# Patient Record
Sex: FEMALE | Race: WHITE | NOT HISPANIC OR LATINO | Employment: FULL TIME | URBAN - METROPOLITAN AREA
[De-identification: names, ages, dates, MRNs, and addresses within clinical notes are randomized per-mention and may not be internally consistent; named-entity substitution may affect disease eponyms.]

---

## 2017-01-05 ENCOUNTER — ALLSCRIPTS OFFICE VISIT (OUTPATIENT)
Dept: OTHER | Facility: OTHER | Age: 47
End: 2017-01-05

## 2017-01-16 ENCOUNTER — ALLSCRIPTS OFFICE VISIT (OUTPATIENT)
Dept: OTHER | Facility: OTHER | Age: 47
End: 2017-01-16

## 2017-01-23 ENCOUNTER — ALLSCRIPTS OFFICE VISIT (OUTPATIENT)
Dept: OTHER | Facility: OTHER | Age: 47
End: 2017-01-23

## 2017-02-03 ENCOUNTER — ALLSCRIPTS OFFICE VISIT (OUTPATIENT)
Dept: OTHER | Facility: OTHER | Age: 47
End: 2017-02-03

## 2017-02-04 ENCOUNTER — HOSPITAL ENCOUNTER (EMERGENCY)
Facility: HOSPITAL | Age: 47
Discharge: HOME | End: 2017-02-04
Attending: EMERGENCY MEDICINE | Admitting: EMERGENCY MEDICINE
Payer: OTHER MISCELLANEOUS

## 2017-02-04 VITALS
DIASTOLIC BLOOD PRESSURE: 63 MMHG | OXYGEN SATURATION: 99 % | SYSTOLIC BLOOD PRESSURE: 109 MMHG | TEMPERATURE: 97.8 F | HEART RATE: 86 BPM | RESPIRATION RATE: 16 BRPM | WEIGHT: 200 LBS
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
## 2017-02-04 LAB — ALT SERPL W P-5'-P-CCNC: 19 U/L (ref 12–78)

## 2017-02-04 PROCEDURE — 87389 HIV-1 AG W/HIV-1&-2 AB AG IA: CPT | Performed by: EMERGENCY MEDICINE

## 2017-02-04 PROCEDURE — 87340 HEPATITIS B SURFACE AG IA: CPT | Performed by: EMERGENCY MEDICINE

## 2017-02-04 PROCEDURE — 99283 EMERGENCY DEPT VISIT LOW MDM: CPT

## 2017-02-04 PROCEDURE — 86706 HEP B SURFACE ANTIBODY: CPT | Performed by: EMERGENCY MEDICINE

## 2017-02-04 PROCEDURE — 84460 ALANINE AMINO (ALT) (SGPT): CPT | Performed by: EMERGENCY MEDICINE

## 2017-02-04 PROCEDURE — 36415 COLL VENOUS BLD VENIPUNCTURE: CPT | Performed by: EMERGENCY MEDICINE

## 2017-02-04 PROCEDURE — 86803 HEPATITIS C AB TEST: CPT | Performed by: EMERGENCY MEDICINE

## 2017-02-04 RX ORDER — MULTIVITAMIN
1 TABLET ORAL AS NEEDED
COMMUNITY

## 2017-02-04 RX ORDER — LORAZEPAM 0.5 MG/1
TABLET ORAL AS NEEDED
COMMUNITY
Start: 2015-09-10 | End: 2018-08-02

## 2017-02-05 LAB
HBV SURFACE AB SER-ACNC: 78.52 MIU/ML
HBV SURFACE AG SER QL: NORMAL
HCV AB SER QL: NORMAL

## 2017-02-06 LAB — HIV 1+2 AB+HIV1 P24 AG SERPL QL IA: NORMAL

## 2017-02-28 ENCOUNTER — ALLSCRIPTS OFFICE VISIT (OUTPATIENT)
Dept: OTHER | Facility: OTHER | Age: 47
End: 2017-02-28

## 2017-07-19 ENCOUNTER — APPOINTMENT (OUTPATIENT)
Dept: LAB | Facility: HOSPITAL | Age: 47
End: 2017-07-19
Payer: COMMERCIAL

## 2017-07-19 ENCOUNTER — TRANSCRIBE ORDERS (OUTPATIENT)
Dept: ADMINISTRATIVE | Facility: HOSPITAL | Age: 47
End: 2017-07-19

## 2017-07-19 DIAGNOSIS — Z00.8 HEALTH EXAMINATION IN POPULATION SURVEY: Primary | ICD-10-CM

## 2017-07-19 DIAGNOSIS — Z00.8 HEALTH EXAMINATION IN POPULATION SURVEY: ICD-10-CM

## 2017-07-19 LAB
CHOLEST SERPL-MCNC: 133 MG/DL (ref 50–200)
EST. AVERAGE GLUCOSE BLD GHB EST-MCNC: 114 MG/DL
HBA1C MFR BLD: 5.6 % (ref 4.2–6.3)
HDLC SERPL-MCNC: 52 MG/DL (ref 40–60)
LDLC SERPL CALC-MCNC: 63 MG/DL (ref 0–100)
TRIGL SERPL-MCNC: 91 MG/DL

## 2017-07-19 PROCEDURE — 83036 HEMOGLOBIN GLYCOSYLATED A1C: CPT

## 2017-07-19 PROCEDURE — 36415 COLL VENOUS BLD VENIPUNCTURE: CPT

## 2017-07-19 PROCEDURE — 80061 LIPID PANEL: CPT

## 2017-07-20 ENCOUNTER — TRANSCRIBE ORDERS (OUTPATIENT)
Dept: ADMINISTRATIVE | Facility: HOSPITAL | Age: 47
End: 2017-07-20

## 2017-07-20 DIAGNOSIS — Z12.31 ENCOUNTER FOR SCREENING MAMMOGRAM FOR MALIGNANT NEOPLASM OF BREAST: Primary | ICD-10-CM

## 2017-07-21 DIAGNOSIS — E66.8 OTHER OBESITY: ICD-10-CM

## 2017-07-21 DIAGNOSIS — F33.9 RECURRENT MAJOR DEPRESSIVE DISORDER (HCC): ICD-10-CM

## 2017-07-21 DIAGNOSIS — Z12.31 ENCOUNTER FOR SCREENING MAMMOGRAM FOR MALIGNANT NEOPLASM OF BREAST: ICD-10-CM

## 2017-07-21 DIAGNOSIS — I83.90 ASYMPTOMATIC VARICOSE VEINS OF LOWER EXTREMITY: ICD-10-CM

## 2017-07-21 DIAGNOSIS — L30.9 DERMATITIS: ICD-10-CM

## 2017-07-21 DIAGNOSIS — Z00.00 ENCOUNTER FOR GENERAL ADULT MEDICAL EXAMINATION WITHOUT ABNORMAL FINDINGS: ICD-10-CM

## 2017-07-21 DIAGNOSIS — R53.83 OTHER FATIGUE: ICD-10-CM

## 2017-07-26 ENCOUNTER — ALLSCRIPTS OFFICE VISIT (OUTPATIENT)
Dept: OTHER | Facility: OTHER | Age: 47
End: 2017-07-26

## 2017-07-28 ENCOUNTER — ALLSCRIPTS OFFICE VISIT (OUTPATIENT)
Dept: OTHER | Facility: OTHER | Age: 47
End: 2017-07-28

## 2017-07-28 ENCOUNTER — TRANSCRIBE ORDERS (OUTPATIENT)
Dept: LAB | Facility: CLINIC | Age: 47
End: 2017-07-28

## 2017-07-28 ENCOUNTER — APPOINTMENT (OUTPATIENT)
Dept: LAB | Facility: CLINIC | Age: 47
End: 2017-07-28
Payer: COMMERCIAL

## 2017-07-28 DIAGNOSIS — I83.90 ASYMPTOMATIC VARICOSE VEINS OF LOWER EXTREMITY: ICD-10-CM

## 2017-07-28 DIAGNOSIS — E66.8 OTHER OBESITY: ICD-10-CM

## 2017-07-28 DIAGNOSIS — Z00.00 ENCOUNTER FOR GENERAL ADULT MEDICAL EXAMINATION WITHOUT ABNORMAL FINDINGS: ICD-10-CM

## 2017-07-28 DIAGNOSIS — L30.9 DERMATITIS: ICD-10-CM

## 2017-07-28 DIAGNOSIS — R53.83 OTHER FATIGUE: ICD-10-CM

## 2017-07-28 DIAGNOSIS — F33.9 RECURRENT MAJOR DEPRESSIVE DISORDER (HCC): ICD-10-CM

## 2017-07-28 LAB
ALBUMIN SERPL BCP-MCNC: 3.7 G/DL (ref 3.5–5)
ALP SERPL-CCNC: 72 U/L (ref 46–116)
ALT SERPL W P-5'-P-CCNC: 16 U/L (ref 12–78)
ANION GAP SERPL CALCULATED.3IONS-SCNC: 9 MMOL/L (ref 4–13)
AST SERPL W P-5'-P-CCNC: 10 U/L (ref 5–45)
BASOPHILS # BLD AUTO: 0.01 THOUSANDS/ΜL (ref 0–0.1)
BASOPHILS NFR BLD AUTO: 0 % (ref 0–1)
BILIRUB SERPL-MCNC: 0.41 MG/DL (ref 0.2–1)
BUN SERPL-MCNC: 10 MG/DL (ref 5–25)
CALCIUM SERPL-MCNC: 8.9 MG/DL (ref 8.3–10.1)
CHLORIDE SERPL-SCNC: 103 MMOL/L (ref 100–108)
CO2 SERPL-SCNC: 26 MMOL/L (ref 21–32)
CREAT SERPL-MCNC: 0.73 MG/DL (ref 0.6–1.3)
EOSINOPHIL # BLD AUTO: 0.04 THOUSAND/ΜL (ref 0–0.61)
EOSINOPHIL NFR BLD AUTO: 1 % (ref 0–6)
ERYTHROCYTE [DISTWIDTH] IN BLOOD BY AUTOMATED COUNT: 12.9 % (ref 11.6–15.1)
GFR SERPL CREATININE-BSD FRML MDRD: 98 ML/MIN/1.73SQ M
GLUCOSE P FAST SERPL-MCNC: 84 MG/DL (ref 65–99)
HCT VFR BLD AUTO: 38.5 % (ref 34.8–46.1)
HGB BLD-MCNC: 13.3 G/DL (ref 11.5–15.4)
LYMPHOCYTES # BLD AUTO: 1.66 THOUSANDS/ΜL (ref 0.6–4.47)
LYMPHOCYTES NFR BLD AUTO: 28 % (ref 14–44)
MCH RBC QN AUTO: 29.7 PG (ref 26.8–34.3)
MCHC RBC AUTO-ENTMCNC: 34.5 G/DL (ref 31.4–37.4)
MCV RBC AUTO: 86 FL (ref 82–98)
MONOCYTES # BLD AUTO: 0.4 THOUSAND/ΜL (ref 0.17–1.22)
MONOCYTES NFR BLD AUTO: 7 % (ref 4–12)
NEUTROPHILS # BLD AUTO: 3.93 THOUSANDS/ΜL (ref 1.85–7.62)
NEUTS SEG NFR BLD AUTO: 64 % (ref 43–75)
NRBC BLD AUTO-RTO: 0 /100 WBCS
PLATELET # BLD AUTO: 208 THOUSANDS/UL (ref 149–390)
PMV BLD AUTO: 10.5 FL (ref 8.9–12.7)
POTASSIUM SERPL-SCNC: 4.2 MMOL/L (ref 3.5–5.3)
PROT SERPL-MCNC: 7.3 G/DL (ref 6.4–8.2)
RBC # BLD AUTO: 4.48 MILLION/UL (ref 3.81–5.12)
SODIUM SERPL-SCNC: 138 MMOL/L (ref 136–145)
TSH SERPL DL<=0.05 MIU/L-ACNC: 3.28 UIU/ML (ref 0.36–3.74)
WBC # BLD AUTO: 6.04 THOUSAND/UL (ref 4.31–10.16)

## 2017-07-28 PROCEDURE — 85025 COMPLETE CBC W/AUTO DIFF WBC: CPT

## 2017-07-28 PROCEDURE — 80053 COMPREHEN METABOLIC PANEL: CPT

## 2017-07-28 PROCEDURE — 36415 COLL VENOUS BLD VENIPUNCTURE: CPT

## 2017-07-28 PROCEDURE — 86617 LYME DISEASE ANTIBODY: CPT

## 2017-07-28 PROCEDURE — 84443 ASSAY THYROID STIM HORMONE: CPT

## 2017-07-29 LAB

## 2017-07-31 ENCOUNTER — GENERIC CONVERSION - ENCOUNTER (OUTPATIENT)
Dept: OTHER | Facility: OTHER | Age: 47
End: 2017-07-31

## 2017-08-16 ENCOUNTER — ALLSCRIPTS OFFICE VISIT (OUTPATIENT)
Dept: OTHER | Facility: OTHER | Age: 47
End: 2017-08-16

## 2017-08-16 ENCOUNTER — HOSPITAL ENCOUNTER (OUTPATIENT)
Dept: MAMMOGRAPHY | Facility: MEDICAL CENTER | Age: 47
Discharge: HOME | End: 2017-08-16
Payer: COMMERCIAL

## 2017-08-16 DIAGNOSIS — Z12.31 ENCOUNTER FOR SCREENING MAMMOGRAM FOR MALIGNANT NEOPLASM OF BREAST: ICD-10-CM

## 2017-08-16 PROCEDURE — 77063 BREAST TOMOSYNTHESIS BI: CPT

## 2017-08-16 PROCEDURE — G0202 SCR MAMMO BI INCL CAD: HCPCS

## 2017-10-09 ENCOUNTER — ALLSCRIPTS OFFICE VISIT (OUTPATIENT)
Dept: OTHER | Facility: OTHER | Age: 47
End: 2017-10-09

## 2017-11-07 ENCOUNTER — ALLSCRIPTS OFFICE VISIT (OUTPATIENT)
Dept: OTHER | Facility: OTHER | Age: 47
End: 2017-11-07

## 2018-01-10 NOTE — PROGRESS NOTES
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 to the point where she cannot realistically implement a set routine  This struggle was validated  When asked about tracking calories and measuring out her portion sizes she admitted that she does not implement those behaviors  She denies emotional and stress eating at this time  She states if she has cravings it is usually for sweets  She does not have desserts in her house currently and she reports if she does have candy at work she can limit herself to 1 or 2 pieces  She does not feel out of control with this behavior at this time  It appears that her biggest behavioral struggle is time management and planning ahead  Although, she does state that she tries to get 1 protein shake in each day, she appears to be struggling with getting the recommended 2 shakes per day  She reports getting enough water throughout the day and even though her bedtime changes based on her shift she continues to get about 7 hours a night consistently  She feels she is mindful of portion control, but knows she needs to get stricter with this  She states her goals are "baby steps"  She would like to focus on reducing her portion sizes  Counselor encouraged her to start becoming aware of possible emotional triggers causing her to crave sweets  These goals will be addressed during her next appointment  Active Problems    1  Adult BMI > 30 (V85 30) (E66 8)   2  Depression, major, recurrent (296 30) (F33 9)   3  Eczema (692 9) (L30 9)   4  Enlarged uterus (621 2) (N85 2)   5  History of allergy (V15 09) (Z88 9)   6  Irregular menses (626 4) (N92 6)   7  Spider vein, symptomatic (448 1) (I78 1)   8  Vertigo (780 4) (R42)   9   Weight gain (783  1) (R63 5)    Past Medical History    1  History of Acute upper respiratory infection (465 9) (J06 9)   2  History of Blood tests for routine general physical examination (V72 62) (Z00 00)   3  History of Encounter for gynecological examination with Papanicolaou smear of cervix   (V72 31,V76 2) (Z01 419,Z12 4)   4  History of Encounter for screening mammogram for malignant neoplasm of breast   (V76 12) (Z12 31)   5  History of acute sinusitis (V12 69) (Z87 09)   6  History of influenza (V12 09) (Z87 09)    Surgical History    1  History of Oral Surgery    Family History  Mother    1  Family history of Hypertension (V17 49)  Father    2  Family history of Cirrhosis   3  Family history of Leukemia (V16 6)  Maternal Grandmother    4  Family history of Diabetes Mellitus (V18 0)  Paternal Grandmother    11  Family history of Anxiety and depression  Paternal Grandfather    6  Family history of Acute Myocardial Infarction (V17 3)    Social History    · Being A Social Drinker   · College student   · Daily Coffee Consumption (3  Cups/Day)   · Employed   · Household: Mother   ·    · Never A Smoker    Current Meds   1  Belviq XR 20 MG Oral Tablet Extended Release 24 Hour; Take 1 tablet daily; Therapy: 37PUF8853 to (Evaluate:16Apr2017); Last Rx:16Jan2017 Ordered    Allergies    1  No Known Drug Allergies    Vitals  Signs   Recorded: 68HMR1433 01:21PM   Height: 5 ft 2 5 in  Weight: 200 lb 12 8 oz  BMI Calculated: 36 14  BSA Calculated: 1 93    Future Appointments    Date/Time Provider Specialty Site   02/15/2017 01:00 PM Terence Fisher [facility]   04/20/2017 10:00 AM [facility] Joseph   [facility]     Signatures   Electronically signed by : Kvng Kaplan, ; Feb  3 2017  1:49PM EST                       (Author)    Electronically signed by : CLEO Cole ; Feb 6 2017  9:10AM EST                       (Co-author)

## 2018-01-11 NOTE — RESULT NOTES
Verified Results  (1) THIN PREP PAP WITH IMAGING [id number] 12:00AM Estephania Bernal     Test Name Result Flag Reference   LAB AP CASE REPORT (Report)     Gynecologic Cytology Report            Case: [id number]                  Authorizing Provider: Gini Gil MD Collected:      03/14/2016           First Screen:     Claudean Gaunt, CT    Received:      03/16/2016 0944        Specimen:  LIQUID-BASED PAP, SCREENING, Endocervical   HPV HIGH RISK RESULT (Report)     HPV, High Risk: HPV NEG, HPV16 NEG, HPV18 NEG      Other High Risk HPV Negative, HPV 16 Negative, HPV 18 Negative  HPV types: 16,18,31,33,35,39,45,51,52,56,58,59,66 and 68 DNA are undetectable or below the pre-set threshold  [employer]'s FDA approved Tania 4800 is utilized with strict adherence to the 's instruction  manual to test for the presence of High-Risk HPV DNA, as well as HPV 16 and HPV 18  This instrument  has been validated by our laboratory and/or by the   A negative result does not preclude the presence of HPV infection because results depend on adequate  specimen collection, absence of inhibitors and sufficient DNA to be detected  Additionally, HPV negative  results are not intended to prevent women from proceeding to colposcopy if clinically warranted  Positive HPV test results indicate the presence of any one or more of the high risk types, but since patients  are often co-infected with low-risk types it does not rule out the presence of low-risk types in patients  with mixed infections  LAB AP GYN PRIMARY INTERPRETATION      Negative for intraepithelial lesion or malignancy   LAB AP GYN SPECIMEN ADEQUACY      Satisfactory for evaluation  Endocervical/transformation zone component present     LAB AP GYN ADDITIONAL INFORMATION (Report)     [email or web address]'s FDA approved ,  and ThinPrep Imaging System are   utilized with strict adherence to the 's instruction manual to   prepare gynecologic and non-gynecologic cytology specimens for the   production of ThinPrep slides as well as for gynecologic ThinPrep imaging  These processes have been validated by our laboratory and/or by the     The Pap test is not a diagnostic procedure and should not be used as the   sole means to detect cervical cancer  It is only a screening procedure to   aid in the detection of cervical cancer and its precursors  Both   false-negative and false-positive results have been experienced  Your   patient's test result should be interpreted in this context together with   the history and clinical findings

## 2018-01-11 NOTE — PROGRESS NOTES
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  Following meal plan and enjoying the products  Plans to continue with current regimen at this time  Bariatric MNT [facility]:   Weight Assessment: IBW: 110, ABW: 133 7 lbs, Weight Change: 5 1 lbs x 2 wks, Goal Weight: ,   Weight loss attempts: Weight Watchers: 20 lbs lbs  Excess calories come from in between meal snacking, large portions at mealtimes and high calorie high fat food choices  Dietary Recall:   Breakfast: shake  Snack: 150 calorie  Lunch: shake (start of shift)  Snack: midnight: chicken/salad/vinagrette/veggies  Dinner: 150 calories  Snack: 150 calories   Beverages: water  Estimated fluid intake: >64  Exercise Frequency:  She does not exercise  Her obesity/being overweight is related to excess energy intake and as evidenced by BMI=37 5  Nutrition Prescription: Estimated calories for weight loss: eCal BMR 1523, wt loss 828-1328, Estimated daily protein needs: 60-75 gm (1 2-1 5 gm/kg IBW) and Estimated daily fluid needs: 58 0z (35 cc/kg IBW)  Nutrition Intervention: Counseling provided with emphasis on meal planning, portion sizes, healthy snack choices and food journaling  Education materials provided: [employer] manual    Comprehension: good  Expected Compliance: good  Goals: follow meal plan prescribed, reduce portion sizes at mealtimes, plan meals and snacks daily, food journal and do not skip meals or snacks  Monitor/Evaluation: weekly weight, food journal, fluid intake and exercise level  Active Problems    1  Acute sinusitis (461 9) (J01 90)   2  Acute upper respiratory infection (465 9) (J06 9)   3  Adult BMI > 30 (V85 30) (E66 8)   4   Blood tests for routine general physical examination (V72 62) (Z00 00)   5  Depression, major, recurrent (296 30) (F33 9)   6  Eczema (692 9) (L30 9)   7  Encounter for gynecological examination with Papanicolaou smear of cervix   (V72 31,V76 2) (Z01 419,Z12 4)   8  Encounter for screening mammogram for malignant neoplasm of breast (V76 12)   (Z12 31)   9  Enlarged uterus (621 2) (N85 2)   10  History of allergy (V15 09) (Z88 9)   11  Influenza (487 1) (J11 1)   12  Irregular menses (626 4) (N92 6)   13  Spider vein, symptomatic (448 1) (I78 1)   14  Vertigo (780 4) (R42)   15  Weight gain (783 1) (R63 5)    Surgical History    1  History of Oral Surgery    Family History  Mother    1  Family history of Hypertension (V17 49)  Father    2  Family history of Cirrhosis   3  Family history of Leukemia (V16 6)  Maternal Grandmother    4  Family history of Diabetes Mellitus (V18 0)  Paternal Grandmother    11  Family history of Anxiety and depression  Paternal Grandfather    6  Family history of Acute Myocardial Infarction (V17 3)    Social History    · Being A Social Drinker   · College student   · Daily Coffee Consumption (3  Cups/Day)   · Employed   · Household: Mother   ·    · Never A Smoker    Current Meds   1  Multivitamins TABS; Therapy: (Recorded:14Mar2016) to Recorded   2  Tylenol TABS; Therapy: (Recorded:14Mar2016) to Recorded    Allergies    1  No Known Drug Allergies    Vitals  Signs   Recorded: 31Yze9485 04:46PM   Height: 5 ft 2 in  Weight: 204 lb 14 oz  BMI Calculated: 37 47  BSA Calculated: 1 93    Future Appointments    Date/Time Provider Specialty Site   10/10/2016 02:45 PM CLEO Babb  Bariatric Medicine [facility]     Signatures   Electronically signed by :  Ashley Rutledge, ; Aug  1 2016  4:44PM EST                       (Author)    Electronically signed by : CLEO Cruz ; Aug  2 2016  8:21AM EST                       (Co-author)

## 2018-01-12 NOTE — CONSULTS
Chief Complaint  Chief Complaint Free Text Note Form: New Pt, here for MWM consult  Stop Band total 1/8  Pt, stated no complaints  History of Present Illness  Free Text HPI: Obesity-  Severity: Moderate  Onset: Most of her life  Modifiers: Is a weight watchers member for life, tried the whole 30 diet and lost weight  Gained weight through nursing school and following back to old habits  Associated Symptoms: Feels uncomfortable  Past Medical History  Active Problems And Past Medical History Reviewed: The active problems and past medical history were reviewed and updated today  Assessment    1  Weight gain (783 1) (R63 5)   2  Adult BMI > 30 (V85 30) (E66 8)    Discussion/Summary  Discussion Summary:   54 yo female w/ obesity here to pursue medical weight management to improve weight and health  Obesity class 2:  -discussed options of conservative vs MADISYN program +/- meal replacement  -initial focus of 5-10% weight loss over 3-6 mos for improved health  -screening labs-recently completed and within acceptable range    RTC for new start visit and 12 week follow up with me     Review of Systems  Focused-Female:   Constitutional: no fever  ENT: no sore throat  Cardiovascular: no chest pain  Respiratory: no shortness of breath  Gastrointestinal: no abdominal pain  Genitourinary: no dysuria  Musculoskeletal: arthralgias  Integumentary: rash  Neurological: headache  Other Symptoms: Psych: Denies depression/anxiety  Active Problems    1  Acute sinusitis (461 9) (J01 90)   2  Acute upper respiratory infection (465 9) (J06 9)   3  Blood tests for routine general physical examination (V72 62) (Z00 00)   4  Depression, major, recurrent (296 30) (F33 9)   5  Eczema (692 9) (L30 9)   6  Encounter for gynecological examination with Papanicolaou smear of cervix   (V72 31,V76 2) (Z01 419,Z12 4)   7  Encounter for screening mammogram for malignant neoplasm of breast (V76 12)   (Z12 31)   8  Enlarged uterus (621 2) (N85 2)   9  History of allergy (V15 09) (Z88 9)   10  Influenza (487 1) (J11 1)   11  Irregular menses (626 4) (N92 6)   12  Spider vein, symptomatic (448 1) (I78 1)   13  Vertigo (780 4) (R42)    Surgical History    1  History of Oral Surgery  Surgical History Reviewed: The surgical history was reviewed and updated today  Family History  Mother    1  Family history of Hypertension (V17 49)  Father    2  Family history of Cirrhosis   3  Family history of Leukemia (V16 6)  Maternal Grandmother    4  Family history of Diabetes Mellitus (V18 0)  Paternal Grandmother    11  Family history of Anxiety and depression  Paternal Grandfather    6  Family history of Acute Myocardial Infarction (V17 3)  Family History Reviewed: The family history was reviewed and updated today  Social History    · Being A Social Drinker   · College student   · Daily Coffee Consumption (3  Cups/Day)   · Employed   · Household: Mother   ·    · Never A Smoker  Social History Reviewed: The social history was reviewed and updated today  Current Meds   1  Multivitamins TABS; Therapy: (Recorded:14Mar2016) to Recorded   2  Tylenol TABS; Therapy: (Recorded:14Mar2016) to Recorded  Medication List Reviewed: The medication list was reviewed and updated today  Allergies    1  No Known Drug Allergies    Vitals  Vital Signs [Data Includes: Current Encounter]    Recorded: 77LET1061 08:20AM   Temperature 98 4 F    Heart Rate 84    Respiration 16    Systolic 243    Diastolic 74    Height 5 ft 2 in    Weight 205 lb 0 96 oz    BMI Calculated 37 51    BSA Calculated 1 93    O2 Saturation 97    Waist Circumference  45   Neck Circumference  14 5     Physical Exam    Constitutional   General appearance: No acute distress, well appearing and well nourished  well developed and obese  Eyes No conjunctival pallor  Ears, Nose, Mouth, and Throat Moist oral mucosa     Pulmonary   Respiratory effort: No increased work of breathing or signs of respiratory distress  Auscultation of lungs: Clear to auscultation  Cardiovascular   Auscultation of heart: Normal rate and rhythm, normal S1 and S2, without murmurs  Trace edema right greater than left  Abdomen   Abdomen: Non-tender, no masses  The abdomen was obese  The abdomen was soft and nontender     Musculoskeletal   Gait and station: Normal     Psychiatric   Orientation to person, place, and time: Normal     Mood and affect: Normal          Results/Data  Encounter Results   STOP BANG Questionnaire 27MHA8401 08:49AM User, Ahs     Test Name Result Flag Reference   STOP BANG Questionnaire Risk of JIGAR Low Risk     Snoring: No  Tired: Yes  Observed: No  Blood Pressure: No  BMI: No  Age: No  Neck Circumference: No  Gender: No   STOP BANG Questionnaire JIGAR Total Score 1     Snoring: No  Tired: Yes  Observed: No  Blood Pressure: No  BMI: No  Age: No  Neck Circumference: No  Gender: No       Future Appointments    Date/Time Provider Specialty Site   07/18/2016 01:00 PM Jerry Fisher 81     Signatures   Electronically signed by : CLEO Cole ; Jun 29 2016  9:28AM EST                       (Author)    Electronically signed by : CLEO Cole ; Jun 29 2016 12:22PM EST                       (Author)

## 2018-01-12 NOTE — RESULT NOTES
Verified Results  * [facility] [id number] 03:45PM Howard Rodriguez Order Number: [id number]     Test Name Result Flag Reference   US PELVIS COMPLETE (TRANSABDOMINAL AND TRANSVAGINAL) (Report)     PELVIC ULTRASOUND, COMPLETE     INDICATION: Evaluate for enlarged uterus  COMPARISON: None  TECHNIQUE:  Transabdominal pelvic ultrasound was performed in sagittal and transverse planes with a curvilinear transducer  Additional transvaginal imaging was performed to better evaluate the endometrium and ovaries  Imaging included volumetric    sweeps as well as traditional still imaging technique  FINDINGS:     UTERUS:   The uterus is anteverted in position, measuring 10 8 x 4 7 x 6 8 cm  Contour and echotexture appear normal      The cervix shows no suspicious abnormality  ENDOMETRIUM:    Normal caliber of 10 mm  Homogenous and normal in appearance  OVARIES/ADNEXA:   Right ovary: 3 4 x 1 6 x 1 6 cm  No suspicious right ovarian abnormality  Doppler flow within normal limits  Left ovary: 2 2 x 2 8 x 2 5 cm  No suspicious left ovarian abnormality  Doppler flow within normal limits  No suspicious adnexal mass or loculated collections  There is no free fluid  IMPRESSION:      Normal pelvic ultrasound            Workstation performed: [id number]     Signed by:   Shanon Sanabria MD   4/18/16

## 2018-01-13 VITALS
HEIGHT: 62 IN | BODY MASS INDEX: 38.09 KG/M2 | DIASTOLIC BLOOD PRESSURE: 80 MMHG | HEART RATE: 60 BPM | WEIGHT: 207 LBS | RESPIRATION RATE: 16 BRPM | SYSTOLIC BLOOD PRESSURE: 106 MMHG

## 2018-01-13 VITALS
DIASTOLIC BLOOD PRESSURE: 82 MMHG | SYSTOLIC BLOOD PRESSURE: 112 MMHG | HEIGHT: 62 IN | BODY MASS INDEX: 37.73 KG/M2 | WEIGHT: 205 LBS

## 2018-01-13 NOTE — MISCELLANEOUS
Provider Comments  Provider Comments:   Dear Rory Matamoros,     We called you about your scheduled appointment for today but were unable to reach you  It is very important that you follow up with us so that we can assess your physical and nutritional safety  Please call our office at [phone] to reschedule your appointment       Sincerely,     [facility]        Signatures   Electronically signed by : Marianna Espinosa, ; Mar 23 2016  1:54PM EST                       (Author)    Electronically signed by : CLEO Dsyon ; Mar 23 2016  2:04PM EST                       (Co-author)

## 2018-01-13 NOTE — PROGRESS NOTES
Assessment    1  Encounter for preventive health examination (V70 0) (Z00 00)   2  Severe obesity (BMI 35 0-35 9 with comorbidity) (278 01,V85 35) (E66 01,Z68 35)   3  Depression, major, recurrent (296 30) (F33 9)   4  Varicose veins (454 9) (I83 90)   5  Need for tetanus booster (V03 7) (Z23)    Plan  Need for tetanus booster    · Adacel 5-2-15 5 LF-MCG/0 5 Intramuscular Suspension    Discussion/Summary  health maintenance visit Currently, she eats an adequate diet, has an inadequate exercise regimen and encouraged to increase her exercise to 150 minutes a week  cervical cancer screening is current Breast cancer screening: mammogram is current  Colorectal cancer screening: colorectal cancer screening is not indicated  Osteoporosis screening: bone mineral density testing is not indicated  Screening lab work includes labs results reviewed  The immunizations will be given as outlined in the orders  She was advised to be evaluated by will call if decides that she wants to see audiologist  Advice and education were given regarding nutrition, aerobic exercise and weight loss  Patient discussion: discussed with the patient  1  Obesity - the patient will continue on Contrave 1 tablet daily for now  She will be following up with Dr Helena Conde at the end of the month  She was encouraged to continue to follow her 1200 calorie diet and to work on increasing her exercise  2  Depression -the patient is doing well with her current bupropion that she is getting through her Contrave prescription  We will monitor her symptoms for now  She was encouraged to call for follow-up and discussion of medication if her weight loss medication is changed at some point in the future and she feels like she needs something else for depression  3  Varicose vein -the patient still has trace edema noted in her legs  She was encouraged to limit her salt intake and to get her prescription for the compression stocking filled     Possible side effects of new medications were reviewed with the patient/guardian today  The treatment plan was reviewed with the patient/guardian  The patient/guardian understands and agrees with the treatment plan      Chief Complaint  Physical      History of Present Illness  HM, Adult Female: The patient is being seen for a health maintenance evaluation  General Health: The patient's health since the last visit is described as fair   wants to lose weight  She has regular dental visits  She complains of vision problems  Vision care includes wearing glasses, wearing soft contact lenses and an eye examination within the last year  She denies hearing loss  Immunizations status: not up to date The patient needs the following immunization(s): tetanus vaccine  Lifestyle:  She consumes a diverse and healthy diet  She does not exercise regularly  She does not use tobacco  She consumes alcohol  (1 x a month)   She denies drug use  Reproductive health:  she reports abnormal menses  Menstrual history: The cycles are irregular  The duration of her recent periods usually last 5-7 days  Screening: Cervical cancer screening includes a pap smear performed 3/2016  Breast cancer screening includes a mammogram performed 8/2017  Colorectal cancer screening includes no previous colonoscopy  Metabolic screening includes lipid profile performed 7/2017, glucose screening performed 7/2017 and thyroid function test performed 7/2017  Safety elements used: seat belt, sunscreen and smoke detector, but no carbon monoxide detector  Risk findings: no domestic violence     HPI: notes that she is doing better with her mood since on Contrave - was having trouble with clenching teeth a lot all day - better since down to 1 tablet daily (was up to 3-4 tabs) - had mild nausea which comes and goes still - has been sticking to 1200 calories a day - has gone to the gym about     notes that 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 - no compression stockings yet    notes still fatigued at times - just depends on if her menses is coming and then if her sleep is adequate    She notes that her hand dermatitis has resolved since her last visit  She used triamcinolone cream for about a week and has been good since then  Review of Systems    Constitutional: no fever and no chills  Cardiovascular: no chest pain and no palpitations  Respiratory: no shortness of breath, no cough and no wheezing  Gastrointestinal: nausea, but no vomiting and no diarrhea  Genitourinary: no dysuria  The patient presents with complaints of arthralgias (hips and back)  Integumentary: no rashes  Neurological: no headache, no dizziness and no fainting  Psychiatric: depression, but as noted in HPI  Hematologic/Lymphatic: no tendency for easy bleeding and no tendency for easy bruising  Active Problems    1  Adult BMI > 30 (V85 30)   2  Depression, major, recurrent (296 30) (F33 9)   3  Eczema (692 9) (L30 9)   4  Encounter for gynecological examination (V72 31) (Z01 419)   5  Encounter for screening mammogram for malignant neoplasm of breast (V76 12)   (Z12 31)   6  Enlarged uterus (621 2) (N85 2)   7  Fatigue (780 79) (R53 83)   8  Hand dermatitis (692 9) (L30 9)   9  History of allergy (V15 09) (Z88 9)   10  Irregular menses (626 4) (N92 6)   11  Severe obesity (BMI 35 0-35 9 with comorbidity) (278 01,V85 35) (E66 01,Z68 35)   12  Spider vein, symptomatic (448 1) (I78 1)   13  Varicose veins (454 9) (I83 90)   14  Vertigo (780 4) (R42)   15   Weight gain (783 1) (R63 5)    Past Medical History    · History of Acute upper respiratory infection (465 9) (J06 9)   · History of Blood tests for routine general physical examination (V72 62) (Z00 00)   · History of Encounter for gynecological examination with Papanicolaou smear of cervix  (V72 31) (Z01 419)   · History of acute sinusitis (V12 69) (Z87 09)   · History of influenza (V12 09) (Z87 09)   · History of Yeast infection (112 9) (B37 9)    Surgical History    · History of Oral Surgery   · History of Root Canal With Apicoectomy    Family History  Mother    · Family history of hypertension (V17 49) (Z82 49)   · Family history of rheumatoid arthritis (V17 7) (Z82 61)  Father    · Family history of Cirrhosis   · Family history of leukemia (V16 6) (Z80 6)  Maternal Grandmother    · Family history of diabetes mellitus (V18 0) (Z83 3)   · Family history of hypertension (V17 49) (Z82 49)   · Family history of pulmonary hypertension (V17 49) (Z82 49)  Paternal Grandmother    · Family history of Anxiety and depression   · Family history of dementia (V17 2) (Z81 8)   · Family history of glaucoma (V19 11) (Z83 511)   · Family history of hypertension (V17 49) (Z82 49)  Maternal Grandfather    · Family history of malignant neoplasm (V16 9) (Z80 9)  Paternal Grandfather    · Family history of lung cancer (V16 1) (Z80 1)   · Family history of myocardial infarction (V17 3) (Z80 55)    Social History    · Being A Social Drinker   · College student   · Daily Coffee Consumption (2  Cups/Day)   · Employed   · Household: Mother   ·    · Never a smoker    Current Meds   1  Contrave 8-90 MG Oral Tablet Extended Release 12 Hour; TAKE TWO TABLETS TWICE   A DAY, AS DIRECTED; Therapy: 68TCS1731 to (Last Rx:[id number])  Requested for: 66Pcn2495 Ordered   2  Multi-Vitamin TABS; Therapy: (Recorded:95Nqh3740) to Recorded    Allergies    1  No Known Drug Allergies    Vitals   Recorded: 32QIJ1137 08:24AM   Heart Rate 60   Respiration 16   Systolic 105   Diastolic 80   Height 5 ft 2 in   Weight 207 lb    BMI Calculated 37 86   BSA Calculated 1 94     Physical Exam    Constitutional   General appearance: No acute distress, well appearing and well nourished  obese  Head and Face   Head and face: Normal     Eyes   Conjunctiva and lids: No swelling, erythema or discharge  Pupils and irises: Equal, round, reactive to light      Ears, Nose, Mouth, and Throat   External inspection of ears and nose: Normal     Otoscopic examination: Tympanic membranes translucent with normal light reflex  Canals patent without erythema  Hearing: Normal     Nasal mucosa, septum, and turbinates: Normal without edema or erythema  Lips, teeth, and gums: Normal, good dentition  Oropharynx: Normal with no erythema, edema, exudate or lesions  Neck   Neck: Supple, symmetric, trachea midline, no masses  Thyroid: Normal, no thyromegaly  Pulmonary   Respiratory effort: No increased work of breathing or signs of respiratory distress  Auscultation of lungs: Clear to auscultation  Cardiovascular   Auscultation of heart: Normal rate and rhythm, normal S1 and S2, no murmurs  Peripheral vascular exam: Normal   Carotid: no bruit on the right and no bruit on the left  Radial: right 2+ and left 2+  Posterior tibialis: right 2+ and left 2+  Examination of extremities for edema and/or varicosities: Abnormal   bilateral pretibial Trace+ pitting edema  Abdomen   Abdomen: Non-tender, no masses  Liver and spleen: No hepatomegaly or splenomegaly  Lymphatic   Palpation of lymph nodes in neck: No lymphadenopathy  Musculoskeletal   Gait and station: Normal     Muscle strength/tone: Normal   Motor Strength Findings: normal upper extremity strength and normal lower extremity strength  Skin   Skin and subcutaneous tissue: Normal without rashes or lesions  Neurologic   Sensation: No sensory loss  Sensory exam: intact to light touch  Psychiatric   Mood and affect: Normal        Results/Data  PHQ-9 Adult Depression Screening [id number] 09:17AM User, [employer]     Test Name Result Flag Reference   PHQ-9 Adult Depression Score 1     Over the last two weeks, how often have you been bothered by any of the following problems?   Little interest or pleasure in doing things: Not at all - 0  Feeling down, depressed, or hopeless: Not at all - 0  Trouble falling or staying asleep, or sleeping too much: Not at all - 0  Feeling tired or having little energy: Several days - 1  Poor appetite or over eating: Not at all - 0  Feeling bad about yourself - or that you are a failure or have let yourself or your family down: Not at all - 0  Trouble concentrating on things, such as reading the newspaper or watching television: Not at all - 0  Moving or speaking so slowly that other people could have noticed  Or the opposite -  being so fidgety or restless that you have been moving around a lot more than usual: Not at all - 0  Thoughts that you would be better off dead, or of hurting yourself in some way: Not at all - 0   PHQ-9 Adult Depression Screening Negative     PHQ-9 Difficulty Level Not difficult at all     PHQ-9 Severity Minimal Depression         Future Appointments    Date/Time Provider Specialty Site   08/20/2018 04:30 PM CLEO Cummings  Obstetrics/Gynecology [facility]   10/27/2017 02:30 PM CLEO Boone   Bariatric Medicine [facility]     Signatures   Electronically signed by : Nury Frank, [facility]; Oct  9 2017 10:00AM EST                       (Author)    Electronically signed by : CLEO Tena ; Oct  9 2017  8:57PM EST

## 2018-01-13 NOTE — PROGRESS NOTES
Assessment    1  Blood tests for routine general physical examination (V72 62) (Z00 00)   2  Encounter for preventive health examination (V70 0) (Z00 00)    Plan  Blood tests for routine general physical examination, Health Maintenance    · (1) CBC/PLT/DIFF; Status:Hold For - Exact Date; Requested for: In Office Collection;    · (1) COMPREHENSIVE METABOLIC PANEL; Status:Hold For - Exact Date; Requested  for: In Office Collection;    · (1) HEMOGLOBIN A1C; Status:Hold For - Exact Date; Requested for: In Office Collection;    · (1) LIPID PANEL, FASTING; Status:Hold For - Exact Date; Requested for: In Office  Collection;    · (1) TSH WITH FT4 REFLEX; Status:Hold For - Exact Date; Requested for: In Office  Collection;    · (1) URINALYSIS w URINE C/S REFLEX (will reflex a microscopy if leukocytes, occult  blood, or nitrites are not within normal limits); Status:Hold For - Exact Date; Requested  for: In Office Collection;    · (1) VITAMIN D 25-HYDROXY; Status:Hold For - Exact Date; Requested for: In [employer]; Discussion/Summary  health maintenance visit Currently, she eats an adequate diet and has an inadequate exercise regimen  the risks and benefits of cervical cancer screening were discussed cervical cancer screening is current Breast cancer screening: the risks and benefits of breast cancer screening were discussed, monthly self breast exam was advised and mammogram is current  Colorectal cancer screening: the risks and benefits of colorectal cancer screening were discussed and colorectal cancer screening is not indicated  Osteoporosis screening: the risks and benefits of osteoporosis screening were discussed  Screening lab work includes hemoglobin, glucose, lipid profile, thyroid function testing, 25-hydroxyvitamin D and urinalysis  The risks and benefits of immunizations were discussed   Advice and education were given regarding nutrition, aerobic exercise, weight bearing exercise, weight loss, calcium supplements, vitamin D supplements, sunscreen use and self skin examination  Patient to return to the office for fasting labs to include CBC, CMP, lipid profile, hemoglobin A1c, TSH with reflex free T4, vitamin D and UA  Discussed low fat and low carbohydrate diet and regular exercise walking 4-5 times a week for 30 minutes  Weight loss encouraged  Patient to return to the office when necessary  Chief Complaint    1  Cold Symptoms  Yearly Wellness Visit      History of Present Illness  HM, Adult Female: The patient is being seen for a health maintenance evaluation  The last health maintenance visit was 2 year(s) ago  General Health: The patient's health since the last visit is described as good  She has regular dental visits  She denies vision problems  Vision care includes wearing glasses  She denies hearing loss  Lifestyle:  She does not have a healthy diet  She has weight concerns  She does not exercise regularly  She does not use tobacco  She consumes alcohol  She reports occasional alcohol use  She denies drug use  Reproductive health: the patient is perimenopausal   she reports normal menses  Menstrual history: The cycles are irregular  Screening: cancer screening reviewed and current  metabolic screening reviewed and updated  HPI: 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 [email or web address] who is here for yearly wellness exam  Patient is feeling well overall and is currently getting over a sinus infection  No regular exercise program  Patient is up-to-date on GYN exam and mammogram  Patient has a routine appointment scheduled with her dermatologist       Active Problems    1  Acute sinusitis (461 9) (J01 90)   2  Acute upper respiratory infection (465 9) (J06 9)   3  Depression, major, recurrent (296 30) (F33 9)   4  Eczema (692 9) (L30 9)   5  Encounter for gynecological examination with Papanicolaou smear of cervix   (V72 31,V76 2) (Z01 419,Z12 4)   6   Encounter for screening mammogram for malignant neoplasm of breast (V76 12)   (Z12 31)   7  Enlarged uterus (621 2) (N85 2)   8  History of allergy (V15 09) (Z88 9)   9  Influenza (487 1) (J11 1)   10  Irregular menses (626 4) (N92 6)   11  Spider vein, symptomatic (448 1) (I78 1)   12  Vertigo (780 4) (R42)    Surgical History    · History of Oral Surgery    Family History    · Family history of Hypertension (V17 49)    · Family history of Cirrhosis   · Family history of Leukemia (V16 6)    · Family history of Diabetes Mellitus (V18 0)    · Family history of Anxiety and depression    · Family history of Acute Myocardial Infarction (V17 3)    Social History    · Being A Social Drinker   · College student   · Daily Coffee Consumption (3  Cups/Day)   · Employed   · Household: Mother   ·    · Never A Smoker    Current Meds   1  Multivitamins TABS; Therapy: (Recorded:14Mar2016) to Recorded   2  Tylenol TABS; Therapy: (Recorded:14Mar2016) to Recorded    Allergies    1  No Known Drug Allergies    Vitals   Recorded: 48IAV1557 02:28PM Recorded: 32TZR1696 02:09PM   Temperature  98 2 F   Heart Rate 72    Respiration 16    Systolic  620   Diastolic  80   Height  5 ft 2 in   Weight  205 lb    BMI Calculated  37 5   BSA Calculated  1 93     Physical Exam    Constitutional   General appearance: No acute distress, well appearing and well nourished  Eyes   Conjunctiva and lids: No swelling, erythema or discharge  Ears, Nose, Mouth, and Throat   External inspection of ears and nose: Normal     Otoscopic examination: Tympanic membranes translucent with normal light reflex  Canals patent without erythema  Oropharynx: Normal with no erythema, edema, exudate or lesions  Pulmonary   Respiratory effort: No increased work of breathing or signs of respiratory distress  Auscultation of lungs: Clear to auscultation  Cardiovascular   Auscultation of heart: Normal rate and rhythm, normal S1 and S2, without murmurs      Examination of extremities for edema and/or varicosities: Normal     Abdomen   Abdomen: Non-tender, no masses  Lymphatic   Palpation of lymph nodes in neck: No lymphadenopathy  Musculoskeletal   Gait and station: Normal     Inspection/palpation of joints, bones, and muscles: Normal     Skin   Skin and subcutaneous tissue: Normal without rashes or lesions  Psychiatric   Orientation to person, place, and time: Normal     Mood and affect: Normal        Procedure    Procedure: Visual Acuity Test    Results: 20/13 in both eyes with corrective device, 20/13 in the right eye with corrective device, 20/13 in the left eye with corrective device   normal red and normal green        Signatures   Electronically signed by : Lianne Kaplan DO; Mar 30 2016  2:39PM EST                       (Author)

## 2018-01-14 VITALS
SYSTOLIC BLOOD PRESSURE: 110 MMHG | BODY MASS INDEX: 39.24 KG/M2 | WEIGHT: 213.25 LBS | DIASTOLIC BLOOD PRESSURE: 82 MMHG | TEMPERATURE: 99.1 F | HEART RATE: 70 BPM | HEIGHT: 62 IN

## 2018-01-14 VITALS — BODY MASS INDEX: 35.58 KG/M2 | HEIGHT: 63 IN | WEIGHT: 200.8 LBS

## 2018-01-14 NOTE — PROGRESS NOTES
History of Present Illness  HPI: Isabel Lopez presented for her 6 month follow-up visit  She has lost 1# in the past week and has had an overall weight loss of 6 4# (3 % TBW) in the past 6 months  Changing over from night to day shift  She stated there is often long periods between meals related to being busy  Consuming 1 shake daily  Not food journaling or exercising  Taking Belviq every morning and not having cravings during the day  We discussed using another meal replacement during the day to help control lunch calories and setting a timer for meals  She is averaging 10,000 steps on work days  Bariatric MNT [facility]:   Weight Assessment: IBW: 1101#, Goal Weight: 180#  Excess calories come from in between meal snacking, large portions at mealtimes and high calorie high fat food choices  Dietary Recall:   Breakfast: Shake  Snack: banana  Lunch: cafe -  salad with chicken  chicken stir zarate  Snack: skip  Dinner: eggs  Snack: skip   Beverages: water  Estimated fluid intake: <64oz  She dines out seldom  Exercise Frequency:  She does not exercise  Her obesity/being overweight is related to excessive energy intake and as evidenced by BMI: 36 6  Nutrition Prescription: Estimated calories for weight loss: Ecal BMR: 1525 weight loss 830-1097 calories, Estimated daily protein needs: 60-75gm ( 1 2-1 5 gm/kg IBW) and Estimated daily fluid needs: 58oz ( 35ml/kg IBW)  Nutrition Intervention: Counseling provided with emphasis on meal planning, portion sizes, healthy snack choices, hydration and fiber intake  Education materials provided: [employer] manual    Comprehension: good  Expected Compliance: good     Goals: follow meal plan prescribed, reduce portion sizes at mealtimes, plan meals and snacks daily, Choose lower-calorie, lower-fat meal options at home and when dining out, food journal, do not skip meals or snacks, increase fluid intake 64oz  and 1200 calories using 1-2 meal replacements daily   Monitor/Evaluation: weekly weight, food journal, fluid intake and exercise level  Active Problems    1  Adult BMI > 30 (V85 30) (E66 8)   2  Depression, major, recurrent (296 30) (F33 9)   3  Eczema (692 9) (L30 9)   4  Enlarged uterus (621 2) (N85 2)   5  History of allergy (V15 09) (Z88 9)   6  Irregular menses (626 4) (N92 6)   7  Spider vein, symptomatic (448 1) (I78 1)   8  Vertigo (780 4) (R42)   9  Weight gain (783 1) (R63 5)    Past Medical History    1  History of Acute upper respiratory infection (465 9) (J06 9)   2  History of Blood tests for routine general physical examination (V72 62) (Z00 00)   3  History of Encounter for gynecological examination with Papanicolaou smear of cervix   (V72 31,V76 2) (Z01 419,Z12 4)   4  History of Encounter for screening mammogram for malignant neoplasm of breast   (V76 12) (Z12 31)   5  History of acute sinusitis (V12 69) (Z87 09)   6  History of influenza (V12 09) (Z87 09)    Surgical History    1  History of Oral Surgery    Family History  Mother    1  Family history of Hypertension (V17 49)  Father    2  Family history of Cirrhosis   3  Family history of Leukemia (V16 6)  Maternal Grandmother    4  Family history of Diabetes Mellitus (V18 0)  Paternal Grandmother    11  Family history of Anxiety and depression  Paternal Grandfather    6  Family history of Acute Myocardial Infarction (V17 3)    Social History    · Being A Social Drinker   · College student   · Daily Coffee Consumption (3  Cups/Day)   · Employed   · Household: Mother   ·    · Never A Smoker    Current Meds   1  Belviq XR 20 MG Oral Tablet Extended Release 24 Hour; Take 1 tablet daily; Therapy: 34ROZ6880 to (Evaluate:16Apr2017); Last Rx:16Jan2017 Ordered    Allergies    1   No Known Drug Allergies    Vitals  Signs   Recorded: 64MHQ0136 01:09PM   Height: 5 ft 2 5 in  Weight: 203 lb 9 6 oz  BMI Calculated: 36 65  BSA Calculated: 1 94    Future Appointments    Date/Time Provider Specialty Site   02/16/2017 10:00 AM Terence [address] [facility]   02/02/2017 10:00 AM Coni Spivey [facility]   04/20/2017 10:00 AM CLEO Acosta   Bariatric Medicine [facility]     Signatures   Electronically signed by : Margareth Pinon, ; Jan 23 2017  3:06PM EST                       (Author)    Electronically signed by : CLEO Mays ; Jan 23 2017  3:17PM EST                       (Co-author)

## 2018-01-15 NOTE — PROGRESS NOTES
History of Present Illness  HPI: Griselda Leon presented for her 6 month follow-up visit  She has lost 2 2# in the past 2 weeks and has had an overall weight loss of 4 6# ( 2 1 % TBW) in the past6 months  She stated she filled Belviq prescription  She is nottaking it on a routine basis  Emphasized the importance of taking the medication consistently  She is now working day shift  She still struggles with skipping breakfast  She is not food journaling but plans to start  We reviewed her meal plan and calorie level  Bariatric MNT MWM [facility]:   Weight Assessment: IBW: 110#, Goal Weight: ST#  Excess calories come from in between meal snacking, large portions at mealtimes and high calorie high fat food choices  Dietary Recall:   Beverages: vitamin water/ water/ cranjuice   She dines out 2-3 times per week  Exercise Frequency:  She does not exercise  Her obesity/being overweight is related to excessive energy intake and as evidenced by BMI 36 9  Nutrition Prescription: Estimated calories for weight loss: Ecal BMR:         Breakfast: Shake  Snack: 200  Lunch: 300  Snack: 200  Dinner: 300 / [employer]  Nutrition Intervention: Counseling provided with emphasis on meal planning, portion sizes, healthy snack choices, hydration, fiber intake and protein intake  Education materials provided: New Direction manual, calorie controlled menus, low carbohydrate meal planning and low carb, high protein snack choices  Comprehension: good  Expected Compliance: good  Goals: follow meal plan prescribed, reduce portion sizes at mealtimes, plan meals and snacks daily, Choose lower-calorie, lower-fat meal options at home and when dining out, food journal, do not skip meals or snacks and 1200 calories using 1-2 meal replacements and 1 bar  Monitor/Evaluation: weekly weight, food journal, fluid intake and exercise level  Active Problems    1  Adult BMI > 30 (V85 30) (E66 8)   2   Depression, major, recurrent (296 30) (F33 9)   3  Eczema (692 9) (L30 9)   4  Enlarged uterus (621 2) (N85 2)   5  History of allergy (V15 09) (Z88 9)   6  Irregular menses (626 4) (N92 6)   7  Spider vein, symptomatic (448 1) (I78 1)   8  Vertigo (780 4) (R42)   9  Weight gain (783 1) (R63 5)    Past Medical History    1  History of Acute upper respiratory infection (465 9) (J06 9)   2  History of Blood tests for routine general physical examination (V72 62) (Z00 00)   3  History of Encounter for gynecological examination with Papanicolaou smear of cervix   (V72 31,V76 2) (Z01 419,Z12 4)   4  History of Encounter for screening mammogram for malignant neoplasm of breast   (V76 12) (Z12 31)   5  History of acute sinusitis (V12 69) (Z87 09)   6  History of influenza (V12 09) (Z87 09)    Surgical History    1  History of Oral Surgery    Family History  Mother    1  Family history of Hypertension (V17 49)  Father    2  Family history of Cirrhosis   3  Family history of Leukemia (V16 6)  Maternal Grandmother    4  Family history of Diabetes Mellitus (V18 0)  Paternal Grandmother    11  Family history of Anxiety and depression  Paternal Grandfather    6  Family history of Acute Myocardial Infarction (V17 3)    Social History    · Being A Social Drinker   · College student   · Daily Coffee Consumption (3  Cups/Day)   · Employed   · Household: Mother   ·    · Never A Smoker    Current Meds   1  Belviq XR 20 MG Oral Tablet Extended Release 24 Hour; Take 1 tablet daily; Therapy: [id number] to (Evaluate:27Jan2017); Last Rx:28Nov2016 Ordered    Allergies    1   No Known Drug Allergies    Vitals  Signs   Recorded: 58JAO1913 01:36PM   Height: 5 ft 2 5 in  Weight: 205 lb 6 4 oz  BMI Calculated: 36 97  BSA Calculated: 1 94    Future Appointments    Date/Time Provider Specialty Site   01/18/2017 11:00 AM Romaine Garcia  [facility]   02/16/2017 10:00 AM Terence Fisher [facility]   02/02/2017 10:00 AM Jayro Castro  [facility]   01/16/2017 09:15 AM CLEO Henning   Bariatric Medicine [facility]     Signatures   Electronically signed by : Kwabena Gallegos, ; Jan 6 2017 12:30PM EST                       (Author)    Electronically signed by : CLEO John ; Jan 6 2017 12:59PM EST                       (Co-author)

## 2018-01-15 NOTE — PROGRESS NOTES
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  Night shift get off work at 7:00am, eats before leaving, sleeps at least 8 hours and then snacks upon rising  Picks up dinner at work but does not eat it until midnight  In between this she is snacking on whatever is available  She does pack her own snacks but picks what is at work over what she has brought often  Willing to incorporate shakes into meal plan to avoid eating so many snacks  Bariatric MNT M[facility]:   Weight Assessment: IBW: 110, ABW: 135 lbs, Goal Weight: # #  Weight loss attempts: Weight Watchers: "whole life" 20# lbs  Excess calories come from in between meal snacking, large portions at mealtimes and high calorie high fat food choices  Dietary Recall:   Breakfast: off work  cafe  coffee - creamer   oatmeal or eggs/ blevins/ potato   juice  Snack: 8:00-home- sleep 7-8hrs     Lunch: sleep  Snack: 4:30pm - raisin toast/ yogurt  smoothie  Dinner:   Work - 7:00pm  Snack:yogurt   sweets at work in breakroom   12:00pm  cafe- salad bar   with grilled chicken   raspberry vingagrette - more  larger protein/ carb portion at home  or bring leftovers  Snacking: sweets to stay awake - vending machine  Beverages: coffee with creamer - free pour -water  Estimated fluid intake: >64oz  Alcohol consumption: occasionnal    Food allergies/intolerances: none  Cooking:   Shopping:    Exercise Frequency:  She does not exercise  Her obesity/being overweight is related to excess energy intake and as evidenced by BMI=38 4  Nutrition Prescription: Estimated calories for weight loss: Ecal BMR: , Estimated daily protein needs: 60-75 gm (1 2-1 5 gm/kg IBW) and Estimated daily fluid needs: 58 oz (35 cc/kg IBW)  Breakfast: Shake  Snack: 150  Lunch: Shake  Snack: 300  Dinner: 150  Snack: 150   Nutrition Intervention: Counseling provided with emphasis on meal planning, portion sizes, healthy snack choices, hydration, fiber intake, protein intake, exercise and food journaling  Education materials provided: [employer] Direction manual, calorie controlled menus, low carb, high protein snack choices and food journal tips  Comprehension: good  Expected Compliance: good  Goals: follow meal plan prescribed, plan meals and snacks daily, food journal and do not skip meals or snacks  Monitor/Evaluation: weekly weight, food journal, fluid intake and exercise level  Active Problems    1  Acute sinusitis (461 9) (J01 90)   2  Acute upper respiratory infection (465 9) (J06 9)   3  Adult BMI > 30 (V85 30) (E66 8)   4  Blood tests for routine general physical examination (V72 62) (Z00 00)   5  Depression, major, recurrent (296 30) (F33 9)   6  Eczema (692 9) (L30 9)   7  Encounter for gynecological examination with Papanicolaou smear of cervix   (V72 31,V76 2) (Z01 419,Z12 4)   8  Encounter for screening mammogram for malignant neoplasm of breast (V76 12)   (Z12 31)   9  Enlarged uterus (621 2) (N85 2)   10  History of allergy (V15 09) (Z88 9)   11  Influenza (487 1) (J11 1)   12  Irregular menses (626 4) (N92 6)   13  Spider vein, symptomatic (448 1) (I78 1)   14  Vertigo (780 4) (R42)   15  Weight gain (783 1) (R63 5)    Surgical History    1  History of Oral Surgery    Family History  Mother    1  Family history of Hypertension (V17 49)  Father    2  Family history of Cirrhosis   3  Family history of Leukemia (V16 6)  Maternal Grandmother    4  Family history of Diabetes Mellitus (V18 0)  Paternal Grandmother    11  Family history of Anxiety and depression  Paternal Grandfather    6  Family history of Acute Myocardial Infarction (V17 3)    Social History    · Being A Social Drinker   · College student   · Daily Coffee Consumption (3  Cups/Day)   · Employed   · Household:  Mother   ·    · Never A Smoker    Current Meds   1  Multivitamins TABS; Therapy: (Recorded:14Mar2016) to Recorded   2  Tylenol TABS; Therapy: (Recorded:14Mar2016) to Recorded    Allergies    1  No Known Drug Allergies    Results/Data  Tanita Flowsheet 45ZFF1257 03:44PM Shai Gosling     Test Name Result Flag Reference   Height 62     Weight 210     Body Fat % 43 6     Fat Mass 91 6     FFM ( Fat Free Mass) 118 4     Muscle Mass 112 4     TBW ( Total Body Water) 84 2     TBW % 40 1     Bone Mass 6 0     BMR ( Basal Metabolic Rate) 6463     Metabolic Age 64     Visceral Fat Rating 12     BMI 38 4         Future Appointments    Date/Time Provider Specialty Site   10/10/2016 02:45 PM CLEO Cadena   Bariatric Medicine [facility]     Signatures   Electronically signed by : Loulou Beyer, ; Jul 18 2016  4:47PM EST                       (Author)    Electronically signed by : CLEO Laguna ; Jul 19 2016  7:45AM EST                       (Co-author)

## 2018-01-15 NOTE — PROGRESS NOTES
Chief Complaint  Chief Complaint Free Text Note Form: Patient is here today for 6 week MWM Follow-up  History of Present Illness  HPI: Recently switched today shift from nights and is struggling with adjustment  Skips breakfast from time to time and not eating regularly  Had not been consistently taking belviq until 1 week ago      Past Medical History    1  History of Acute upper respiratory infection (465 9) (J06 9)   2  History of Blood tests for routine general physical examination (V72 62) (Z00 00)   3  History of Encounter for gynecological examination with Papanicolaou smear of cervix   (V72 31,V76 2) (Z01 419,Z12 4)   4  History of Encounter for screening mammogram for malignant neoplasm of breast   (V76 12) (Z12 31)   5  History of acute sinusitis (V12 69) (Z87 09)   6  History of influenza (V12 09) (Z87 09)    Assessment    1  Weight gain (783 1) (R63 5)   2  Adult BMI > 30 (V85 30) (E66 8)    Plan  Adult BMI > 30, Weight gain    1  Belviq XR 20 MG Oral Tablet Extended Release 24 Hour; Take 1 tablet daily    Discussion/Summary  Discussion Summary:   56 yo female w/ obesity here to pursue medical weight management to improve weight and health  Obesity class 2:  -Had enrolled in MADISYN program but with inconsistent follow-up, now enrolled in our MADISYN program alternate tract  -initial focus of 5-10% weight loss over 3-6 mos for improved health  -screening labs-recently completed and within acceptable range  -On Belviq XR and tolerating ok    Return to clinic in 3 months to see me    Goals:  1  No skipping breakfast-eat before leaving the house for work  2  At least 64 ounces of water daily  3  Create a daily checklist for weight loss goals as recommended by our team  Goals and Barriers: The patient has the current Goals:   Goals:  1  No skipping breakfast-eat before leaving the house for work  2  At least 64 ounces of water daily  3   Create a daily checklist for weight loss goals as recommended by our teamPatient's Capacity to Self-Care: Patient is able to Self-Care  Understands and agrees with treatment plan: The treatment plan was reviewed with the patient/guardian  The patient/guardian understands and agrees with the treatment plan      Active Problems    1  Adult BMI > 30 (V85 30) (E66 8)   2  Depression, major, recurrent (296 30) (F33 9)   3  Eczema (692 9) (L30 9)   4  Enlarged uterus (621 2) (N85 2)   5  History of allergy (V15 09) (Z88 9)   6  Irregular menses (626 4) (N92 6)   7  Spider vein, symptomatic (448 1) (I78 1)   8  Vertigo (780 4) (R42)   9  Weight gain (783 1) (R63 5)    Surgical History    1  History of Oral Surgery    Family History  Mother    1  Family history of Hypertension (V17 49)  Father    2  Family history of Cirrhosis   3  Family history of Leukemia (V16 6)  Maternal Grandmother    4  Family history of Diabetes Mellitus (V18 0)  Paternal Grandmother    11  Family history of Anxiety and depression  Paternal Grandfather    6  Family history of Acute Myocardial Infarction (V17 3)    Social History    · Being A Social Drinker   · College student   · Daily Coffee Consumption (3  Cups/Day)   · Employed   · Household: Mother   ·    · Never A Smoker    Current Meds   1  Belviq XR 20 MG Oral Tablet Extended Release 24 Hour; Take 1 tablet daily; Therapy: 04IQZ1201 to (Evaluate:27Jan2017); Last Rx:28Nov2016 Ordered    Allergies    1  No Known Drug Allergies    Vitals  Vital Signs    Recorded: 27EBZ2036 09:29AM   Temperature 98 4 F, Tympanic   Heart Rate 84   Systolic 668   Diastolic 64   Height 5 ft 2 5 in   Weight 204 lb 9 6 oz   BMI Calculated 36 83   BSA Calculated 1 94     Physical Exam    Constitutional   General appearance: Abnormal   well developed and obese  Eyes No conjunctival pallor  Ears, Nose, Mouth, and Throat Moist oral mucosa  Pulmonary   Respiratory effort: No increased work of breathing or signs of respiratory distress      Auscultation of lungs: Clear to auscultation  Cardiovascular   Auscultation of heart: Normal rate and rhythm, normal S1 and S2, without murmurs  Abdomen   Abdomen: Abnormal   The abdomen was obese  The abdomen was soft and nontender     Musculoskeletal   Gait and station: Normal     Psychiatric   Orientation to person, place, and time: Normal     Mood and affect: Normal          Future Appointments    Date/Time Provider Specialty Site   01/18/2017 11:00 AM Terence Fisher [facility]   02/16/2017 10:00 AM Terence Fisher [facility]   02/02/2017 10:00 AM Kina Spivey  [place] [address] [facility]EMENT CENTER     Signatures   Electronically signed by : CLEO Laguna ; Jan 16 2017  9:48AM EST                       (Author)

## 2018-01-15 NOTE — PROGRESS NOTES
Chief Complaint  Chief Complaint Free Text Note Form: Patient is here today for a 3 month MWM follow-up  History of Present Illness  HPI: Teeth clenching with Contrave-stopped  More consistent with lifestyle changes and 9 lb weight loss since last visit      Past Medical History    1  History of Acute upper respiratory infection (465 9) (J06 9)   2  History of Blood tests for routine general physical examination (V72 62) (Z00 00)   3  History of Encounter for gynecological examination with Papanicolaou smear of cervix   (V72 31) (Z01 419)   4  History of acute sinusitis (V12 69) (Z87 09)   5  History of influenza (V12 09) (Z87 09)   6  History of Yeast infection (112 9) (B37 9)    Assessment    1  Severe obesity (BMI 35 0-35 9 with comorbidity) (278 01,V85 35) (E66 01,Z68 35)   2  Depression, major, recurrent (296 30) (F33 9)    Plan  Depression, major, recurrent, Severe obesity (BMI 35 0-35 9 with comorbidity), Weight  gain    1  Contrave 8-90 MG Oral Tablet Extended Release 12 Hour  Severe obesity (BMI 35 0-35 9 with comorbidity)    2  ECG 12-LEAD; Status:Hold For - Scheduling; Requested ZXX:09HMT1230;     Discussion/Summary  Discussion Summary:   53 yo female w/ obesity here to pursue medical weight management to improve weight and health  Initial: 205  Current: 203  Change: -2lbs    Obesity class 2:  -completed 12 week MADISYN program-inconsistent w/ lifestyle changes  has been in our program for 1 year  -initial focus of 5-10% weight loss over 3-6 mos for improved health(not met)  -Did not tolerate Belviq XR  STOPPED  -Did not tolerate contrave-STOPPED  -check EKG if within acceptable limits-will trial phentermine    Depression:  -had improved while on Contrave  -consider another AD that is weight neutral in the future if this continues to be an issue    Return to clinic in 2 months to see me  Patient's Capacity to Self-Care: Patient is able to Self-Care     Medication SE Review and Pt Understands Tx: Possible side effects of new medications were reviewed with the patient/guardian today  The treatment plan was reviewed with the patient/guardian  The patient/guardian understands and agrees with the treatment plan   Bariatric Medicine Diagnostic Constellation:   Patient Discussion: 40 minute visit, greater than half of the time was spent on counseling  Counseling spent on dietary behavior and exercise modification for weight loss  Counseled patient on other weight loss medications and side effect profiles as well as mechanisms of action  Patient had multiple questions with repeat questions and ultimately they were answered to her satisfaction  Understands and agrees with treatment plan: The treatment plan was reviewed with the patient/guardian  The patient/guardian understands and agrees with the treatment plan      Active Problems    1  Adult BMI > 30 (V85 30)   2  Depression, major, recurrent (296 30) (F33 9)   3  Eczema (692 9) (L30 9)   4  Encounter for gynecological examination (V72 31) (Z01 419)   5  Encounter for screening mammogram for malignant neoplasm of breast (V76 12)   (Z12 31)   6  Enlarged uterus (621 2) (N85 2)   7  Fatigue (780 79) (R53 83)   8  Hand dermatitis (692 9) (L30 9)   9  History of allergy (V15 09) (Z88 9)   10  Irregular menses (626 4) (N92 6)   11  Need for tetanus booster (V03 7) (Z23)   12  Severe obesity (BMI 35 0-35 9 with comorbidity) (278 01,V85 35) (E66 01,Z68 35)   13  Spider vein, symptomatic (448 1) (I78 1)   14  Varicose veins (454 9) (I83 90)   15  Vertigo (780 4) (R42)   16  Weight gain (783 1) (R63 5)    Surgical History    1  History of Oral Surgery   2  History of Root Canal With Apicoectomy    Family History  Mother    1  Family history of hypertension (V17 49) (Z82 49)   2  Family history of rheumatoid arthritis (V17 7) (Z82 61)  Father    3  Family history of Cirrhosis   4  Family history of leukemia (V16 6) (Z80 6)  Maternal Grandmother    5   Family history of diabetes mellitus (V18 0) (Z83 3)   6  Family history of hypertension (V17 49) (Z82 49)   7  Family history of pulmonary hypertension (V17 49) (Z82 49)  Paternal Grandmother    6  Family history of Anxiety and depression   9  Family history of dementia (V17 2) (Z81 8)   10  Family history of glaucoma (V19 11) (Z83 511)   11  Family history of hypertension (V17 49) (Z82 49)  Maternal Grandfather    12  Family history of malignant neoplasm (V16 9) (Z80 9)  Paternal Grandfather    15  Family history of lung cancer (V16 1) (Z80 1)   14  Family history of myocardial infarction (V17 3) (Z80 55)    Social History    · Being A Social Drinker   · College student   · Daily Coffee Consumption (2  Cups/Day)   · Employed   · Household: Mother   ·    · Never a smoker    Current Meds   1  Contrave 8-90 MG Oral Tablet Extended Release 12 Hour; TAKE TWO TABLETS TWICE A   DAY, AS DIRECTED; Therapy: 69UVF1751 to (Last Rx:30Fow1214)  Requested for: 38Gda7969 Ordered   2  Multi-Vitamin TABS; Therapy: (Recorded:44Psr0941) to Recorded    Allergies    1  No Known Drug Allergies    Vitals  Vital Signs    Recorded: 31TDO6201 03:58PM   Temperature 98 8 F   Heart Rate 78   Systolic 207, LUE, Sitting   Diastolic 80, LUE, Sitting   Height 5 ft 2 in   Weight 203 lb 4 8 oz   BMI Calculated 37 18   BSA Calculated 1 92     Future Appointments    Date/Time Provider Specialty Site   08/20/2018 04:30 PM CLEO Hall   Obstetrics/Gynecology [facility] [address]     Signatures   Electronically signed by : CLEO Martinez ; Nov 7 2017  4:43PM EST                       (Author)

## 2018-01-15 NOTE — PROGRESS NOTES
Chief Complaint  Chief Complaint Free Text Note Form: Patient here for [facility] follow up; reports she is coming from having oral surgery  History of Present Illness  HPI: Patient not seen in office since 2/2017  did not tolerate Belviq-"foggy" feeling  Not consistent with lifestyle changes-is not making this a priority      Past Medical History    1  History of Acute upper respiratory infection (465 9) (J06 9)   2  History of Blood tests for routine general physical examination (V72 62) (Z00 00)   3  History of Encounter for gynecological examination with Papanicolaou smear of cervix   (V72 31) (Z01 419)   4  History of acute sinusitis (V12 69) (Z87 09)   5  History of influenza (V12 09) (Z87 09)    Assessment    1  Severe obesity (BMI 35 0-35 9 with comorbidity) (278 01,V85 35) (E66 01,Z68 35)   2  Weight gain (783 1) (R63 5)   3  Depression, major, recurrent (296 30) (F33 9)    Plan  Depression, major, recurrent, Severe obesity (BMI 35 0-35 9 with comorbidity), Weight  gain    1  Contrave 8-90 MG Oral Tablet Extended Release 12 Hour; TAKE TWO TABLETS   TWICE A DAY, AS DIRECTED    Discussion/Summary  Discussion Summary:   51 yo female w/ obesity here to pursue medical weight management to improve weight and health  Initial: 205  Current: 214  Change: +9lbs    Obesity class 2:  -completed 12 week MADISYN program-inconsistent w/ lifestyle changes  has been in our program for 1 year  -initial focus of 5-10% weight loss over 3-6 mos for improved health(not met)  -Did not tolerate Belviq XR  STOPPED  -trial of contrave-given component of depression and c/o cravings  side effect profile reviewed    Return to clinic in 3 months to see me    Goals:  1  No skipping breakfast-eat before leaving the house for work  2  At least 64 ounces of water daily  3  Create a daily checklist for weight loss goals as recommended by our team  Goals and Barriers:  The patient has the current Goals: Start contrave  food log  4166-2920 calories  Patient's Capacity to Self-Care: Patient is able to Self-Care  Medication SE Review and Pt Understands Tx: Possible side effects of new medications were reviewed with the patient/guardian today  The treatment plan was reviewed with the patient/guardian  The patient/guardian understands and agrees with the treatment plan   Bariatric Medicine Diagnostic Constellation:   Patient Discussion: 25 minute visit, greater than half of the time was spent on counseling  Counseled patient on the role of weight loss medications, its mechanisms of action and side effect profile  Counts patient on dietary behavioral and excised modification  Understands and agrees with treatment plan: The treatment plan was reviewed with the patient/guardian  The patient/guardian understands and agrees with the treatment plan      Active Problems    1  Adult BMI > 30 (V85 30) (E66 8)   2  Depression, major, recurrent (296 30) (F33 9)   3  Eczema (692 9) (L30 9)   4  Encounter for screening mammogram for malignant neoplasm of breast (V76 12)   (Z12 31)   5  Enlarged uterus (621 2) (N85 2)   6  History of allergy (V15 09) (Z88 9)   7  Irregular menses (626 4) (N92 6)   8  Spider vein, symptomatic (448 1) (I78 1)   9  Vertigo (780 4) (R42)   10  Weight gain (783 1) (R63 5)    Surgical History    1  History of Oral Surgery    Family History  Mother    1  Family history of hypertension (V17 49) (Z82 49)  Father    2  Family history of Cirrhosis   3  Family history of leukemia (V16 6) (Z80 6)  Maternal Grandmother    4  Family history of diabetes mellitus (V18 0) (Z83 3)  Paternal Grandmother    11  Family history of Anxiety and depression  Paternal Grandfather    6  Family history of myocardial infarction (V17 3) (Z80 55)    Social History    · Being A Social Drinker   · College student   · Daily Coffee Consumption (3  Cups/Day)   · Employed   · Household: Mother   ·    · Never A Smoker    Current Meds   1   Multi-Vitamin TABS; Therapy: (Recorded:34Fbq5269) to Recorded    Allergies    1  No Known Drug Allergies    Vitals  Vital Signs    Recorded: 69EHJ6009 10:58AM   Temperature 98 2 F   Heart Rate 84   Respiration 16   Systolic 403   Diastolic 80   Height 5 ft 2 5 in   Weight 214 lb    BMI Calculated 38 52   BSA Calculated 1 98     Future Appointments    Date/Time Provider Specialty Site   07/28/2017 11:20 AM Bao Yu, [facility] [address] [facility]   08/16/2017 06:15 PM CLEO Munson  [facility]   10/27/2017 02:30 PM CLEO Rea   Bariatric Medicine [facility]     Signatures   Electronically signed by : CLEO Ng ; Jul 26 2017 11:52AM EST                       (Author)

## 2018-01-15 NOTE — RESULT NOTES
Verified Results  (1) LYME ANTIBODY, WESTERN BLOT [id number] 12:44PM Mari Esparza Order Number: [id number]     Test Name Result Flag Reference   LYME 18 KD IGG Absent     LYME 23 KD IGG Absent     LYME 28 KD IGG Absent     LYME 30 KD IGG Absent     LYME 39 KD IGG Present A    LYME 41 KD IGG Present A    LYME 45 KD IGG Absent     LYME 58 KD IGG Absent     LYME 66 KD IGG Absent     LYME 93 KD IGG Absent     LYME 23 KD IGM Absent     LYME 39 KD IGM Absent     LYME 41 KD IGM Absent     LYME IGG WB INTERP  Negative     Positive: 5 of the following                                 Borrelia-specific bands:                                 18,23,28,30,39,41,45,58,                                 66, and 93  Negative: No bands or banding                                 patterns which do not                                 meet positive criteria  LYME IGM WB INTERP  Negative     Note: An equivocal or positive EIA result followed by a negative  Western Blot result is considered NEGATIVE  An equivocal or positive  EIA result followed by a positive Western Blot is considered POSITIVE  by the CDC  Positive: 2 of the following bands: 23,39 or 41  Negative: No bands or banding patterns which do not meet positive  criteria  Criteria for positivity are those recommended by CDC/ASTPHLD   p23=Osp C, h20=dfthbeitq  Note:  Sera from individuals with the following may cross react in the  Lyme Western Blot assays: other spirochetal diseases (periodontal  disease, leptospirosis, relapsing fever, yaws, and pinta);  connective autoimmune (Rheumatoid Arthritis and Systemic Lupus  Erythematosus and also individuals with Antinuclear Antibody);  other infections COFFEE [facility] Spotted Fever; Fausto-Barr Virus,  and Cytomegalovirus)      Performed at:  [address] [place] [address]  [id number]  : Serenity Chiu MD, Phone:  [phone]

## 2018-01-16 NOTE — PROGRESS NOTES
Chief Complaint  Chief Complaint Free Text Note Form: Patient in office today for overdue MWM follow up  History of Present Illness  HPI: -Enrolled in intensive lifestyle intervention program but with inconsistent follow-up after first 2 months  Patient was last seen in our office 9/2016-due to working night shift and picking up extra hours did not have time   -Did not feel like the classes were helpful because she already knew the information  -Regained all of her lost weight  -Interested in weight loss medications      Past Medical History    1  History of Acute upper respiratory infection (465 9) (J06 9)   2  History of Blood tests for routine general physical examination (V72 62) (Z00 00)   3  History of Encounter for gynecological examination with Papanicolaou smear of cervix   (V72 31,V76 2) (Z01 419,Z12 4)   4  History of Encounter for screening mammogram for malignant neoplasm of breast   (V76 12) (Z12 31)   5  History of acute sinusitis (V12 69) (Z87 09)   6  History of influenza (V12 09) (Z87 09)    Assessment    1  Weight gain (783 1) (R63 5)   2  Adult BMI > 30 (V85 30) (E66 8)    Plan  Adult BMI > 30, Weight gain    1  Belviq XR 20 MG Oral Tablet Extended Release 24 Hour; Take 1 tablet daily  Unlinked    2  Multivitamins TABS   3  Tylenol TABS    Discussion/Summary  Discussion Summary:   54 yo female w/ obesity here to pursue medical weight management to improve weight and health  Obesity class 2:  -Had enrolled in [facility] program but with inconsistent follow-up  -initial focus of 5-10% weight loss over 3-6 mos for improved health  -screening labs-recently completed and within acceptable range  -Start belviq XR  side effect profile reviewed w/ patient    Patient wishes to reenroll in our intensive lifestyle intervention program however will do the alternate track to finish up third month(individual schedule visits every 2 weeks as opposed to weekly classes)   I will see her again in 6-8 weeks  Bariatric Medicine [facility]:   Patient Discussion: 40 minute visit, greater than half of the time was spent on counseling  Counseled patient on the importance of consistency in lifestyle changes including dietary, behavioral and excess modification  Counseled patient on weight loss medications including side effect profiles and the mechanisms of action      Active Problems    1  Adult BMI > 30 (V85 30) (E66 8)   2  Depression, major, recurrent (296 30) (F33 9)   3  Eczema (692 9) (L30 9)   4  Enlarged uterus (621 2) (N85 2)   5  History of allergy (V15 09) (Z88 9)   6  Irregular menses (626 4) (N92 6)   7  Spider vein, symptomatic (448 1) (I78 1)   8  Vertigo (780 4) (R42)   9  Weight gain (783 1) (R63 5)    Surgical History    1  History of Oral Surgery    Family History  Mother    1  Family history of Hypertension (V17 49)  Father    2  Family history of Cirrhosis   3  Family history of Leukemia (V16 6)  Maternal Grandmother    4  Family history of Diabetes Mellitus (V18 0)  Paternal Grandmother    11  Family history of Anxiety and depression  Paternal Grandfather    6  Family history of Acute Myocardial Infarction (V17 3)    Social History    · Being A Social Drinker   · College student   · Daily Coffee Consumption (3  Cups/Day)   · Employed   · Household: Mother   ·    · Never A Smoker    Current Meds   1  Multivitamins TABS; Therapy: (Recorded:14Mar2016) to Recorded   2  Tylenol TABS; Therapy: (Recorded:14Mar2016) to Recorded    Allergies    1   No Known Drug Allergies    Vitals  Vital Signs    Recorded: [id number] 08:27AM   Temperature 96 4 F   Heart Rate 72   Respiration 18   Systolic 879   Diastolic 70   Height 5 ft 2 5 in   Weight 205 lb 8 0 oz   BMI Calculated 36 99   BSA Calculated 1 95     Signatures   Electronically signed by : CLEO Martinez ; Nov 28 2016  9:43AM EST                       (Author)

## 2018-01-16 NOTE — PROGRESS NOTES
History of Present Illness  HPI: Tapan Jacobs presented for her 7 month follow-up visit and REE test with the [employer]  She has ha a 1 6# weight gain in the past 2 weeks and has had an overall weight loss of 7 6# ( 3 6% TBW) in the past 7 months  Her body composition fluid shift was greater than 5# and thus not a fair comparison- will retake on 4/20 at Dr Michael Beyer visit  She is taking Belviq - helps with quantity at meals  She stated she still struggles with snacking when she gets home from work and late at night  Bariatric MNT M[facility]:   Weight Assessment: IBW: 110#, Goal Weight: #  Excess calories come from in between meal snacking, large portions at mealtimes and high calorie high fat food choices  Dietary Recall:   Breakfast: Shake  cereal when home  Snack: skip  Lunch: Later lunch  Snack: skip  snacking when home  Dinner: lean protein   veggies  Snack: snacking when home   Beverages:   Estimated fluid intake: 64oz  She dines out occasionally  Exercise Frequency:  She does not exercise  Her obesity/being overweight is related to excessive energy intake and as evidenced by BMI: 36 1  Nutrition Prescription: Estimated calories for weight loss: Reevue REE: 1642 Weight loss without excess: 1143 calories , Estimated daily protein needs: 60-75gm ( 1 2-1 5gm/kg IBW)  and Estimated daily fluid needs: 58oz ( 35ml/kg IBW)   Nutrition Intervention: Counseling provided with emphasis on meal planning, portion sizes, healthy snack choices, hydration, fiber intake, protein intake, exercise and food journaling  Education materials provided: [employer]irection manual    Comprehension: good  Expected Compliance: good  Goals: follow meal plan prescribed, reduce portion sizes at mealtimes, plan meals and snacks daily, Choose lower-calorie, lower-fat meal options at home and when dining out, food journal, do not skip meals or snacks and 1200 calories      Monitor/Evaluation: weekly weight, food journal, fluid intake and exercise level  Active Problems    1  Adult BMI > 30 (V85 30) (E66 8)   2  Depression, major, recurrent (296 30) (F33 9)   3  Eczema (692 9) (L30 9)   4  Enlarged uterus (621 2) (N85 2)   5  History of allergy (V15 09) (Z88 9)   6  Irregular menses (626 4) (N92 6)   7  Spider vein, symptomatic (448 1) (I78 1)   8  Vertigo (780 4) (R42)   9  Weight gain (783 1) (R63 5)    Past Medical History    1  History of Acute upper respiratory infection (465 9) (J06 9)   2  History of Blood tests for routine general physical examination (V72 62) (Z00 00)   3  History of Encounter for gynecological examination with Papanicolaou smear of cervix   (V72 31,V76 2) (Z01 419,Z12 4)   4  History of Encounter for screening mammogram for malignant neoplasm of breast   (V76 12) (Z12 31)   5  History of acute sinusitis (V12 69) (Z87 09)   6  History of influenza (V12 09) (Z87 09)    Surgical History    1  History of Oral Surgery    Family History  Mother    1  Family history of Hypertension (V17 49)  Father    2  Family history of Cirrhosis   3  Family history of Leukemia (V16 6)  Maternal Grandmother    4  Family history of Diabetes Mellitus (V18 0)  Paternal Grandmother    11  Family history of Anxiety and depression  Paternal Grandfather    6  Family history of Acute Myocardial Infarction (V17 3)    Social History    · Being A Social Drinker   · College student   · Daily Coffee Consumption (3  Cups/Day)   · Employed   · Household: Mother   ·    · Never A Smoker    Current Meds   1  Belviq XR 20 MG Oral Tablet Extended Release 24 Hour; Take 1 tablet daily; Therapy: [id number] to (Evaluate:16Apr2017); Last Rx:16Jan2017 Ordered    Allergies    1   No Known Drug Allergies    Results/Data  [employer] Flowsheet [id number] 12:13PM Jessica Dominique     Test Name Result Flag Reference   BMI 37     Visceral Fat Rating 12     Metabolic Age 64     BMR ( Basal Metabolic Rate) 4073     Bone Mass 5 6 TBW % 37 6     TBW ( Total Body Water) 76 2     Muscle Mass 103 4     FFM ( Fat Free Mass) 109     Fat Mass 93 4     Body Fat % 46 1     Weight 202 4     Height 62       Metabolic Analyzer - POC [id number] 12:08PM Romaine Garcia     Test Name Result Flag Reference   Metabolic Analyzer 1639         Future Appointments    Date/Time Provider Specialty Site   04/20/2017 09:45 AM Terence [facility]   04/20/2017 10:00 AM CLEO Boone   Bariatric Medicine [facility]     Signatures   Electronically signed by : Hair Mccarty, ; Mar  2 2017 12:32PM EST                       (Author)    Electronically signed by : CLEO Browning ; Mar  2 2017  5:21PM EST                       (Co-author)

## 2018-01-16 NOTE — MISCELLANEOUS
Provider Comments  Provider Comments:   Dear Yan Chi     We called you about your scheduled appointment for 10/10/2016 today but were unable to reach you  It is very important that you follow up with us so that we can assess your physical and nutritional safety  Please call our office at [phone] to reschedule your appointment       Sincerely,     [facility]            Signatures   Electronically signed by : Justino Gomes, ; Oct 10 2016  3:02PM EST                       (Author)

## 2018-01-17 NOTE — PROGRESS NOTES
History of Present Illness  HPI: Katlin Moreno presented for her 2 month follow-up visit  She has gained 0 7# in the past 2 weeks and has had an overall weight loss of 7 6# ( 3 6 % TBW) in the past 2 months  Went on vacation in August - attempted to stay on track - had some weight gain gain  She stated work is challenging with food related celebrations  She has been consuming more regular high calric food than product  not food journaling  Trying to go to gym 1 x week  Bariatric MNT M[facility]:   Weight Assessment: IBW: 110#, Goal Weight: # LTG: 160#  Excess calories come from in between meal snacking, large portions at mealtimes and high calorie high fat food choices  Dietary Recall:   Breakfast: Shake  Banana PB  Snack: fruit / cheese  Lunch: Shake  Snack: meal - salad - more parties at work -trying to stay small  Dinner: yogurt   PB apple  bar  Beverages: water  Estimated fluid intake: >64oz  She dines out 1-2 times per week  Exercise Frequency:  She exercises Met with Bev at gym and plans   Her obesity/being overweight is related to excessive energy intake and as evidenced by BMI: 35 8  Nutrition Prescription: Estimated calories for weight loss: Tanita 1656 x 1 3-1000 = 1153 Ecal BMR: 1527 weight loss range: 832-1100 calories, Estimated daily protein needs: 60-75gm ( 1 2-1 5 gm/kg IBW) and Estimated daily fluid needs: 58oz ( 35ml/kg IBW)  Nutrition Intervention: Counseling provided with emphasis on meal planning, portion sizes, healthy snack choices, hydration, fiber intake, protein intake, exercise and food journaling  Education materials provided: [employer] manual, calorie controlled menus, low carbohydrate meal planning, low carb, high protein snack choices, high fiber, label reading tips, lean protein food choices and food journal tips  Comprehension: good  Expected Compliance: good     Goals: follow meal plan prescribed, reduce portion sizes at mealtimes, plan meals and snacks daily, Choose lower-calorie, lower-fat meal options at home and when dining out, food journal, do not skip meals or snacks, Limit carbohydrate intake  and 1200 calories using 2 meal replacments and 1 bar daily  Monitor/Evaluation: weekly weight, food journal, fluid intake and exercise level  Active Problems    1  Acute sinusitis (461 9) (J01 90)   2  Acute upper respiratory infection (465 9) (J06 9)   3  Adult BMI > 30 (V85 30) (E66 8)   4  Blood tests for routine general physical examination (V72 62) (Z00 00)   5  Depression, major, recurrent (296 30) (F33 9)   6  Eczema (692 9) (L30 9)   7  Encounter for gynecological examination with Papanicolaou smear of cervix   (V72 31,V76 2) (Z01 419,Z12 4)   8  Encounter for screening mammogram for malignant neoplasm of breast (V76 12)   (Z12 31)   9  Enlarged uterus (621 2) (N85 2)   10  History of allergy (V15 09) (Z88 9)   11  Influenza (487 1) (J11 1)   12  Irregular menses (626 4) (N92 6)   13  Spider vein, symptomatic (448 1) (I78 1)   14  Vertigo (780 4) (R42)   15  Weight gain (783 1) (R63 5)    Surgical History    1  History of Oral Surgery    Family History  Mother    1  Family history of Hypertension (V17 49)  Father    2  Family history of Cirrhosis   3  Family history of Leukemia (V16 6)  Maternal Grandmother    4  Family history of Diabetes Mellitus (V18 0)  Paternal Grandmother    11  Family history of Anxiety and depression  Paternal Grandfather    6  Family history of Acute Myocardial Infarction (V17 3)    Social History    · Being A Social Drinker   · College student   · Daily Coffee Consumption (3  Cups/Day)   · Employed   · Household: Mother   ·    · Never A Smoker    Current Meds   1  Multivitamins TABS; Therapy: (Recorded:14Mar2016) to Recorded   2  Tylenol TABS; Therapy: (Recorded:14Mar2016) to Recorded    Allergies    1   No Known Drug Allergies    Vitals  Signs   Recorded: 08Sep2016 11:19AM   Height: 5 ft 3 in  Weight: 202 lb 6 4 oz  BMI Calculated: 35 85  BSA Calculated: 1 94    Future Appointments    Date/Time Provider Specialty Site   10/10/2016 02:45 PM CLEO Koch   Bariatric Medicine [facility]     Signatures   Electronically signed by : Rianna Obrien, ; Sep  8 2016 11:29AM EST                       (Author)    Electronically signed by : CLEO Marie ; Sep  8 2016 12:08PM EST                       (Co-author)

## 2018-01-18 NOTE — PROGRESS NOTES
History of Present Illness  HPI: [facility] presented for her 5 month follow-up visit  She has lost gained 2 1# in the past 2 weeks and has had an overall weight loss of 2 4# ( 1 1 % TBW) in the past 5 months  She stated she has been struggling with stress eating the last 2 weeks  When home- portions are larger for meals and snacks  Lots of holiday parties and dining out  Did not fill the Belviq prescription  She has not been food journaling or exercising  Tried to identify a few small changes she could make the next 2-3 weeks  She stated she could try to manually food journal her food  She plans to fill her prescription for Belviq  Discussed using the shakes and bars as alternate meals when she becomes really busy with work     Bariatric MNT [address]:   Weight Assessment: IBW: 110#, Goal Weight: # LTG: 160#  Excess calories come from in between meal snacking, large portions at mealtimes and high calorie high fat food choices  Dietary Recall:   Beverages: water  Estimated fluid intake: <64oz  She dines out 2-3 times per week  Exercise Frequency:  She does not exercise  Her obesity/being overweight is related to excessive energy intake and as evidenced by BMI: 37 3  Nutrition Prescription: Estimated calories for weight loss: Ecal BMR: 1543 weight loss: 852-1122 calories, Estimated daily protein needs: 60-75gm ( 1 2-1 5 gm/kg IBW) and Estimated daily fluid needs: 58oz (35ml/kg IBW)  Nutrition Intervention: Counseling provided with emphasis on meal planning, portion sizes, healthy snack choices, hydration, fiber intake, protein intake, exercise and food journaling  Education materials provided: [employer] manual    Comprehension: good  Expected Compliance: good     Goals: follow meal plan prescribed, reduce portion sizes at mealtimes, plan meals and snacks daily, Choose lower-calorie, lower-fat meal options at home and when dining out, food journal, do not skip meals or snacks and 1200 calorie using 2 shakes and 1 bar daily   Monitor/Evaluation: weekly weight, food journal, fluid intake and exercise level  Active Problems    1  Adult BMI > 30 (V85 30) (E66 8)   2  Depression, major, recurrent (296 30) (F33 9)   3  Eczema (692 9) (L30 9)   4  Enlarged uterus (621 2) (N85 2)   5  History of allergy (V15 09) (Z88 9)   6  Irregular menses (626 4) (N92 6)   7  Spider vein, symptomatic (448 1) (I78 1)   8  Vertigo (780 4) (R42)   9  Weight gain (783 1) (R63 5)    Past Medical History    1  History of Acute upper respiratory infection (465 9) (J06 9)   2  History of Blood tests for routine general physical examination (V72 62) (Z00 00)   3  History of Encounter for gynecological examination with Papanicolaou smear of cervix   (V72 31,V76 2) (Z01 419,Z12 4)   4  History of Encounter for screening mammogram for malignant neoplasm of breast   (V76 12) (Z12 31)   5  History of acute sinusitis (V12 69) (Z87 09)   6  History of influenza (V12 09) (Z87 09)    Surgical History    1  History of Oral Surgery    Family History  Mother    1  Family history of Hypertension (V17 49)  Father    2  Family history of Cirrhosis   3  Family history of Leukemia (V16 6)  Maternal Grandmother    4  Family history of Diabetes Mellitus (V18 0)  Paternal Grandmother    11  Family history of Anxiety and depression  Paternal Grandfather    6  Family history of Acute Myocardial Infarction (V17 3)    Social History    · Being A Social Drinker   · College student   · Daily Coffee Consumption (3  Cups/Day)   · Employed   · Household: Mother   ·    · Never A Smoker    Current Meds   1  Belviq XR 20 MG Oral Tablet Extended Release 24 Hour; Take 1 tablet daily; Therapy: [id number] to (Evaluate:27Jan2017); Last Rx:28Nov2016 Ordered    Allergies    1   No Known Drug Allergies    Vitals  Signs   Recorded: 12Dec2016 03:18PM   Height: 5 ft 2 5 in  Weight: 207 lb 9 6 oz  BMI Calculated: 37 37  BSA Calculated: 1 95    Future Appointments    Date/Time Provider Specialty Site   01/05/2017 10:30 AM [facility]   01/19/2017 11:30 AM CLEO Ochoa   Bariatric Medicine [facility]     Signatures   Electronically signed by : Marcia Salcedo, ; Dec 12 2016  4:38PM EST                       (Author)    Electronically signed by : CLEO Medina ; Dec 14 2016  2:11PM EST                       (Co-author)

## 2018-01-18 NOTE — RESULT NOTES
Verified Results  (1) CBC/PLT/DIFF [id number] 07:51PM Ramona Giselle     Test Name Result Flag Reference   WBC COUNT 6 74 Thousand/uL  4 31-10 16   RBC COUNT 4 36 Million/uL  3 81-5 12   HEMOGLOBIN 13 3 g/dL  11 5-15 4   HEMATOCRIT 39 1 %  34 8-46  1   MCV 90 fL  82-98   MCH 30 5 pg  26 8-34 3   MCHC 34 0 g/dL  31 4-37 4   RDW 12 6 %  11 6-15 1   MPV 11 1 fL  8 9-12 7   PLATELET COUNT 004 Thousands/uL  149-390   nRBC AUTOMATED 0 /100 WBCs     NEUTROPHILS RELATIVE PERCENT 58 %  43-75   LYMPHOCYTES RELATIVE PERCENT 35 %  14-44   MONOCYTES RELATIVE PERCENT 6 %  4-12   EOSINOPHILS RELATIVE PERCENT 1 %  0-6   BASOPHILS RELATIVE PERCENT 0 %  0-1   NEUTROPHILS ABSOLUTE COUNT 3 89 Thousands/?L  1 85-7 62   LYMPHOCYTES ABSOLUTE COUNT 2 36 Thousands/?L  0 60-4 47   MONOCYTES ABSOLUTE COUNT 0 40 Thousand/?L  0 17-1 22   EOSINOPHILS ABSOLUTE COUNT 0 06 Thousand/?L  0 00-0 61   BASOPHILS ABSOLUTE COUNT 0 02 Thousands/?L  0 00-0 10     (1) VITAMIN D 25-HYDROXY [id number] 07:51PM Ramona Giselle     Test Name Result Flag Reference   VIT D 25-HYDROX 13 3 ng/mL L 30 0-100 0     (1) URINALYSIS w URINE C/S REFLEX (will reflex a microscopy if leukocytes, occult blood, or nitrites are not within normal limits) [id number] 07:51PM Ramona Giselle     Test Name Result Flag Reference   COLOR Yellow     CLARITY Turbid     PH UA 5 0  4 5-8 0   LEUKOCYTE ESTERASE UA Moderate A Negative   NITRITE UA Negative  Negative   PROTEIN UA Negative mg/dl  Negative   GLUCOSE UA Negative mg/dl  Negative   KETONES UA Negative mg/dl  Negative   UROBILINOGEN UA 0 2 E U /dl  0 2, 1 0 E U /dl   BILIRUBIN UA Negative  Negative   BLOOD UA Small A Negative   SPECIFIC GRAVITY UA 1 027  1 003-1 030     (1) URINALYSIS w URINE C/S REFLEX (will reflex a microscopy if leukocytes, occult blood, or nitrites are not within normal limits) [id number] 07:51PM Ramona Giselle     Test Name Result Flag Reference   BACTERIA Moderate /hpf A None Seen, Occasional   CALCIUM OXALATE CRYSTALS /HPF IN URINE SEDIMENT BY MICROSCOPY Occasional /hpf A None Seen   EPITHELIAL CELLS Moderate /hpf A None Seen, Occasional   RBC UA 2-4 /hpf A None Seen   WBC UA 20-30 /hpf A None Seen     (1) HEMOGLOBIN A1C [id number] 07:51PM [facility]     Test Name Result Flag Reference   HEMOGLOBIN A1C 5 1 %  4 0-5 6   EST  AVG  GLUCOSE 100 mg/dl     5 7-6 4% impaired fasting glucose  >=6 5% diagnosis of diabetes    Falsely low levels are seen in conditions linked to short RBC life span-  hemolytic anemia, and splenomegaly  Falsely elevated levels are seen in situations where there is an increased production of RBC- receipt of erythropoietin or blood transfusions  Adopted from ADA-Clinical Practice Recommendations     (1) URINALYSIS w URINE C/S REFLEX (will reflex a microscopy if leukocytes, occult blood, or nitrites are not within normal limits) [id number] 07:51PM [facility]     Test Name Result Flag Reference   CLINICAL REPORT (Report)     Test:        Urine culture  Specimen Type:   Urine  Specimen Date:   5/5/2016 7:51 PM  Result Date:    5/6/2016 5:13 PM  Result Status:   Final result  Resulting Lab:   [address]            Tel: [phone]      CULTURE                                       ------------------                                   [id number] cfu/ml Mixed Contaminants X2     (1) COMPREHENSIVE METABOLIC PANEL [id number] 40:68SS [facility]     Test Name Result Flag Reference   GLUCOSE,RANDM 102 mg/dL     If the patient is fasting, the ADA then defines impaired fasting glucose as > 100 mg/dL and diabetes as > or equal to 123 mg/dL     SODIUM 141 mmol/L  136-145   POTASSIUM 4 3 mmol/L  3 5-5 3   CHLORIDE 107 mmol/L  100-108   CARBON DIOXIDE 26 mmol/L  21-32   ANION GAP (CALC) 8 mmol/L  4-13   BLOOD UREA NITROGEN 12 mg/dL  5-25   CREATININE 0 78 mg/dL  0 60-1 30   Standardized to IDMS reference method   CALCIUM 8 3 mg/dL  8 3-10 1   BILI, TOTAL 0 35 mg/dL  0 20-1 00   ALK PHOSPHATAS 77 U/L     ALT (SGPT) 24 U/L  12-78   AST(SGOT) 10 U/L  5-45   ALBUMIN 3 7 g/dL  3 5-5 0   TOTAL PROTEIN 6 9 g/dL  6 4-8 2   eGFR Non-African American      >60 0 ml/min/1 73sq [facility] Disease Education Program recommendations are as follows:  GFR calculation is accurate only with a steady state creatinine  Chronic Kidney disease less than 60 ml/min/1 73 sq  meters  Kidney failure less than 15 ml/min/1 73 sq  meters  (1) LIPID PANEL, FASTING [id number] 07:50PM Tobie Peabody     Test Name Result Flag Reference   CHOLESTEROL 115 mg/dL     HDL,DIRECT 42 mg/dL  40-60   Specimen collection should occur prior to Metamizole administration due to the potential for falsely depressed results  LDL CHOLESTEROL CALCULATED 60 mg/dL  0-100   Triglyceride:         Normal              <150 mg/dl       Borderline High    150-199 mg/dl       High               200-499 mg/dl       Very High          >499 mg/dl  Cholesterol:         Desirable        <200 mg/dl      Borderline High  200-239 mg/dl      High             >239 mg/dl  HDL Cholesterol:        High    >59 mg/dL      Low     <41 mg/dL  LDL CALCULATED:    This screening LDL is a calculated result  It does not have the accuracy of the Direct Measured LDL in the monitoring of patients with hyperlipidemia and/or statin therapy  Direct Measure LDL ([id number]) must be ordered separately in these patients  TRIGLYCERIDES 63 mg/dL  <=150   Specimen collection should occur prior to N-Acetylcysteine or Metamizole administration due to the potential for falsely depressed results  (1) TSH WITH FT4 REFLEX [id number] 07:50PM Tobie Peabody     Test Name Result Flag Reference   TSH 3 250 uIU/mL  0 358-3 740   Patients undergoing fluorescein dye angiography may retain small amounts of fluorescein in the body for 48-72 hours post procedure   Samples containing fluorescein can produce falsely depressed TSH values  If the patient had this procedure,a specimen should be resubmitted post fluorescein clearance  The recommended reference ranges for TSH during pregnancy are as follows:  First trimester 0 1 to 2 5 uIU/mL  Second trimester  0 2 to 3 0 uIU/mL  Third trimester 0 3 to 3 0 uIU/m       Discussion/Summary   Labs ok, except Vit D decreased  Add Vit D 2000 IU daily

## 2018-01-22 VITALS — WEIGHT: 205.4 LBS | BODY MASS INDEX: 36.39 KG/M2 | HEIGHT: 63 IN

## 2018-01-22 VITALS
WEIGHT: 204.6 LBS | HEIGHT: 63 IN | BODY MASS INDEX: 36.25 KG/M2 | DIASTOLIC BLOOD PRESSURE: 64 MMHG | HEART RATE: 84 BPM | TEMPERATURE: 98.4 F | SYSTOLIC BLOOD PRESSURE: 104 MMHG

## 2018-01-22 VITALS
TEMPERATURE: 98.2 F | DIASTOLIC BLOOD PRESSURE: 80 MMHG | SYSTOLIC BLOOD PRESSURE: 118 MMHG | WEIGHT: 214 LBS | BODY MASS INDEX: 37.92 KG/M2 | RESPIRATION RATE: 16 BRPM | HEART RATE: 84 BPM | HEIGHT: 63 IN

## 2018-01-22 VITALS — BODY MASS INDEX: 36.07 KG/M2 | HEIGHT: 63 IN | WEIGHT: 203.6 LBS

## 2018-01-22 VITALS
HEART RATE: 78 BPM | SYSTOLIC BLOOD PRESSURE: 112 MMHG | HEIGHT: 62 IN | DIASTOLIC BLOOD PRESSURE: 80 MMHG | BODY MASS INDEX: 37.41 KG/M2 | WEIGHT: 203.3 LBS | TEMPERATURE: 98.8 F

## 2018-07-28 PROBLEM — L30.9 HAND DERMATITIS: Status: ACTIVE | Noted: 2017-07-28

## 2018-07-28 PROBLEM — R53.83 FATIGUE: Status: ACTIVE | Noted: 2017-07-28

## 2018-07-28 PROBLEM — E66.01 SEVERE OBESITY (BMI 35.0-35.9 WITH COMORBIDITY) (HCC): Status: ACTIVE | Noted: 2017-07-26

## 2018-07-28 PROBLEM — I83.90 VARICOSE VEINS: Status: ACTIVE | Noted: 2017-07-28

## 2018-07-28 NOTE — PROGRESS NOTES
[facility]  FAMILY PRACTICE OFFICE VISIT       NAME: Roseanne Mendosa  AGE: 50 y o  SEX: female       : 1970        MRN: [id number]    DATE: 2018  TIME: 8:31 PM    Assessment and Plan     Problem List Items Addressed This Visit     Fatigue      Check labs as ordered today but also recommended sleep study to assess for sleep apnea  Relevant Orders    Vitamin D 25 hydroxy    Home Study    Severe obesity (BMI 35 0-35 9 with comorbidity) ([facility] [place] [address] ) - Primary       Just joined weight [employer] yesterday  Encouraged to work on this for the next few months until returns for her physical   We will reassess at her next visit  Relevant Orders    CBC and differential    Comprehensive metabolic panel    TSH, 3rd generation with Free T4 reflex    Lipid Panel with Direct LDL reflex    Hemoglobin A1C    Weight gain       Patient just joined weight watchers yesterday  She will work on getting back on track with her diet and exercise  Arthralgia       Check labs including repeat Lyme test for causes of arthralgias  Should try to work on weight as this could be contributing to her extremity joint pains  We will reassess next visit in about 2 months when she returns for a physical           Relevant Orders    HITESH Screen w/ Reflex to Titer/Pattern    C-reactive protein    RF Screen w/ Reflex to Titer    Sedimentation rate, automated    Lyme disease, western blot      Other Visit Diagnoses     Diabetes mellitus screening        Relevant Orders    Comprehensive metabolic panel    Hemoglobin A1C    Lipid screening        Relevant Orders    Lipid Panel with Direct LDL reflex          Arthralgia    Check labs including repeat Lyme test for causes of arthralgias  Should try to work on weight as this could be contributing to her extremity joint pains    We will reassess next visit in about 2 months when she returns for a physical      Fatigue   Check labs as ordered today but also recommended sleep study to assess for sleep apnea  Severe obesity (BMI 35 0-35 9 with comorbidity) ([facility] [address] )    Just joined weight [email or web address] yesterday  Encouraged to work on this for the next few months until returns for her physical   We will reassess at her next visit  Weight gain    Patient just joined weight watchers yesterday  She will work on getting back on track with her diet and exercise  Chief Complaint     Chief Complaint   Patient presents with    Joint Pain    Weight Gain    Fatigue       History of Present Illness   Karmen Contreras is a 50y o -year-old female who presents for completion of employee wellness program requirements (not due for physical until after 10/09/2018)       Notes that she has a hip, shoulders, fingers, hips, and back pains over the last few months - notes that she has the pain worse at night - feels okay over the day - has trouble with waking from the pain = notes that she wakes up stiff - notes that she is very tired - wants to do more but cannot - struggles to get up in the morning for an hour - notes that she has times where big activities take her days to recuperate from - thinks from being "older" - notes that she finds it makes her drink more coffee and thus has more creamer - notes that she had been working on diet and exercising regularly and got down to 185 pounds - notes that she just bought weight watchers yesterday - notes that at home she was 185 in April and then in July about 2 weeks ago was 205 - notes that she does not have red, hot, swollen joints, but mom has RA - no other autoimmune - averages about 8-9 hours of sleep a night on worknight and 11-12 on the weekends - never feels rested - feels that she wakes from pain or dog stealing bed - notes that her dog was positive for Lyme and she was concerned about it for herself (last year negative)          Review of Systems   Review of Systems   Constitutional: Positive for fatigue and unexpected weight change  Musculoskeletal: Positive for arthralgias  Negative for joint swelling  Skin: Negative for color change  Psychiatric/Behavioral: Positive for sleep disturbance (attributes to pain level)         Active Problem List     Patient Active Problem List   Diagnosis    Depression, major, recurrent ([facility] )    Eczema    Enlarged uterus    Fatigue    Hand dermatitis    Irregular menses    Severe obesity (BMI 35 0-35 9 with comorbidity) ([facility] )    Spider vein, symptomatic    Varicose veins    Vertigo    Weight gain    Arthralgia         Past Medical History:  Past Medical History:   Diagnosis Date    No known health problems        Past Surgical History:  Past Surgical History:   Procedure Laterality Date    MOUTH SURGERY      ROOT CANAL      Apicoectomy; resolved; 07/26/17    WISDOM TOOTH EXTRACTION      1980's       Family History:  Family History   Problem Relation Age of Onset    Hypertension Mother     Rheum arthritis Mother     Cirrhosis Father     Leukemia Father         ALL    Diabetes Maternal Grandmother         mellitus    Hypertension Maternal Grandmother         pulmonary    Cancer Maternal Grandfather         face/throat    Anxiety disorder Paternal Grandmother     Depression Paternal Grandmother     Dementia Paternal Grandmother     Glaucoma Paternal Grandmother     Hypertension Paternal Grandmother     Lung cancer Paternal Grandfather     Heart attack Paternal Grandfather        Social History:  Social History     Social History    Marital status: /Civil Union     Spouse name: N/A    Number of children: N/A    Years of education: college student     Occupational History    employed      Social History Main Topics    Smoking status: Never Smoker    Smokeless tobacco: Never Used    Alcohol use Yes      Comment: occassional    Drug use: No    Sexual activity: Not on file     Other Topics Concern    Not on file     Social History Narrative    Daily coffee consumption 2 cups a day    Household: Mother           Objective     Vitals:    08/02/18 1424   BP: 98/72   Pulse: 78   Temp: 99 1 °F (37 3 °C)   SpO2: 98%     Wt Readings from Last 3 Encounters:   08/02/18 94 5 kg (208 lb 4 oz)   11/07/17 92 2 kg (203 lb 4 8 oz)   10/09/17 93 9 kg (207 lb)       Physical Exam   Constitutional: She appears well-developed and well-nourished  No distress  Obese   Neck: Neck supple  No thyromegaly present  Cardiovascular: Normal rate, regular rhythm, normal heart sounds and intact distal pulses  No murmur heard  Pulmonary/Chest: Effort normal and breath sounds normal  She has no wheezes  She has no rales  Musculoskeletal: She exhibits tenderness (over the lumbar spine)  She exhibits no edema  Lymphadenopathy:     She has no cervical adenopathy  Neurological: She has normal strength  No sensory deficit  Skin: Skin is warm and dry  Psychiatric: She has a normal mood and affect  Her behavior is normal    Vitals reviewed        Pertinent Laboratory/Diagnostic Studies:  Lab Results   Component Value Date    GLUCOSE 102 05/05/2016    BUN 10 07/28/2017    CREATININE 0 73 07/28/2017    CALCIUM 8 9 07/28/2017     07/28/2017    K 4 2 07/28/2017    CO2 26 07/28/2017     07/28/2017     Lab Results   Component Value Date    ALT 16 07/28/2017    AST 10 07/28/2017    ALKPHOS 72 07/28/2017    BILITOT 0 41 07/28/2017       Lab Results   Component Value Date    WBC 6 04 07/28/2017    HGB 13 3 07/28/2017    HCT 38 5 07/28/2017    MCV 86 07/28/2017     07/28/2017     Lab Results   Component Value Date    CHOL 133 07/19/2017     Lab Results   Component Value Date    TRIG 91 07/19/2017     Lab Results   Component Value Date    HDL 52 07/19/2017     Lab Results   Component Value Date    LDLCALC 63 07/19/2017     Lab Results   Component Value Date    HGBA1C 5 6 07/19/2017       Results for orders placed or performed in visit on 07/28/17   CBC and differential   Result Value Ref Range    WBC 6 04 4 31 - 10 16 Thousand/uL    RBC 4 48 3 81 - 5 12 Million/uL    Hemoglobin 13 3 11 5 - 15 4 g/dL    Hematocrit 38 5 34 8 - 46 1 %    MCV 86 82 - 98 fL    MCH 29 7 26 8 - 34 3 pg    MCHC 34 5 31 4 - 37 4 g/dL    RDW 12 9 11 6 - 15 1 %    MPV 10 5 8 9 - 12 7 fL    Platelets 012 876 - 532 Thousands/uL    nRBC 0 /100 WBCs    Neutrophils Relative 64 43 - 75 %    Lymphocytes Relative 28 14 - 44 %    Monocytes Relative 7 4 - 12 %    Eosinophils Relative 1 0 - 6 %    Basophils Relative 0 0 - 1 %    Neutrophils Absolute 3 93 1 85 - 7 62 Thousands/µL    Lymphocytes Absolute 1 66 0 60 - 4 47 Thousands/µL    Monocytes Absolute 0 40 0 17 - 1 22 Thousand/µL    Eosinophils Absolute 0 04 0 00 - 0 61 Thousand/µL    Basophils Absolute 0 01 0 00 - 0 10 Thousands/µL   Comprehensive metabolic panel   Result Value Ref Range    Sodium 138 136 - 145 mmol/L    Potassium 4 2 3 5 - 5 3 mmol/L    Chloride 103 100 - 108 mmol/L    CO2 26 21 - 32 mmol/L    Anion Gap 9 4 - 13 mmol/L    BUN 10 5 - 25 mg/dL    Creatinine 0 73 0 60 - 1 30 mg/dL    Glucose, Fasting 84 65 - 99 mg/dL    Calcium 8 9 8 3 - 10 1 mg/dL    AST 10 5 - 45 U/L    ALT 16 12 - 78 U/L    Alkaline Phosphatase 72 46 - 116 U/L    Total Protein 7 3 6 4 - 8 2 g/dL    Albumin 3 7 3 5 - 5 0 g/dL    Total Bilirubin 0 41 0 20 - 1 00 mg/dL    eGFR 98 ml/min/1 73sq m   TSH, 3rd generation with T4 reflex   Result Value Ref Range    TSH 3RD GENERATON 3 280 0 358 - 3 740 uIU/mL   Lyme disease, western blot   Result Value Ref Range    Lyme 18 kD IgG Absent     Lyme 23 kD IgG Absent     Lyme 28 kD IgG Absent     Lyme 30 kD IgG Absent     Lyme 39 kD IgG Present (A)     Lyme 41 kD IgG Present (A)     Lyme 45 kD IgG Absent     Lyme 58 kD IgG Absent     Lyme 66 kD IgG Absent     Lyme 93 kD IgG Absent     Lyme 23 kD IgM Absent     Lyme 39 kD IgM Absent     Lyme 41 kD IgM Absent     Lyme IgG WB Interp  Negative     Lyme IgM WB Interp   Negative        Orders Placed This Encounter   Procedures    HITESH Screen w/ Reflex to Titer/Pattern    C-reactive protein    RF Screen w/ Reflex to Titer    Sedimentation rate, automated    CBC and differential    Comprehensive metabolic panel    TSH, 3rd generation with Free T4 reflex    Lipid Panel with Direct LDL reflex    Hemoglobin A1C    Vitamin D 25 hydroxy    Lyme disease, western blot    Home Study       ALLERGIES:  No Known Allergies    Current Medications     Current Outpatient Prescriptions   Medication Sig Dispense Refill    Multiple Vitamin (MULTIVITAMIN) tablet Take 1 tablet by mouth daily      Nutritional Supplements (VITAMIN D MAINTENANCE PO) Take by mouth daily       No current facility-administered medications for this visit            Health Maintenance     Health Maintenance   Topic Date Due    MAMMOGRAM  08/16/2018    INFLUENZA VACCINE  09/01/2018    HIV SCREENING  02/04/2020    DTaP,Tdap,and Td Vaccines (3 - Td) 10/09/2027     Immunization History   Administered Date(s) Administered    Hep B, adult 07/24/2013, 08/26/2013, 03/12/2014    Influenza 01/01/2007    Influenza Quadrivalent Preservative Free 3 years and older IM 10/03/2017    Influenza TIV (IM) 10/24/2009, 11/01/2015    Meningococcal, Unknown Serogroups 03/19/2014    Tdap 09/26/2007, 10/09/2017    Tuberculin Skin Test-PPD Intradermal 07/29/2013, 08/12/2013, 03/21/2014       Ashli Medina PA-C  8/2/2018 8:31 PM  [facility]

## 2018-08-02 ENCOUNTER — OFFICE VISIT (OUTPATIENT)
Dept: FAMILY MEDICINE CLINIC | Facility: CLINIC | Age: 48
End: 2018-08-02
Payer: COMMERCIAL

## 2018-08-02 VITALS
OXYGEN SATURATION: 98 % | HEIGHT: 62 IN | SYSTOLIC BLOOD PRESSURE: 98 MMHG | TEMPERATURE: 99.1 F | DIASTOLIC BLOOD PRESSURE: 72 MMHG | WEIGHT: 208.25 LBS | HEART RATE: 78 BPM | BODY MASS INDEX: 38.32 KG/M2

## 2018-08-02 DIAGNOSIS — Z13.220 LIPID SCREENING: ICD-10-CM

## 2018-08-02 DIAGNOSIS — R53.83 FATIGUE, UNSPECIFIED TYPE: ICD-10-CM

## 2018-08-02 DIAGNOSIS — M25.50 ARTHRALGIA, UNSPECIFIED JOINT: ICD-10-CM

## 2018-08-02 DIAGNOSIS — Z13.1 DIABETES MELLITUS SCREENING: ICD-10-CM

## 2018-08-02 DIAGNOSIS — R63.5 WEIGHT GAIN: ICD-10-CM

## 2018-08-02 DIAGNOSIS — E66.01 SEVERE OBESITY (BMI 35.0-35.9 WITH COMORBIDITY) (HCC): Primary | ICD-10-CM

## 2018-08-02 PROCEDURE — 3008F BODY MASS INDEX DOCD: CPT | Performed by: PHYSICIAN ASSISTANT

## 2018-08-02 PROCEDURE — 99213 OFFICE O/P EST LOW 20 MIN: CPT | Performed by: PHYSICIAN ASSISTANT

## 2018-08-03 ENCOUNTER — TRANSCRIBE ORDERS (OUTPATIENT)
Dept: ADMINISTRATIVE | Facility: HOSPITAL | Age: 48
End: 2018-08-03

## 2018-08-03 ENCOUNTER — APPOINTMENT (OUTPATIENT)
Dept: LAB | Facility: MEDICAL CENTER | Age: 48
End: 2018-08-03
Payer: COMMERCIAL

## 2018-08-03 DIAGNOSIS — R53.83 FATIGUE DUE TO DEPRESSION: ICD-10-CM

## 2018-08-03 DIAGNOSIS — F32.A FATIGUE DUE TO DEPRESSION: ICD-10-CM

## 2018-08-03 DIAGNOSIS — R53.83 FATIGUE, UNSPECIFIED TYPE: ICD-10-CM

## 2018-08-03 DIAGNOSIS — Z12.39 SCREENING FOR MALIGNANT NEOPLASM OF BREAST: Primary | ICD-10-CM

## 2018-08-03 DIAGNOSIS — M25.50 ARTHRALGIA, UNSPECIFIED JOINT: ICD-10-CM

## 2018-08-03 DIAGNOSIS — Z00.8 HEALTH EXAMINATION IN POPULATION SURVEY: Primary | ICD-10-CM

## 2018-08-03 DIAGNOSIS — Z00.8 HEALTH EXAMINATION IN POPULATION SURVEY: ICD-10-CM

## 2018-08-03 LAB
25(OH)D3 SERPL-MCNC: 19.2 NG/ML (ref 30–100)
ALBUMIN SERPL BCP-MCNC: 3.6 G/DL (ref 3.5–5)
ALP SERPL-CCNC: 68 U/L (ref 46–116)
ALT SERPL W P-5'-P-CCNC: 17 U/L (ref 12–78)
ANION GAP SERPL CALCULATED.3IONS-SCNC: 6 MMOL/L (ref 4–13)
AST SERPL W P-5'-P-CCNC: 10 U/L (ref 5–45)
BASOPHILS # BLD AUTO: 0.02 THOUSANDS/ΜL (ref 0–0.1)
BASOPHILS NFR BLD AUTO: 0 % (ref 0–1)
BILIRUB SERPL-MCNC: 0.35 MG/DL (ref 0.2–1)
BUN SERPL-MCNC: 13 MG/DL (ref 5–25)
CALCIUM SERPL-MCNC: 8.7 MG/DL (ref 8.3–10.1)
CHLORIDE SERPL-SCNC: 103 MMOL/L (ref 100–108)
CHOLEST SERPL-MCNC: 124 MG/DL (ref 50–200)
CO2 SERPL-SCNC: 28 MMOL/L (ref 21–32)
CREAT SERPL-MCNC: 0.76 MG/DL (ref 0.6–1.3)
CRP SERPL QL: <3 MG/L
EOSINOPHIL # BLD AUTO: 0.05 THOUSAND/ΜL (ref 0–0.61)
EOSINOPHIL NFR BLD AUTO: 1 % (ref 0–6)
ERYTHROCYTE [DISTWIDTH] IN BLOOD BY AUTOMATED COUNT: 12.9 % (ref 11.6–15.1)
ERYTHROCYTE [SEDIMENTATION RATE] IN BLOOD: 15 MM/HOUR (ref 0–20)
EST. AVERAGE GLUCOSE BLD GHB EST-MCNC: 103 MG/DL
GFR SERPL CREATININE-BSD FRML MDRD: 93 ML/MIN/1.73SQ M
GLUCOSE SERPL-MCNC: 89 MG/DL (ref 65–140)
HBA1C MFR BLD: 5.2 % (ref 4.2–6.3)
HCT VFR BLD AUTO: 39.6 % (ref 34.8–46.1)
HDLC SERPL-MCNC: 47 MG/DL (ref 40–60)
HGB BLD-MCNC: 13 G/DL (ref 11.5–15.4)
IMM GRANULOCYTES # BLD AUTO: 0.01 THOUSAND/UL (ref 0–0.2)
IMM GRANULOCYTES NFR BLD AUTO: 0 % (ref 0–2)
LDLC SERPL CALC-MCNC: 58 MG/DL (ref 0–100)
LYMPHOCYTES # BLD AUTO: 1.49 THOUSANDS/ΜL (ref 0.6–4.47)
LYMPHOCYTES NFR BLD AUTO: 22 % (ref 14–44)
MCH RBC QN AUTO: 29.5 PG (ref 26.8–34.3)
MCHC RBC AUTO-ENTMCNC: 32.8 G/DL (ref 31.4–37.4)
MCV RBC AUTO: 90 FL (ref 82–98)
MONOCYTES # BLD AUTO: 0.45 THOUSAND/ΜL (ref 0.17–1.22)
MONOCYTES NFR BLD AUTO: 7 % (ref 4–12)
NEUTROPHILS # BLD AUTO: 4.67 THOUSANDS/ΜL (ref 1.85–7.62)
NEUTS SEG NFR BLD AUTO: 70 % (ref 43–75)
NONHDLC SERPL-MCNC: 77 MG/DL
NRBC BLD AUTO-RTO: 0 /100 WBCS
PLATELET # BLD AUTO: 205 THOUSANDS/UL (ref 149–390)
PMV BLD AUTO: 10.5 FL (ref 8.9–12.7)
POTASSIUM SERPL-SCNC: 4.1 MMOL/L (ref 3.5–5.3)
PROT SERPL-MCNC: 7.1 G/DL (ref 6.4–8.2)
RBC # BLD AUTO: 4.4 MILLION/UL (ref 3.81–5.12)
SODIUM SERPL-SCNC: 137 MMOL/L (ref 136–145)
T3FREE SERPL-MCNC: 2.27 PG/ML (ref 2.3–4.2)
T4 FREE SERPL-MCNC: 0.9 NG/DL (ref 0.76–1.46)
TRIGL SERPL-MCNC: 97 MG/DL
TSH SERPL DL<=0.05 MIU/L-ACNC: 2.85 UIU/ML (ref 0.36–3.74)
WBC # BLD AUTO: 6.69 THOUSAND/UL (ref 4.31–10.16)

## 2018-08-03 PROCEDURE — 36415 COLL VENOUS BLD VENIPUNCTURE: CPT | Performed by: PHYSICIAN ASSISTANT

## 2018-08-03 PROCEDURE — 85652 RBC SED RATE AUTOMATED: CPT

## 2018-08-03 PROCEDURE — 84443 ASSAY THYROID STIM HORMONE: CPT | Performed by: PHYSICIAN ASSISTANT

## 2018-08-03 PROCEDURE — 80061 LIPID PANEL: CPT

## 2018-08-03 PROCEDURE — 84481 FREE ASSAY (FT-3): CPT

## 2018-08-03 PROCEDURE — 86430 RHEUMATOID FACTOR TEST QUAL: CPT

## 2018-08-03 PROCEDURE — 85025 COMPLETE CBC W/AUTO DIFF WBC: CPT | Performed by: PHYSICIAN ASSISTANT

## 2018-08-03 PROCEDURE — 83036 HEMOGLOBIN GLYCOSYLATED A1C: CPT | Performed by: PHYSICIAN ASSISTANT

## 2018-08-03 PROCEDURE — 86140 C-REACTIVE PROTEIN: CPT

## 2018-08-03 PROCEDURE — 86617 LYME DISEASE ANTIBODY: CPT

## 2018-08-03 PROCEDURE — 86038 ANTINUCLEAR ANTIBODIES: CPT

## 2018-08-03 PROCEDURE — 84439 ASSAY OF FREE THYROXINE: CPT

## 2018-08-03 PROCEDURE — 84482 T3 REVERSE: CPT

## 2018-08-03 PROCEDURE — 82306 VITAMIN D 25 HYDROXY: CPT

## 2018-08-03 PROCEDURE — 80053 COMPREHEN METABOLIC PANEL: CPT | Performed by: PHYSICIAN ASSISTANT

## 2018-08-03 NOTE — ASSESSMENT & PLAN NOTE
Check labs including repeat Lyme test for causes of arthralgias  Should try to work on weight as this could be contributing to her extremity joint pains    We will reassess next visit in about 2 months when she returns for a physical

## 2018-08-03 NOTE — ASSESSMENT & PLAN NOTE
Patient just joined weight watchers yesterday  She will work on getting back on track with her diet and exercise

## 2018-08-03 NOTE — PATIENT INSTRUCTIONS
Arthralgia    Check labs including repeat Lyme test for causes of arthralgias  Should try to work on weight as this could be contributing to her extremity joint pains  We will reassess next visit in about 2 months when she returns for a physical      Fatigue   Check labs as ordered today but also recommended sleep study to assess for sleep apnea  Severe obesity (BMI 35 0-35 9 with comorbidity) ([facility] [address] )    Just joined weight [employer] yesterday  Encouraged to work on this for the next few months until returns for her physical   We will reassess at her next visit  Weight gain    Patient just joined weight [employer] yesterday  She will work on getting back on track with her diet and exercise

## 2018-08-03 NOTE — ASSESSMENT & PLAN NOTE
Just joined weight watchers yesterday  Encouraged to work on this for the next few months until returns for her physical   We will reassess at her next visit

## 2018-08-04 LAB
B BURGDOR IGG PATRN SER IB-IMP: NEGATIVE
B BURGDOR IGM PATRN SER IB-IMP: NEGATIVE
B BURGDOR18KD IGG SER QL IB: NORMAL
B BURGDOR23KD IGG SER QL IB: NORMAL
B BURGDOR23KD IGM SER QL IB: NORMAL
B BURGDOR28KD IGG SER QL IB: NORMAL
B BURGDOR30KD IGG SER QL IB: NORMAL
B BURGDOR39KD IGG SER QL IB: NORMAL
B BURGDOR39KD IGM SER QL IB: NORMAL
B BURGDOR41KD IGG SER QL IB: NORMAL
B BURGDOR41KD IGM SER QL IB: NORMAL
B BURGDOR45KD IGG SER QL IB: NORMAL
B BURGDOR58KD IGG SER QL IB: NORMAL
B BURGDOR66KD IGG SER QL IB: NORMAL
B BURGDOR93KD IGG SER QL IB: NORMAL

## 2018-08-06 DIAGNOSIS — R79.89 LOW VITAMIN D LEVEL: Primary | ICD-10-CM

## 2018-08-06 LAB
RHEUMATOID FACT SER QL LA: NEGATIVE
RYE IGE QN: NEGATIVE

## 2018-08-06 RX ORDER — ERGOCALCIFEROL (VITAMIN D2) 1250 MCG
50000 CAPSULE ORAL WEEKLY
Qty: 12 CAPSULE | Refills: 0 | Status: SHIPPED | OUTPATIENT
Start: 2018-08-06 | End: 2020-07-29 | Stop reason: ALTCHOICE

## 2018-08-06 NOTE — TELEPHONE ENCOUNTER
----- Message from Shasha Molina PA-C sent at 8/6/2018 11:32 AM EDT -----  Manuel Becerril  Please have her take ergocalciferol 53769 units once weekly times 12 weeks and then transition to vitamin-D 2000 units daily over-the-counter

## 2018-08-08 LAB — T3REVERSE SERPL-MCNC: 20.3 NG/DL (ref 9.2–24.1)

## 2018-08-09 ENCOUNTER — TELEPHONE (OUTPATIENT)
Dept: FAMILY MEDICINE CLINIC | Facility: CLINIC | Age: 48
End: 2018-08-09

## 2018-08-09 NOTE — TELEPHONE ENCOUNTER
Please let the patient know this is not something that we standardly recommend  From what I can find on information from the [employer] website, there is not a current recommended dose for this supplement; however, it appears that there are no adverse effects associated with its consumption and there is a low potential for toxicity

## 2018-08-09 NOTE — TELEPHONE ENCOUNTER
Pt picked up prescription, she would like to know if she can take Vitamin K2, she has a friend that told her that this is good vitamin for pt with low Vitamin D since she always has this problem  Also she want to let you know that her T3 testing was low (odered by another physician) so she will get a supplement, she will call us with an update on the name of this supplement

## 2018-09-04 ENCOUNTER — ANNUAL EXAM (OUTPATIENT)
Dept: OBGYN CLINIC | Facility: MEDICAL CENTER | Age: 48
End: 2018-09-04
Payer: COMMERCIAL

## 2018-09-04 VITALS — SYSTOLIC BLOOD PRESSURE: 110 MMHG | BODY MASS INDEX: 38.67 KG/M2 | WEIGHT: 211.4 LBS | DIASTOLIC BLOOD PRESSURE: 82 MMHG

## 2018-09-04 DIAGNOSIS — N85.2 BULKY OR ENLARGED UTERUS: ICD-10-CM

## 2018-09-04 DIAGNOSIS — Z12.31 ENCOUNTER FOR SCREENING MAMMOGRAM FOR MALIGNANT NEOPLASM OF BREAST: ICD-10-CM

## 2018-09-04 DIAGNOSIS — N92.6 IRREGULAR MENSES: ICD-10-CM

## 2018-09-04 DIAGNOSIS — Z01.419 ENCOUNTER FOR GYNECOLOGICAL EXAMINATION: Primary | ICD-10-CM

## 2018-09-04 PROCEDURE — 99396 PREV VISIT EST AGE 40-64: CPT | Performed by: OBSTETRICS & GYNECOLOGY

## 2018-09-04 NOTE — PROGRESS NOTES
ASSESSMENT & PLAN: Roseanne Mendosa is a 50 y o  [id number] with normal gynecologic exam     1   Routine well woman exam done today  2  Pap and HPV:  The patient's last pap and hpv was normal      Pap and cotesting was not done today  Current ASCCP Guidelines reviewed  3   Mammogram ordered  4  The following were reviewed in today's visit: breast self exam, mammography screening ordered, menopause, exercise and healthy diet  5  Enlarged uterus on exam / irregular menses: US ordered     CC:  Annual Gynecologic Examination    HPI: Roseanne Mendosa is a 50 y o  [id number] who presents for annual gynecologic examination  She has the following concerns:  Having cycles every 6 weeks that have become heavier / also notices that 2 weeks after her cycle she has more bleeding     Health Maintenance:    She wears her seatbelt routinely  She does perform regular monthly self breast exams  She feels safe at home       Patient Active Problem List   Diagnosis    Depression, major, recurrent ([address] )    Eczema    Enlarged uterus    Fatigue    Hand dermatitis    Irregular menses    Severe obesity (BMI 35 0-35 9 with comorbidity) [facility] [address] [facility]    Spider vein, symptomatic    Varicose veins    Vertigo    Weight gain    Arthralgia       Past Medical History:   Diagnosis Date    No known health problems        Past Surgical History:   Procedure Laterality Date    MOUTH SURGERY      ROOT CANAL      Apicoectomy; resolved; 17    WISDOM TOOTH EXTRACTION      18's       Past OB/Gyn History:  OB History      Para Term  AB Living    3 3 3     3    SAB TAB Ectopic Multiple Live Births            3           Family History   Problem Relation Age of Onset    Hypertension Mother    Mollie Sizer Rheum arthritis Mother     Cirrhosis Father     Leukemia Father         ALL    Diabetes Maternal Grandmother         mellitus    Hypertension Maternal Grandmother         pulmonary    Cancer Maternal Grandfather         face/throat    Anxiety disorder Paternal Grandmother     Depression Paternal Grandmother     Dementia Paternal Grandmother     Glaucoma Paternal Grandmother     Hypertension Paternal Grandmother     Lung cancer Paternal Grandfather     Heart attack Paternal Grandfather        Social History:  Social History     Social History    Marital status: /Civil Union     Spouse name: N/A    Number of children: N/A    Years of education: college student     Occupational History    employed      Social History Main Topics    Smoking status: Never Smoker    Smokeless tobacco: Never Used    Alcohol use Yes      Comment: occassional    Drug use: No    Sexual activity: Yes     Partners: Male     Birth control/ protection: Male Sterilization     Other Topics Concern    Not on file     Social History Narrative    Daily coffee consumption 2 cups a day    Household: Mother         No Known Allergies    Current Outpatient Prescriptions:     ergocalciferol (ERGOCALCIFEROL) 67974 units capsule, Take 1 capsule (50,000 Units total) by mouth once a week, Disp: 12 capsule, Rfl: 0    Multiple Vitamin (MULTIVITAMIN) tablet, Take 1 tablet by mouth daily, Disp: , Rfl:     Nutritional Supplements (VITAMIN D MAINTENANCE PO), Take by mouth daily, Disp: , Rfl:     Review of Systems:    Review of Systems  Constitutional :no fever, feels well, no tiredness, no recent weight gain or loss  ENT: no ear ache, no loss of hearing, no nosebleeds or nasal discharge, no sore throat or hoarseness  Cardiovascular: no complaints of slow or fast heart beat, no chest pain, no palpitations, no leg claudication or lower extremity edema  Respiratory: no complaints of shortness of shortness of breath, no BERNAL  Breasts:no complaints of breast pain, breast lump, or nipple discharge  Gastrointestinal: no complaints of abdominal pain, constipation, nausea, vomiting, or diarrhea or bloody stools  Genitourinary :as noted in HPI    Musculoskeletal: no complaints of arthralgia, no myalgia, no joint swelling or stiffness, no limb pain or swelling  Integumentary: no complaints of skin rash or lesion, itching or dry skin  Neurological: no complaints of headache, no confusion, no numbness or tingling, no dizziness or fainting    Objective      /82   Wt 95 9 kg (211 lb 6 4 oz)   LMP 08/16/2018 (Exact Date)   Breastfeeding? No   BMI 38 67 kg/m²     General:   appears stated age, cooperative, alert normal mood and affect   Heart: regular rate and rhythm, S1, S2 normal, no murmur, click, rub or gallop   Lungs: clear to auscultation bilaterally   Breasts: normal appearance, no masses or tenderness, Normal to palpation without dominant masses   Abdomen: soft, non-tender, without masses or organomegaly   Vulva: normal   Vagina: normal vagina, no discharge, exudate, lesion, or erythema   Urethra: normal   Cervix: Normal, no discharge  Nontender  Uterus: enlarged, 13 weeks size, anteverted, mobile and well supported   Adnexa: no mass, fullness, tenderness   Skin normal skin turgor and no rashes     Psychiatric orientation to person, place, and time: normal  mood and affect: normal

## 2018-09-28 ENCOUNTER — HOSPITAL ENCOUNTER (OUTPATIENT)
Dept: ULTRASOUND IMAGING | Facility: MEDICAL CENTER | Age: 48
Discharge: HOME | End: 2018-09-28
Payer: COMMERCIAL

## 2018-09-28 ENCOUNTER — HOSPITAL ENCOUNTER (OUTPATIENT)
Dept: MAMMOGRAPHY | Facility: MEDICAL CENTER | Age: 48
Discharge: HOME | End: 2018-09-28
Payer: COMMERCIAL

## 2018-09-28 DIAGNOSIS — Z12.31 ENCOUNTER FOR SCREENING MAMMOGRAM FOR MALIGNANT NEOPLASM OF BREAST: ICD-10-CM

## 2018-09-28 DIAGNOSIS — N92.6 IRREGULAR MENSES: ICD-10-CM

## 2018-09-28 PROCEDURE — 77063 BREAST TOMOSYNTHESIS BI: CPT

## 2018-09-28 PROCEDURE — 76830 TRANSVAGINAL US NON-OB: CPT

## 2018-09-28 PROCEDURE — 77067 SCR MAMMO BI INCL CAD: CPT

## 2018-09-28 PROCEDURE — 76856 US EXAM PELVIC COMPLETE: CPT

## 2018-10-01 ENCOUNTER — HOSPITAL ENCOUNTER (OUTPATIENT)
Dept: SLEEP CENTER | Facility: CLINIC | Age: 48
Discharge: HOME | End: 2018-10-01
Payer: COMMERCIAL

## 2018-10-01 DIAGNOSIS — R53.83 FATIGUE, UNSPECIFIED TYPE: ICD-10-CM

## 2018-10-01 PROCEDURE — G0399 HOME SLEEP TEST/TYPE 3 PORTA: HCPCS | Performed by: PSYCHIATRY & NEUROLOGY

## 2018-10-01 PROCEDURE — G0399 HOME SLEEP TEST/TYPE 3 PORTA: HCPCS

## 2018-10-01 NOTE — PROGRESS NOTES
Please inform patient US reviewed , normal ovaries , unremarkable lining of uterus ,small fibroid that does not need treatment

## 2018-10-05 DIAGNOSIS — G47.33 OSA (OBSTRUCTIVE SLEEP APNEA): Primary | ICD-10-CM

## 2018-10-08 ENCOUNTER — TELEPHONE (OUTPATIENT)
Dept: SLEEP CENTER | Facility: CLINIC | Age: 48
End: 2018-10-08

## 2018-10-08 NOTE — TELEPHONE ENCOUNTER
Left message for patient to return call    She will need to schedule a CPAP study and Consult with Dr Madhu Gardner

## 2018-10-20 NOTE — PROGRESS NOTES
[facility]  FAMILY PRACTICE OFFICE VISIT       NAME: Robyne Sicard  AGE: 50 y o  SEX: female       : 1970        MRN: [id number]    DATE: 10/22/2018  TIME: 10:23 AM    Assessment and Plan     Problem List Items Addressed This Visit     Fatigue     Persistent and likely related to recently diagnosed sleep apnea  She will follow-up on this with testing and consults as already scheduled  She will continue with her ergocalciferol and transition to 2000 units OTC daily after completion of 12 weeks of ergocalciferol  Severe obesity (BMI 35 0-35 9 with comorbidity) ([address] ) - Primary     Weight has increased by 7 pounds since last visit  Patient will start phentermine 15 mg daily and follow-up in 1 month  She was also encouraged to transition to black coffee which she states that she is able to do  She was encouraged to restart logging her food in My [employer]  She should be trying to get regular exercise - at least 150 minutes/week  Relevant Medications    phentermine 15 MG capsule    Arthralgia     Improved except for shoulders so far  She will continue with stretches and tennis ball treatment for sciatic type pain  She will also continue with chiropractor  Obstructive sleep apnea     Will be going for CPAP titration study in about 2 weeks and has sleep medicine follow-up a few weeks after that as well  Vitamin D deficiency       She will complete her 12 week course of ergocalciferol and then transition to 2000 units daily over-the-counter  Obstructive sleep apnea  Will be going for CPAP titration study in about 2 weeks and has sleep medicine follow-up a few weeks after that as well  Arthralgia  Improved except for shoulders so far  She will continue with stretches and tennis ball treatment for sciatic type pain  She will also continue with chiropractor  Fatigue  Persistent and likely related to recently diagnosed sleep apnea   She will follow-up on this with testing and consults as already scheduled  She will continue with her ergocalciferol and transition to 2000 units OTC daily after completion of 12 weeks of ergocalciferol  Severe obesity (BMI 35 0-35 9 with comorbidity) ([address] )  Weight has increased by 7 pounds since last visit  Patient will start phentermine 15 mg daily and follow-up in 1 month  She was also encouraged to transition to black coffee which she states that she is able to do  She was encouraged to restart logging her food in [employer]  She should be trying to get regular exercise - at least 150 minutes/week  Vitamin D deficiency    She will complete her 12 week course of ergocalciferol and then transition to 2000 units daily over-the-counter  Chief Complaint     Chief Complaint   Patient presents with    Weight Check    Follow-up     fatigue and joint pain       History of Present Illness   Gely Vasquez is a 50y o -year-old female who presents for 2 month follow-up on weight and arthralgias  The patient presents today for a follow-up on her weight in joint pain as well as fatigue  Last visit about 2 months ago, she noted that she had just joined weight watchers the day before and was going to work on getting back on track with diet and exercise  Since the patent's last visit, weight has increased 7 pounds  Diet is reported to be well balanced but then has emotional eating and a sweet tooth  Exercise occurs sporadically - notes that she has trouble getting herself to exercise due to poor exercise  She wants something to try to help curb her appetite  She had fogginess with Belviq and clenching with Contrave  She was noting difficulty with fatigue and continues to have trouble with this - feels that she can fall asleep as soon as she sits down in the evening - no energy to exercise  Labs ordered to follow up on this and was normal besides a low vit D level of 19 2   She has started the ergocalciferol as directed  A sleep study was also recommended to assess for sleep apnea  This was done, and was found to be positive for mild to moderate sleep apnea  She does have her CPAP titration sleep study scheduled in about 2 weeks  The patient noted joint pain as well during her last visit  She was sent for blood work to assess for autoimmune causes  This came back negative including a negative RF, negative inflammatory markers, negative HITESH, and also a negative Lyme titer  She was encouraged to work on weight loss as this would likely help with her joint pains  She feels that since her last visit, her joint pains have persisted  She notes that she is taking a thyroid supplement since labs in August showed a minimally low T3 free - notes that she is taking it only once daily rather than twice daily - notes that she has gotten this through the [facility] in [place]           Review of Systems   Review of Systems   Constitutional: Positive for fatigue and unexpected weight change  Respiratory: Negative for shortness of breath  Cardiovascular: Negative for chest pain, palpitations and leg swelling  Musculoskeletal: Positive for arthralgias (improved)  Neurological: Negative for dizziness and headaches         Active Problem List     Patient Active Problem List   Diagnosis    Depression, major, recurrent ([address] )    Eczema    Enlarged uterus    Fatigue    Hand dermatitis    Irregular menses    Severe obesity (BMI 35 0-35 9 with comorbidity) ([address] )    Spider vein, symptomatic    Varicose veins    Vertigo    Weight gain    Arthralgia    Obstructive sleep apnea    Vitamin D deficiency         Past Medical History:  Past Medical History:   Diagnosis Date    No known health problems        Past Surgical History:  Past Surgical History:   Procedure Laterality Date    MOUTH SURGERY      ROOT CANAL      Apicoectomy; resolved; 07/26/17    WISDOM TOOTH EXTRACTION      1980's       Family History:  Family History   Problem Relation Age of Onset    Hypertension Mother     Rheum arthritis Mother     Cirrhosis Father     Leukemia Father         ALL    Diabetes Maternal Grandmother         mellitus    Hypertension Maternal Grandmother         pulmonary    Cancer Maternal Grandfather         face/throat    Anxiety disorder Paternal Grandmother     Depression Paternal Grandmother     Dementia Paternal Grandmother     Glaucoma Paternal Grandmother     Hypertension Paternal Grandmother     Lung cancer Paternal Grandfather     Heart attack Paternal Grandfather        Social History:  Social History     Social History    Marital status: /Civil Union     Spouse name: N/A    Number of children: N/A    Years of education: college student     Occupational History    employed      Social History Main Topics    Smoking status: Never Smoker    Smokeless tobacco: Never Used    Alcohol use Yes      Comment: occassional    Drug use: No    Sexual activity: Yes     Partners: Male     Birth control/ protection: Male Sterilization     Other Topics Concern    Not on file     Social History Narrative    Daily coffee consumption 2 cups a day    Household: Mother           Objective     Vitals:    10/22/18 0824   BP: 108/88   BP Location: Right arm   Patient Position: Sitting   Cuff Size: Standard   Pulse: 82   Temp: 98 2 °F (36 8 °C)   SpO2: 97%   Weight: 97 6 kg (215 lb 4 oz)   Height: 5' 2" (1 575 m)     Wt Readings from Last 3 Encounters:   10/22/18 97 6 kg (215 lb 4 oz)   09/04/18 95 9 kg (211 lb 6 4 oz)   08/02/18 94 5 kg (208 lb 4 oz)       Physical Exam   Constitutional: She appears well-developed and well-nourished  No distress  Obese   Neck: Normal range of motion  Neck supple  No thyromegaly present  Cardiovascular: Normal rate, regular rhythm, normal heart sounds and intact distal pulses  No murmur heard    Pulmonary/Chest: Effort normal and breath sounds normal  She has no wheezes  She has no rales  Musculoskeletal: She exhibits no edema  Lymphadenopathy:     She has no cervical adenopathy  Skin: Skin is warm and dry  Psychiatric: She has a normal mood and affect  Her behavior is normal    Vitals reviewed  Pertinent Laboratory/Diagnostic Studies:  Lab Results   Component Value Date    BUN 13 08/03/2018    CREATININE 0 76 08/03/2018    CALCIUM 8 7 08/03/2018     08/03/2018    K 4 1 08/03/2018    CO2 28 08/03/2018     08/03/2018     Lab Results   Component Value Date    ALT 17 08/03/2018    AST 10 08/03/2018    ALKPHOS 68 08/03/2018       Lab Results   Component Value Date    WBC 6 69 08/03/2018    HGB 13 0 08/03/2018    HCT 39 6 08/03/2018    MCV 90 08/03/2018     08/03/2018     Lab Results   Component Value Date    TRIG 97 08/03/2018     Lab Results   Component Value Date    HDL 47 08/03/2018     Lab Results   Component Value Date    LDLCALC 58 08/03/2018     Lab Results   Component Value Date    HGBA1C 5 2 08/03/2018       Results for orders placed or performed in visit on 08/03/18   HITESH Screen w/ Reflex to Titer/Pattern   Result Value Ref Range    HITESH Negative Negative   C-reactive protein   Result Value Ref Range    CRP <3 0 <3 0 mg/L   RF Screen w/ Reflex to Titer   Result Value Ref Range    Rheumatoid Factor Negative Negative   Sedimentation rate, automated   Result Value Ref Range    Sed Rate 15 0 - 20 mm/hour   Vitamin D 25 hydroxy   Result Value Ref Range    Vit D, 25-Hydroxy 19 2 (L) 30 0 - 100 0 ng/mL   Lyme disease, western blot   Result Value Ref Range    Lyme 18 kD IgG Absent     Lyme 23 kD IgG Absent     Lyme 28 kD IgG Absent     Lyme 30 kD IgG Absent     Lyme 39 kD IgG Absent     Lyme 41 kD IgG Absent     Lyme 45 kD IgG Absent     Lyme 58 kD IgG Absent     Lyme 66 kD IgG Absent     Lyme 93 kD IgG Absent     Lyme 23 kD IgM Absent     Lyme 39 kD IgM Absent     Lyme 41 kD IgM Absent     Lyme IgG WB Interp  Negative     Lyme IgM WB Interp  Negative    T3, free   Result Value Ref Range    T3, Free 2 27 (L) 2 30 - 4 20 pg/mL   T3, reverse   Result Value Ref Range    T3, Reverse 20 3 9 2 - 24 1 ng/dL   T4, free   Result Value Ref Range    Free T4 0 90 0 76 - 1 46 ng/dL           ALLERGIES:  No Known Allergies    Current Medications     Current Outpatient Prescriptions   Medication Sig Dispense Refill    ergocalciferol (ERGOCALCIFEROL) 56015 units capsule Take 1 capsule (50,000 Units total) by mouth once a week 12 capsule 0    Multiple Vitamin (MULTIVITAMIN) tablet Take 1 tablet by mouth daily      phentermine 15 MG capsule Take 1 capsule (15 mg total) by mouth every morning 30 capsule 0     No current facility-administered medications for this visit            Health Maintenance     Health Maintenance   Topic Date Due    PAP SMEAR  03/14/2019    MAMMOGRAM  09/28/2019    DTaP,Tdap,and Td Vaccines (3 - Td) 10/09/2027    INFLUENZA VACCINE  Completed     Immunization History   Administered Date(s) Administered    Hep B, adult 07/24/2013, 08/26/2013, 03/12/2014    Influenza 01/01/2007    Influenza Quadrivalent 3 years and older 10/05/2018    Influenza Quadrivalent Preservative Free 3 years and older IM 10/03/2017    Influenza TIV (IM) 10/24/2009, 11/01/2015    Meningococcal, Unknown Serogroups 03/19/2014    Tdap 09/26/2007, 10/09/2017    Tuberculin Skin Test-PPD Intradermal 07/29/2013, 08/12/2013, 03/21/2014       Hilda Valenzuela PA-C  10/22/2018 10:23 AM  [facility]

## 2018-10-22 ENCOUNTER — OFFICE VISIT (OUTPATIENT)
Dept: FAMILY MEDICINE CLINIC | Facility: CLINIC | Age: 48
End: 2018-10-22
Payer: COMMERCIAL

## 2018-10-22 VITALS
HEART RATE: 82 BPM | BODY MASS INDEX: 39.61 KG/M2 | OXYGEN SATURATION: 97 % | SYSTOLIC BLOOD PRESSURE: 108 MMHG | DIASTOLIC BLOOD PRESSURE: 88 MMHG | TEMPERATURE: 98.2 F | WEIGHT: 215.25 LBS | HEIGHT: 62 IN

## 2018-10-22 DIAGNOSIS — R53.83 FATIGUE, UNSPECIFIED TYPE: ICD-10-CM

## 2018-10-22 DIAGNOSIS — G47.33 OBSTRUCTIVE SLEEP APNEA: ICD-10-CM

## 2018-10-22 DIAGNOSIS — M25.50 ARTHRALGIA, UNSPECIFIED JOINT: ICD-10-CM

## 2018-10-22 DIAGNOSIS — E55.9 VITAMIN D DEFICIENCY: ICD-10-CM

## 2018-10-22 DIAGNOSIS — E66.01 SEVERE OBESITY (BMI 35.0-35.9 WITH COMORBIDITY) (HCC): Primary | ICD-10-CM

## 2018-10-22 PROCEDURE — 3008F BODY MASS INDEX DOCD: CPT | Performed by: PHYSICIAN ASSISTANT

## 2018-10-22 PROCEDURE — 99214 OFFICE O/P EST MOD 30 MIN: CPT | Performed by: PHYSICIAN ASSISTANT

## 2018-10-22 RX ORDER — PHENTERMINE HYDROCHLORIDE 15 MG/1
15 CAPSULE ORAL EVERY MORNING
Qty: 30 CAPSULE | Refills: 0 | Status: SHIPPED | OUTPATIENT
Start: 2018-10-22 | End: 2018-12-07 | Stop reason: SDUPTHER

## 2018-10-22 NOTE — ASSESSMENT & PLAN NOTE
Weight has increased by 7 pounds since last visit  Patient will start phentermine 15 mg daily and follow-up in 1 month  She was also encouraged to transition to black coffee which she states that she is able to do  She was encouraged to restart logging her food in My [id number]  She should be trying to get regular exercise - at least 150 minutes/week  Exercise will hopefully feel easier to accomplish once her sleep apnea is addressed

## 2018-10-22 NOTE — ASSESSMENT & PLAN NOTE
Persistent and likely related to recently diagnosed sleep apnea  She will follow-up on this with testing and consults as already scheduled  She will continue with her ergocalciferol and transition to 2000 units OTC daily after completion of 12 weeks of ergocalciferol

## 2018-10-22 NOTE — ASSESSMENT & PLAN NOTE
Improved except for shoulders so far  She will continue with stretches and tennis ball treatment for sciatic type pain  She will also continue with chiropractor

## 2018-10-22 NOTE — ASSESSMENT & PLAN NOTE
She will complete her 12 week course of ergocalciferol and then transition to 2000 units daily over-the-counter

## 2018-10-22 NOTE — ASSESSMENT & PLAN NOTE
Will be going for CPAP titration study in about 2 weeks and has sleep medicine follow-up a few weeks after that as well

## 2018-11-08 ENCOUNTER — HOSPITAL ENCOUNTER (OUTPATIENT)
Dept: SLEEP CENTER | Facility: CLINIC | Age: 48
Discharge: HOME | End: 2018-11-08
Payer: COMMERCIAL

## 2018-11-08 DIAGNOSIS — G47.33 OSA (OBSTRUCTIVE SLEEP APNEA): ICD-10-CM

## 2018-11-08 PROCEDURE — 95811 POLYSOM 6/>YRS CPAP 4/> PARM: CPT

## 2018-11-09 NOTE — PROGRESS NOTES
Sleep Study Documentation    Pre-Sleep Study       Sleep testing procedure explained to patient:YES    Patient napped prior to study:NO    Caffeine:Dayshift worker after 12PM   Caffeine use:NO    Alcohol:Dayshift workers after 5PM: Alcohol use:NO    Typical day for patient:YES       Study Documentation  Treatment   Optimal PAP pressure: 6 cm H2O  Leak:Small  Snore:Eliminated  REM Obtained:yes  Supplemental O2: no    Minimum SaO2 92 %  Baseline SaO2 96 7 %  PAP mask tried (list all) Matias Garcia Eson 2 Medium nasal mask and heated humidity  PAP mask choice (final) same  PAP mask type:nasal  PAP pressure at which snoring was eliminated  6 cm H2O  Minimum SaO2 at final PAP pressure  92 %  Mode of Therapy:CPAP  ETCO2:No  CPAP changed to BiPAP:No    Mode of Therapy:CPAP    EKG abnormalities: no     EEG abnormalities: no    Study Terminated:no    Patient classification: employed       Post-Sleep Study    Medication used at bedtime or during sleep study:NO    Patient reports time it took to fall asleep:less than 20 minutes    Patient reports waking up during study:3 or more times  Patient reports returning to sleep without difficulty  Patient reports sleeping 4 to 6 hours without dreaming  Patient reports sleep during study:typical    Patient rated sleepiness: Somewhat sleepy or tired    PAP treatment:yes: Post PAP treatment patient reports feeling unchanged and would wear PAP mask at home

## 2018-11-12 ENCOUNTER — TELEPHONE (OUTPATIENT)
Dept: SLEEP CENTER | Facility: CLINIC | Age: 48
End: 2018-11-12

## 2018-11-12 NOTE — TELEPHONE ENCOUNTER
Spoke with patient  Sleep study resulted  Confirmed her appt with Dr Soo Whittington and DME provider on 11/30/2018  Will send  DME paperwork when RX obtained  Requested order for Cpap be place in [facility]

## 2018-11-14 DIAGNOSIS — G47.33 OBSTRUCTIVE SLEEP APNEA: Primary | ICD-10-CM

## 2018-12-07 DIAGNOSIS — E66.01 SEVERE OBESITY (BMI 35.0-35.9 WITH COMORBIDITY) (HCC): ICD-10-CM

## 2018-12-07 RX ORDER — PHENTERMINE HYDROCHLORIDE 15 MG/1
15 CAPSULE ORAL EVERY MORNING
Qty: 30 CAPSULE | Refills: 0 | Status: SHIPPED | OUTPATIENT
Start: 2018-12-07 | End: 2020-07-29 | Stop reason: ALTCHOICE

## 2019-01-15 ENCOUNTER — APPOINTMENT (OUTPATIENT)
Dept: RADIOLOGY | Age: 49
End: 2019-01-15
Payer: OTHER MISCELLANEOUS

## 2019-01-15 ENCOUNTER — APPOINTMENT (OUTPATIENT)
Dept: URGENT CARE | Age: 49
End: 2019-01-15
Payer: OTHER MISCELLANEOUS

## 2019-01-15 ENCOUNTER — TRANSCRIBE ORDERS (OUTPATIENT)
Dept: ADMINISTRATIVE | Age: 49
End: 2019-01-15

## 2019-01-15 DIAGNOSIS — T14.90XA INJURY: Primary | ICD-10-CM

## 2019-01-15 DIAGNOSIS — T14.90XA INJURY: ICD-10-CM

## 2019-01-15 PROCEDURE — 99283 EMERGENCY DEPT VISIT LOW MDM: CPT | Performed by: PREVENTIVE MEDICINE

## 2019-01-15 PROCEDURE — 72040 X-RAY EXAM NECK SPINE 2-3 VW: CPT

## 2019-01-15 PROCEDURE — G0382 LEV 3 HOSP TYPE B ED VISIT: HCPCS | Performed by: PREVENTIVE MEDICINE

## 2019-01-19 ENCOUNTER — APPOINTMENT (OUTPATIENT)
Dept: URGENT CARE | Age: 49
End: 2019-01-19
Payer: OTHER MISCELLANEOUS

## 2019-01-19 PROCEDURE — 99213 OFFICE O/P EST LOW 20 MIN: CPT | Performed by: PREVENTIVE MEDICINE

## 2019-01-23 ENCOUNTER — OFFICE VISIT (OUTPATIENT)
Dept: SLEEP CENTER | Facility: CLINIC | Age: 49
End: 2019-01-23
Payer: COMMERCIAL

## 2019-01-23 VITALS
BODY MASS INDEX: 39.64 KG/M2 | OXYGEN SATURATION: 96 % | HEIGHT: 62 IN | SYSTOLIC BLOOD PRESSURE: 114 MMHG | WEIGHT: 215.4 LBS | DIASTOLIC BLOOD PRESSURE: 80 MMHG | HEART RATE: 85 BPM

## 2019-01-23 DIAGNOSIS — G47.33 OBSTRUCTIVE SLEEP APNEA TREATED WITH CONTINUOUS POSITIVE AIRWAY PRESSURE (CPAP): Primary | ICD-10-CM

## 2019-01-23 DIAGNOSIS — E66.9 OBESITY (BMI 30-39.9): ICD-10-CM

## 2019-01-23 DIAGNOSIS — Z99.89 OBSTRUCTIVE SLEEP APNEA TREATED WITH CONTINUOUS POSITIVE AIRWAY PRESSURE (CPAP): Primary | ICD-10-CM

## 2019-01-23 PROCEDURE — 99244 OFF/OP CNSLTJ NEW/EST MOD 40: CPT | Performed by: NURSE PRACTITIONER

## 2019-01-23 NOTE — PROGRESS NOTES
Consultation - [facility], 1970, MRN: [id number]    1/23/2019        Reason for Consult / Principal Problem:    Obstructive Sleep Apnea  Obesity     Subjective:     HPI: Srinivasa Brooks is a 50y o  year old female  She presents for a new patient evaluation of obstructive sleep apnea  She complains of loud snoring with gasping, choking and coughing during sleep  She typically sleeps on multiple pillows to elevate her head while sleeping, which had helped in the past   She had intentionally lost approximately 30lbs one year ago, but unfortunately over the past 9 months, she has regained all of the weight  Her symptoms have worsened since weight has been regained  Her PCP ordered a home sleep study, which showed mild sleep apnea with AHI of 13  A CPAP titration study was subsequently completed  She is here today to review the results of her studies and to discuss treatment options  Review of Systems      Genitourinary none   Cardiology none   Gastrointestinal none   Neurology none   Constitutional fatigue and weight change   Integumentary none   Psychiatry none   Musculoskeletal back pain and sciatica   Pulmonary snoring   ENT throat clearing   Endocrine none   Hematological none       Employment:  She currently works full-time for [employer], Monday through Friday between the hours of 8-430    Sleep Schedule:       Bedtime:  Between 11:00 p m  And 11:30 p m  Latency:  Immediately      Wakeup time:  Between 7:30 a m  And 8:00 a m , she does not feel well rested upon awakening    Awakenings:       Frequency:  Between 3 and 4 times per night      Causes:   To use the bathroom, her dogs barking, pain in her left hip which causes her to reposition herself and sometimes due to snoring with choking and coughing      Duration:  Typically returns to sleep relatively easily in a few minutes    Daytime Sleepiness / Inappropriate Sleep:       Most severe:  Late afternoon       Naps :  She intentionally takes a nap on weekends, occasionally when returning home from work      Time:  In the afternoon      Duration:  90 minutes - 2 hours if she does not set an alarm      Inappropriate drowsiness / sleep:  She unintentionally falls asleep easily while watching TV in the evening    Snoring:  She snores loudly, gasps and chokes    Apnea: No witnessed apnea    Change in Weight:  She intentionally lost approximately 30 lb 1 year ago and then regained weight over the past 6-9 months    Restless Leg Syndrome:  She experiences clinical symptoms consistent with this diagnosis in the evening while watching TV and when she lays down to go to sleep  Other Complaints:  Her  has reported that she mumbles in her sleep  She denies any sleep walking  She denies any sleep paralysis or hallucinations surrounding sleep  She reports bruxism and she has used a mouth guard in the past      Social History:      Caffeine:  1-2 cups of coffee in the morning, occasionally a cup of coffee in the afternoon       Tobacco:   reports that she has never smoked  She has never used smokeless tobacco        Alcohol:   reports that she drinks alcohol  Social alcohol use     Drugs:   reports that she does not use drugs  The review of systems and following portions of the patient's history were reviewed and updated as appropriate: allergies, current medications, past family history, past medical history, past social history, past surgical history and problem list         Objective:       Vitals:    01/23/19 0800   BP: 114/80   Pulse: 85   SpO2: 96%   Weight: 97 7 kg (215 lb 6 4 oz)   Height: 5' 2" (1 575 m)     Body mass index is 39 4 kg/m²  Neck Circumference: 32 (cm)  [place] Sleepiness Scale:  Total score: 14      Current Outpatient Prescriptions:     ergocalciferol (ERGOCALCIFEROL) 13554 units capsule, Take 1 capsule (50,000 Units total) by mouth once a week, Disp: 12 capsule, Rfl: 0   Ibuprofen (ADVIL PO), Take by mouth as needed, Disp: , Rfl:     Multiple Vitamin (MULTIVITAMIN) tablet, Take 1 tablet by mouth daily, Disp: , Rfl:     phentermine 15 MG capsule, Take 1 capsule (15 mg total) by mouth every morning, Disp: 30 capsule, Rfl: 0    Physical Exam  General Appearance:   Alert, cooperative, no distress, appears stated age,      Head:   Normocephalic, without obvious abnormality, atraumatic     Eyes:   PERRL, conjunctiva/corneas clear, EOM's intact          Nose:  Nares normal, septum midline, mucosa normal, no drainage or sinus tenderness           Throat:  Lips, teeth and gums normal; tongue normal size and  shape and midline in position; mucosa moist and moderately redundant bilaterally, uvula thick at the base, tonsils not visualized, Mallampati class 3-4       Neck:  Supple, symmetrical, trachea midline, no adenopathy; Thyroid: No enlargement, tenderness or nodules; no carotid bruit or JVD     Lungs:      Clear to auscultation bilaterally, respirations unlabored     Heart:   Regular rate and rhythm, S1 and S2 normal, no murmur, rub or gallop       Extremities:  Extremities normal, atraumatic, no cyanosis or edema     Pulses:  2+ and symmetric all extremities     Skin:  Skin color, texture, turgor normal, no rashes or lesions       Neurologic:  No focal deficits noted  Normal strength, sensation throughout     Sleep Study Results:    Results of home sleep study completed on 10/4/18 are as follows: This study demonstrates a total of 25 apneas and 66 hypopneas  AHI is 13 2 for the entire study  In the supine position this  number increases to 27 3  Lowest measured oxygen saturation is 86%  No other abnormalities are seen  These findings are  consistent with a diagnosis of mild to moderate obstructive sleep apnea  A repeat tracing for titration of nasal CPAP is  Recommended  Results of CPAP titration study completed on 11//9//18 are as follows:   This study demonstrates a total of 25 apneas and 66 hypopneas  AHI is 13 2 for the entire study  In the supine position this  number increases to 27 3  Lowest measured oxygen saturation is 86%  No other abnormalities are seen  These findings are  consistent with a diagnosis of mild to moderate obstructive sleep apnea  A repeat tracing for titration of nasal CPAP is  recommended  ASSESSMENT / PLAN     1  Obstructive sleep apnea treated with continuous positive airway pressure (CPAP)     2  Obesity (BMI 30-39  9)       Plan:  Begin CPAP use every night  Continue to work on efforts at weight loss  Schedule follow up visit in 2-3 months  Counseling / Coordination of Care  Total clinic time spent today 60 minutes  Greater than 50% of total time was spent with the patient and / or family counseling and / or coordination of care  A description of the counseling / coordination of care:     diagnostic results, instructions for management, risk factor reductions, prognosis, patient and family education, impressions, risks and benefits of treatment options and importance of compliance with treatment    I reviewed the recent test results with the patient  We talked about the abnormal findings, including those consistent with Obstructive Sleep Apnea  We then discussed how the these are categorized as mild, moderate or severe  We also covered the medical comorbidities associated with untreated Obstructive Sleep Apnea including, hypertension, cardiac arrythmia, myocardial infarction and stroke  I explained how the risk of these conditions increases with the severity of the test results  I also spoke in some detail about the most common treatment options including weight loss, nasal PAP, mandibular advancement devices and uvulopalatopharyngoplasty (UPPP)  I answered all questions posed to me and gave my opinion regarding the best options for treatment based on the patient and their level of disease    In this case she chose to begin treatment using CPAP, which is set at 6cm of water pressure  The patient will return in 8 to 12 weeks for a review of compliance data collected since beginning treatment  We will discuss this data and talk about any problems that may prevent successful treatment of Obstructive Sleep Apnea  Changes will be made in treatment parameters or interface devices in order to improve her ability to continue using PAP to maintain upper airway patency during sleep  She was encouraged to continue to work on efforts at weight loss with diet and exercise  The following instructions have been given to the patient today:    Patient Instructions   1   your CPAP equipment today  2  Begin using your CPAP equipment every night  3  Begin cleaning your CPAP with mild soap and water daily after use and once weekly with 1 part white vinegar and 10 parts water  4  Contact your medical equipment distributor regarding any questions concerning your CPAP equipment  5  Contact the Sleep [address] [place] [address] with any other questions, prior to next visit, if needed  6    Schedule compliance follow-up visit in 2-3 months          [facility], [address] [facility]

## 2019-01-23 NOTE — PATIENT INSTRUCTIONS
1    your CPAP equipment today  2  Begin using your CPAP equipment every night  3  Begin cleaning your CPAP with mild soap and water daily after use and once weekly with 1 part white vinegar and 10 parts water  4  Contact your medical equipment distributor regarding any questions concerning your CPAP equipment  5  Contact the Sleep [address] [place] [address] with any other questions, prior to next visit, if needed  6    Schedule compliance follow-up visit in 2-3 months

## 2019-02-22 ENCOUNTER — APPOINTMENT (OUTPATIENT)
Dept: URGENT CARE | Age: 49
End: 2019-02-22
Payer: OTHER MISCELLANEOUS

## 2019-02-22 PROCEDURE — 99213 OFFICE O/P EST LOW 20 MIN: CPT | Performed by: PREVENTIVE MEDICINE

## 2019-03-11 ENCOUNTER — EVALUATION (OUTPATIENT)
Dept: PHYSICAL THERAPY | Facility: CLINIC | Age: 49
End: 2019-03-11
Payer: OTHER MISCELLANEOUS

## 2019-03-11 DIAGNOSIS — M54.5 LOW BACK PAIN, UNSPECIFIED BACK PAIN LATERALITY, UNSPECIFIED CHRONICITY, WITH SCIATICA PRESENCE UNSPECIFIED: ICD-10-CM

## 2019-03-11 DIAGNOSIS — M54.2 CERVICALGIA: Primary | ICD-10-CM
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
## 2019-03-11 DIAGNOSIS — M54.6 PAIN IN THORACIC SPINE: ICD-10-CM

## 2019-03-11 PROCEDURE — 97162 PT EVAL MOD COMPLEX 30 MIN: CPT | Performed by: PHYSICAL THERAPIST
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
## 2019-03-15 ENCOUNTER — OFFICE VISIT (OUTPATIENT)
Dept: PHYSICAL THERAPY | Facility: CLINIC | Age: 49
End: 2019-03-15
Payer: OTHER MISCELLANEOUS
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
## 2019-03-15 DIAGNOSIS — M54.2 CERVICALGIA: Primary | ICD-10-CM

## 2019-03-15 DIAGNOSIS — M54.5 LOW BACK PAIN, UNSPECIFIED BACK PAIN LATERALITY, UNSPECIFIED CHRONICITY, WITH SCIATICA PRESENCE UNSPECIFIED: ICD-10-CM

## 2019-03-15 DIAGNOSIS — M54.6 PAIN IN THORACIC SPINE: ICD-10-CM

## 2019-03-15 PROCEDURE — 97110 THERAPEUTIC EXERCISES: CPT

## 2019-03-15 PROCEDURE — 97140 MANUAL THERAPY 1/> REGIONS: CPT

## 2019-03-15 PROCEDURE — 97112 NEUROMUSCULAR REEDUCATION: CPT
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
## 2019-03-18 ENCOUNTER — OFFICE VISIT (OUTPATIENT)
Dept: PHYSICAL THERAPY | Facility: CLINIC | Age: 49
End: 2019-03-18
Payer: OTHER MISCELLANEOUS

## 2019-03-18 DIAGNOSIS — M54.2 CERVICALGIA: Primary | ICD-10-CM
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
## 2019-03-18 DIAGNOSIS — M54.6 PAIN IN THORACIC SPINE: ICD-10-CM

## 2019-03-18 DIAGNOSIS — M54.5 LOW BACK PAIN, UNSPECIFIED BACK PAIN LATERALITY, UNSPECIFIED CHRONICITY, WITH SCIATICA PRESENCE UNSPECIFIED: ICD-10-CM

## 2019-03-18 PROCEDURE — 97112 NEUROMUSCULAR REEDUCATION: CPT

## 2019-03-18 PROCEDURE — 97140 MANUAL THERAPY 1/> REGIONS: CPT

## 2019-03-18 PROCEDURE — 97110 THERAPEUTIC EXERCISES: CPT
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
## 2019-03-21 ENCOUNTER — OFFICE VISIT (OUTPATIENT)
Dept: PHYSICAL THERAPY | Facility: CLINIC | Age: 49
End: 2019-03-21
Payer: OTHER MISCELLANEOUS
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
## 2019-03-21 DIAGNOSIS — M54.6 PAIN IN THORACIC SPINE: ICD-10-CM

## 2019-03-21 DIAGNOSIS — M54.2 CERVICALGIA: Primary | ICD-10-CM

## 2019-03-21 DIAGNOSIS — M54.5 LOW BACK PAIN, UNSPECIFIED BACK PAIN LATERALITY, UNSPECIFIED CHRONICITY, WITH SCIATICA PRESENCE UNSPECIFIED: ICD-10-CM

## 2019-03-21 PROCEDURE — 97112 NEUROMUSCULAR REEDUCATION: CPT

## 2019-03-21 PROCEDURE — 97010 HOT OR COLD PACKS THERAPY: CPT

## 2019-03-21 PROCEDURE — 97110 THERAPEUTIC EXERCISES: CPT

## 2019-03-21 PROCEDURE — 97140 MANUAL THERAPY 1/> REGIONS: CPT
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
## 2019-03-25 ENCOUNTER — OFFICE VISIT (OUTPATIENT)
Dept: PHYSICAL THERAPY | Facility: CLINIC | Age: 49
End: 2019-03-25
Payer: OTHER MISCELLANEOUS

## 2019-03-25 DIAGNOSIS — M54.2 CERVICALGIA: Primary | ICD-10-CM
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
## 2019-03-25 DIAGNOSIS — M54.5 LOW BACK PAIN, UNSPECIFIED BACK PAIN LATERALITY, UNSPECIFIED CHRONICITY, WITH SCIATICA PRESENCE UNSPECIFIED: ICD-10-CM

## 2019-03-25 DIAGNOSIS — M54.6 PAIN IN THORACIC SPINE: ICD-10-CM

## 2019-03-25 PROCEDURE — 97010 HOT OR COLD PACKS THERAPY: CPT | Performed by: PHYSICAL THERAPIST

## 2019-03-25 PROCEDURE — 97140 MANUAL THERAPY 1/> REGIONS: CPT | Performed by: PHYSICAL THERAPIST

## 2019-03-25 PROCEDURE — 97110 THERAPEUTIC EXERCISES: CPT | Performed by: PHYSICAL THERAPIST

## 2019-03-25 PROCEDURE — 97112 NEUROMUSCULAR REEDUCATION: CPT | Performed by: PHYSICAL THERAPIST
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
## 2019-03-26 ENCOUNTER — OFFICE VISIT (OUTPATIENT)
Dept: SLEEP CENTER | Facility: CLINIC | Age: 49
End: 2019-03-26
Payer: COMMERCIAL

## 2019-03-26 VITALS
WEIGHT: 217 LBS | DIASTOLIC BLOOD PRESSURE: 72 MMHG | BODY MASS INDEX: 39.93 KG/M2 | HEART RATE: 84 BPM | SYSTOLIC BLOOD PRESSURE: 110 MMHG | HEIGHT: 62 IN

## 2019-03-26 DIAGNOSIS — G47.33 OBSTRUCTIVE SLEEP APNEA TREATED WITH CONTINUOUS POSITIVE AIRWAY PRESSURE (CPAP): Primary | ICD-10-CM

## 2019-03-26 DIAGNOSIS — Z99.89 OBSTRUCTIVE SLEEP APNEA TREATED WITH CONTINUOUS POSITIVE AIRWAY PRESSURE (CPAP): Primary | ICD-10-CM

## 2019-03-26 PROCEDURE — 99213 OFFICE O/P EST LOW 20 MIN: CPT | Performed by: NURSE PRACTITIONER

## 2019-03-26 NOTE — PROGRESS NOTES
Progress Note - [address] 50 y o  female   :1970, MRN: [id number]  3/26/2019          Follow Up Evaluation / Problem:     Obstructive Sleep Apnea  Obesity    HPI: Kulwant Gu is a 50y o  year old female  She presents for follow up of obstructive sleep apnea  She complained of loud snoring, gasping, choking and coughing during sleep  She completed a home sleep study and was found to have mild sleep apnea with an AHI of 13  Her comorbid conditions include obesity, fatigue and depression  She completed a CPAP titration study and began the use of CPAP at 6cm of water pressure  She is here to review compliance and effectiveness of treatment  Review of Systems      Genitourinary hot flashes at night   Cardiology ankle/leg swelling   Gastrointestinal abdominal pain or cramping that disturb sleep    Neurology muscle weakness and numbness/tingling of an extremity   Constitutional fatigue   Integumentary none   Psychiatry none   Musculoskeletal joint pain, back pain and sciatica   Pulmonary snoring   ENT none   Endocrine none   Hematological none       Current Outpatient Medications:     ergocalciferol (ERGOCALCIFEROL) 61272 units capsule, Take 1 capsule (50,000 Units total) by mouth once a week, Disp: 12 capsule, Rfl: 0    Ibuprofen (ADVIL PO), Take by mouth as needed, Disp: , Rfl:     Multiple Vitamin (MULTIVITAMIN) tablet, Take 1 tablet by mouth daily, Disp: , Rfl:     phentermine 15 MG capsule, Take 1 capsule (15 mg total) by mouth every morning (Patient not taking: Reported on 3/11/2019), Disp: 30 capsule, Rfl: 0    [place] Sleepiness Scale  Sitting and reading: High chance of dozing  Watching TV: Moderate chance of dozing  Sitting, inactive in a public place (e g  a theatre or a meeting):  Moderate chance of dozing  As a passenger in a car for an hour without a break: High chance of dozing  Lying down to rest in the afternoon when circumstances permit: High chance of dozing  Sitting and talking to someone: Slight chance of dozing  Sitting quietly after a lunch without alcohol: Slight chance of dozing  In a car, while stopped for a few minutes in traffic: Slight chance of dozing  Total score: 16              Vitals:    03/26/19 1500   BP: 110/72   Pulse: 84   Weight: 98 4 kg (217 lb)   Height: 5' 2" (1 575 m)       Body mass index is 39 69 kg/m²  Neck Circumference: 32       EPWORTH SLEEPINESS SCORE  Total score: 16      Past History Since Last Sleep Center Visit:   She denies any changes to her health since her last visit  She has been using CPAP treatment on most nights  She has not been consistent with keeping the mask on all night  She finds that both the nasal and full face masks have been leaking, which wakes her up  She feels she is in a light sleep at that time and she removes the mask while she is still sleeping  She has not noticed any significant change in her daytime sleepiness since starting CPAP treatment  She goes to bed at midnight or later and awakens between 6-9:00am   She reports that she has been cleaning her equipment appropriately and has changed the filter  The review of systems and following portions of the patient's history were reviewed and updated as appropriate: allergies, current medications, past family history, past medical history, past social history, past surgical history, and problem list         OBJECTIVE    PAP Pressure: Nasal CPAP set to deliver 6 cm of water pressure  DME Provider:  [facility]    Physical Exam:     General Appearance:   Alert, cooperative, no distress, appears stated age, obese     Head:   Normocephalic, without obvious abnormality, atraumatic     Eyes:   PERRL, conjunctiva/corneas clear, EOM's intact          Nose:  Nares normal, septum midline, no drainage or sinus tenderness           Throat:  Lips, teeth and gums normal; tongue normal size and  shape and midline mucosa moist and redundant bilaterally, uvula thick at the base and only partially visible, tonsils not visualized, Mallampati class 3-4       Neck:  Supple, symmetrical, trachea midline, no adenopathy; Thyroid: No enlargement, tenderness or nodules; no carotid bruit or JVD     Lungs:      Clear to auscultation bilaterally, respirations unlabored     Heart:   Regular rate and rhythm, S1 and S2 normal, no murmur, rub or gallop       Extremities:  Extremities normal, atraumatic, no cyanosis or edema       Skin:  Skin color, texture, turgor normal, no rashes or lesions       Neurologic:  CNII-XII intact  Normal strength, sensation throughout       ASSESSMENT / PLAN    1  Obstructive sleep apnea treated with continuous positive airway pressure (CPAP)  PAP DME Resupply/Reorder    PAP DME Pressure Change           Counseling / Coordination of Care  Total clinic time spent today 30 minutes  Greater than 50% of total time was spent with the patient and / or family counseling and / or coordination of care  A description of the counseling / coordination of care:     Impressions, Diagnostic results, Prognosis, Instructions for management, Risks and benefits of treatment, Patient and family education, Risk factor reductions and Importance of compliance with treatment    Today I reviewed the patient's compliance data  she has been able to use the equipment 73 3% of all days recorded  Average usage was 4 or more hours 53 3% of all days recorded  The estimated AHI is 11 abnormal breathing events per hour  Response to treatment has been fair  A mask fitting has been facilitated today, to help her to find a mask she is able to keep on all night  A pressure change has been done to improve her therapy and decrease abnormal breathing events per hour  APAP will be used from a low pressure of 6cm to a high of 10cm of water pressure  She will continue using this equipment at the settings noted above for the next 6 months    At that timeshe will then return for a routine follow-up evaluation  We also discussed that overall, she is not getting enough hours of sleep per night, which is also likely to be contributing to her sleepiness  We discussed striving for 7-8 hours of sleep per night to see if she feels more refreshed upon awakening  I have asked her to contact the [facility] if she encounters any difficulties prior to that time  The following instructions have been given to the patient today:    Patient Instructions   1  Continue use of CPAP equipment nightly - complete mask fitting today  Pressure change to autopap will be done today  2  Continue to clean your equipment, as discussed  3  Contact the [facility] with any questions or concerns prior to your next visit, as needed  4  Schedule visit for follow-up in 6 months  5  Work on increasing hours of sleep daily to include 7-8 on a regular basis          Natalie Grace, [address] [facility]

## 2019-03-26 NOTE — PATIENT INSTRUCTIONS
1   Continue use of CPAP equipment nightly - complete mask fitting today  Pressure change to autopap will be done today  2  Continue to clean your equipment, as discussed  3  Contact the [facility] with any questions or concerns prior to your next visit, as needed  4  Schedule visit for follow-up in 6 months  5  Work on increasing hours of sleep daily to include 7-8 on a regular basis

## 2019-03-27 ENCOUNTER — TELEPHONE (OUTPATIENT)
Dept: SLEEP CENTER | Facility: CLINIC | Age: 49
End: 2019-03-27

## 2019-04-04 ENCOUNTER — OFFICE VISIT (OUTPATIENT)
Dept: PHYSICAL THERAPY | Facility: CLINIC | Age: 49
End: 2019-04-04
Payer: OTHER MISCELLANEOUS
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
## 2019-04-04 DIAGNOSIS — M54.6 PAIN IN THORACIC SPINE: ICD-10-CM

## 2019-04-04 DIAGNOSIS — M54.2 CERVICALGIA: Primary | ICD-10-CM

## 2019-04-04 DIAGNOSIS — M54.5 LOW BACK PAIN, UNSPECIFIED BACK PAIN LATERALITY, UNSPECIFIED CHRONICITY, WITH SCIATICA PRESENCE UNSPECIFIED: ICD-10-CM

## 2019-04-04 PROCEDURE — 97110 THERAPEUTIC EXERCISES: CPT

## 2019-04-04 PROCEDURE — 97010 HOT OR COLD PACKS THERAPY: CPT

## 2019-04-04 PROCEDURE — 97140 MANUAL THERAPY 1/> REGIONS: CPT

## 2019-04-08 ENCOUNTER — APPOINTMENT (OUTPATIENT)
Dept: PHYSICAL THERAPY | Facility: CLINIC | Age: 49
End: 2019-04-08
Payer: OTHER MISCELLANEOUS

## 2019-04-11 ENCOUNTER — APPOINTMENT (OUTPATIENT)
Dept: PHYSICAL THERAPY | Facility: CLINIC | Age: 49
End: 2019-04-11
Payer: OTHER MISCELLANEOUS

## 2019-04-15 ENCOUNTER — APPOINTMENT (OUTPATIENT)
Dept: PHYSICAL THERAPY | Facility: CLINIC | Age: 49
End: 2019-04-15
Payer: OTHER MISCELLANEOUS

## 2019-04-18 ENCOUNTER — APPOINTMENT (OUTPATIENT)
Dept: PHYSICAL THERAPY | Facility: CLINIC | Age: 49
End: 2019-04-18
Payer: OTHER MISCELLANEOUS

## 2019-04-26 ENCOUNTER — TRANSCRIBE ORDERS (OUTPATIENT)
Dept: PHYSICAL THERAPY | Facility: CLINIC | Age: 49
End: 2019-04-26

## 2019-04-26 DIAGNOSIS — M54.2 CERVICALGIA: Primary | ICD-10-CM

## 2019-04-26 DIAGNOSIS — M54.5 LOW BACK PAIN, UNSPECIFIED BACK PAIN LATERALITY, UNSPECIFIED CHRONICITY, WITH SCIATICA PRESENCE UNSPECIFIED: ICD-10-CM

## 2019-04-26 DIAGNOSIS — M54.6 PAIN IN THORACIC SPINE: ICD-10-CM

## 2019-05-22 ENCOUNTER — OFFICE VISIT (OUTPATIENT)
Dept: URGENT CARE | Facility: MEDICAL CENTER | Age: 49
End: 2019-05-22
Payer: COMMERCIAL

## 2019-05-22 VITALS
SYSTOLIC BLOOD PRESSURE: 119 MMHG | HEIGHT: 62 IN | DIASTOLIC BLOOD PRESSURE: 68 MMHG | OXYGEN SATURATION: 98 % | RESPIRATION RATE: 16 BRPM | WEIGHT: 206.8 LBS | TEMPERATURE: 99.3 F | BODY MASS INDEX: 38.05 KG/M2 | HEART RATE: 86 BPM

## 2019-05-22 DIAGNOSIS — J18.9 PNEUMONIA DUE TO INFECTIOUS ORGANISM, UNSPECIFIED LATERALITY, UNSPECIFIED PART OF LUNG: Primary | ICD-10-CM

## 2019-05-22 DIAGNOSIS — H10.9 BACTERIAL CONJUNCTIVITIS: ICD-10-CM

## 2019-05-22 PROCEDURE — S9088 SERVICES PROVIDED IN URGENT: HCPCS | Performed by: FAMILY MEDICINE

## 2019-05-22 PROCEDURE — 99214 OFFICE O/P EST MOD 30 MIN: CPT | Performed by: FAMILY MEDICINE

## 2019-05-22 RX ORDER — POLYMYXIN B SULFATE AND TRIMETHOPRIM 1; 10000 MG/ML; [USP'U]/ML
1 SOLUTION OPHTHALMIC EVERY 6 HOURS
Qty: 5 ML | Refills: 0 | Status: SHIPPED | OUTPATIENT
Start: 2019-05-22 | End: 2019-05-27

## 2019-05-22 RX ORDER — AZITHROMYCIN 250 MG/1
TABLET, FILM COATED ORAL
Qty: 6 TABLET | Refills: 0 | Status: SHIPPED | OUTPATIENT
Start: 2019-05-22 | End: 2019-05-26

## 2019-05-22 RX ORDER — BROMPHENIRAMINE MALEATE, PSEUDOEPHEDRINE HYDROCHLORIDE, AND DEXTROMETHORPHAN HYDROBROMIDE 2; 30; 10 MG/5ML; MG/5ML; MG/5ML
5 SYRUP ORAL 3 TIMES DAILY PRN
Qty: 120 ML | Refills: 0 | Status: SHIPPED | OUTPATIENT
Start: 2019-05-22 | End: 2020-07-29 | Stop reason: ALTCHOICE

## 2019-07-18 ENCOUNTER — AMB VIDEO VISIT (OUTPATIENT)
Dept: URGENT CARE | Facility: CLINIC | Age: 49
End: 2019-07-18
Payer: COMMERCIAL

## 2019-07-18 DIAGNOSIS — B37.2 CANDIDAL INTERTRIGO: Primary | ICD-10-CM

## 2019-07-18 PROCEDURE — 99201 PR OFFICE OUTPATIENT NEW 10 MINUTES: CPT | Performed by: FAMILY MEDICINE

## 2019-07-18 RX ORDER — NYSTATIN 100000 [USP'U]/G
POWDER TOPICAL 2 TIMES DAILY
Qty: 60 G | Refills: 0 | Status: SHIPPED | OUTPATIENT
Start: 2019-07-18 | End: 2020-07-29 | Stop reason: ALTCHOICE

## 2019-07-19 ENCOUNTER — AMB VIDEO VISIT (OUTPATIENT)
Dept: OTHER | Facility: HOSPITAL | Age: 49
End: 2019-07-19

## 2019-07-19 NOTE — CARE ANYWHERE EVISITS
Visit Summary for [address] Gender: Female - Date of Birth: [id number]  Date: [id number] - Duration: 3 minutes  Patient: Laury Ha  Provider: Man Burr    Patient Contact Information  Address  [address] [place] [address]; [address] [place] [address]  [phone]    Visit Topics  Rash [Added By: Self - 2019-07-18]    Conversation Transcripts  [0A][0A] [Notification] You are connected with Man Burr, Urgent Care Specialist [0A][Notification] Patient has shared 1 file[0A][Notification] Laury Ha is located in [place]  [0A][Notification] Laury Ha has shared health history  Elaine Cutting  [0A]    Diagnosis    Procedures  Value: [id number] Code: CPT-4 UNLISTED E&M SERVICE    Medications Prescribed    No prescriptions ordered    Electronically signed by: Carolyn Alejandre(NPI [id number])

## 2019-07-24 ENCOUNTER — OFFICE VISIT (OUTPATIENT)
Dept: FAMILY MEDICINE CLINIC | Facility: CLINIC | Age: 49
End: 2019-07-24
Payer: COMMERCIAL

## 2019-07-24 ENCOUNTER — TELEPHONE (OUTPATIENT)
Dept: FAMILY MEDICINE CLINIC | Facility: CLINIC | Age: 49
End: 2019-07-24

## 2019-07-24 VITALS
TEMPERATURE: 99.1 F | WEIGHT: 204.6 LBS | SYSTOLIC BLOOD PRESSURE: 122 MMHG | DIASTOLIC BLOOD PRESSURE: 76 MMHG | HEART RATE: 90 BPM | OXYGEN SATURATION: 98 % | HEIGHT: 62 IN | BODY MASS INDEX: 37.65 KG/M2

## 2019-07-24 DIAGNOSIS — L25.9 CONTACT DERMATITIS, UNSPECIFIED CONTACT DERMATITIS TYPE, UNSPECIFIED TRIGGER: Primary | ICD-10-CM

## 2019-07-24 PROCEDURE — 99213 OFFICE O/P EST LOW 20 MIN: CPT | Performed by: FAMILY MEDICINE

## 2019-07-24 PROCEDURE — 1036F TOBACCO NON-USER: CPT | Performed by: FAMILY MEDICINE

## 2019-07-24 PROCEDURE — 96372 THER/PROPH/DIAG INJ SC/IM: CPT | Performed by: FAMILY MEDICINE

## 2019-07-24 PROCEDURE — 3008F BODY MASS INDEX DOCD: CPT | Performed by: FAMILY MEDICINE

## 2019-07-24 RX ORDER — METHYLPREDNISOLONE ACETATE 80 MG/ML
80 INJECTION, SUSPENSION INTRA-ARTICULAR; INTRALESIONAL; INTRAMUSCULAR; SOFT TISSUE ONCE
Status: COMPLETED | OUTPATIENT
Start: 2019-07-24 | End: 2019-07-24

## 2019-07-24 RX ORDER — PREDNISONE 10 MG/1
TABLET ORAL
Qty: 21 TABLET | Refills: 0 | Status: SHIPPED | OUTPATIENT
Start: 2019-07-24 | End: 2020-07-29 | Stop reason: ALTCHOICE

## 2019-07-24 RX ADMIN — METHYLPREDNISOLONE ACETATE 80 MG: 80 INJECTION, SUSPENSION INTRA-ARTICULAR; INTRALESIONAL; INTRAMUSCULAR; SOFT TISSUE at 17:36

## 2019-07-24 NOTE — TELEPHONE ENCOUNTER
I left a message for Kassie Arroyo that there has been a cancellation for this evening Today Wednesday 7/24/ at 4:45 pm with Dr Pabon I can not hold the appointment she is more than welcome to call and see if it is still available

## 2019-07-24 NOTE — PROGRESS NOTES
Assessment/Plan:  Chief Complaint   Patient presents with    Rash     has a rash on abdomen, chest no spreading to groin and buttocks  Began about a week ago  Her  had poison ivy  Patient Instructions   Here for rash on abdomen and also under breasts and also right groin area  Start prednisone 10 mg 6-5-4-3-2-1  Claritin 10 mg daily prn itch and rash  Depo medrol 80 mg injection today  Call if worse  No problem-specific Assessment & Plan notes found for this encounter  Diagnoses and all orders for this visit:    Contact dermatitis, unspecified contact dermatitis type, unspecified trigger  -     predniSONE 10 mg tablet; Take 60 mg po day#1, 50 mg po day#2, 40 mg po day#3, 30 mg po day#4, 20 mg po day#5m and 10 mg po day#6          Subjective:      Patient ID: Janessa Morrissey is a 52 y o  female  Rash (has a rash on abdomen, chest no spreading to groin and buttocks  Began about a week ago  Her  had poison ivy ) Used Nystatin powder but it did not help and Gold [place] did help but rash hurts and itches also  The following portions of the patient's history were reviewed and updated as appropriate: allergies, current medications, past family history, past medical history, past social history, past surgical history and problem list     Review of Systems   Constitutional: Negative  HENT: Negative  Eyes: Negative  Respiratory: Negative  Cardiovascular: Negative  Gastrointestinal: Negative  Endocrine: Negative  Genitourinary: Negative  Musculoskeletal: Negative  Skin: Positive for rash  Rash on abdomen and under breasts  Allergic/Immunologic: Negative  Neurological: Negative  Hematological: Negative  Psychiatric/Behavioral: Negative            Objective:      /76   Pulse 90   Temp 99 1 °F (37 3 °C)   Ht 5' 2" (1 575 m)   Wt 92 8 kg (204 lb 9 6 oz)   SpO2 98%   BMI 37 42 kg/m²          Physical Exam   Constitutional: She is oriented to person, place, and time  She appears well-developed and well-nourished  HENT:   Head: Normocephalic and atraumatic  Right Ear: External ear normal    Left Ear: External ear normal    Nose: Nose normal    Mouth/Throat: Oropharynx is clear and moist    Eyes: Pupils are equal, round, and reactive to light  Conjunctivae and EOM are normal    Neck: Normal range of motion  Neck supple  Cardiovascular: Normal rate, regular rhythm, normal heart sounds and intact distal pulses  Pulmonary/Chest: Effort normal and breath sounds normal    Musculoskeletal: Normal range of motion  Neurological: She is alert and oriented to person, place, and time  She has normal reflexes  Skin: Skin is warm and dry  Rash noted  Rash on abdomen and under breasts  Psychiatric: She has a normal mood and affect   Her behavior is normal

## 2019-07-24 NOTE — PATIENT INSTRUCTIONS
1  Candidal Intertrigo  - wash the area with soap and water daily, keep the skin clean and dry  - instructed to wear light breathable clothing   - Nystatin powder prescribed, use as directed   - follow up w/ PCP for in 5-7 days

## 2019-07-24 NOTE — PATIENT INSTRUCTIONS
Here for rash on abdomen and also under breasts and also right groin area  Start prednisone 10 mg 6-5-4-3-2-1  Claritin 10 mg daily prn itch and rash  Depo medrol 80 mg injection today  Call if worse

## 2019-07-24 NOTE — PROGRESS NOTES
[address] [facility]        NAME: Tres Norman is a 52 y o  female  : 1970    MRN: [id number]  DATE: 2019  TIME: 11:21 AM    Assessment and Plan   Candidal intertrigo [B37 2]  1  Candidal intertrigo  nystatin (MYCOSTATIN) powder         Patient Instructions     Patient Instructions   1  Candidal Intertrigo  - wash the area with soap and water daily, keep the skin clean and dry  - instructed to wear light breathable clothing   - Nystatin powder prescribed, use as directed   - follow up w/ PCP for in 5-7 days     Follow up with PCP in 5-7 days  Proceed to  ER if symptoms worsen  Video Visit - Tres Norman 52 y o  female MRN: [id number]    REQUIRED DOCUMENTATION:       There were no vitals filed for this visit  1  [facility]  2  Provider located at [address] [facility]  [phone]   3  [facility] provider: Marck Arnold MD   4  Identify all parties in room with patient during [facility] visit: only patient  5  After connecting through [employer], patient was identified by name and date of birth  Patient was then informed that this was a Telemedicine visit and that the exam was being conducted confidentially over secure lines  My office door was closed  Other methods to assure confidentiality were taken  No one else was in the room  and The following individuals were in the room with me and the patient informed  Patient acknowledged consent and understanding of privacy and security of the Telemedicine visit  I informed the patient that I have reviewed their record in Epic and presented the opportunity for them to ask any questions regarding the visit today  The patient agreed to participate          Chief Complaint     Chief Complaint   Patient presents with    Rash     rash under bilateral breasts          History of Present Illness       53 yo female presents via virtual visit c/o rash under bilateral breasts that has been present x 2 days  The rash is within the skin folds of the breasts  She describes the rash as red, itchy ,and bumpy  No oozing or drainage  No associated pain  No new clothing or bras  No new detergents or skin products  She has been applying Gold [place] powder with minimal relief  Review of Systems   Review of Systems   Constitutional: Negative  Skin: Positive for rash          As noted in HPI         Current Medications       Current Outpatient Medications:     brompheniramine-pseudoephedrine-DM 30-2-10 MG/5ML syrup, Take 5 mL by mouth 3 (three) times a day as needed for congestion or cough, Disp: 120 mL, Rfl: 0    ergocalciferol (ERGOCALCIFEROL) 94447 units capsule, Take 1 capsule (50,000 Units total) by mouth once a week, Disp: 12 capsule, Rfl: 0    Ibuprofen (ADVIL PO), Take by mouth as needed, Disp: , Rfl:     Multiple Vitamin (MULTIVITAMIN) tablet, Take 1 tablet by mouth daily, Disp: , Rfl:     nystatin (MYCOSTATIN) powder, Apply topically 2 (two) times a day, Disp: 60 g, Rfl: 0    phentermine 15 MG capsule, Take 1 capsule (15 mg total) by mouth every morning (Patient not taking: Reported on 3/11/2019), Disp: 30 capsule, Rfl: 0    Current Allergies     Allergies as of 07/18/2019    (No Known Allergies)            The following portions of the patient's history were reviewed and updated as appropriate: allergies, current medications, past family history, past medical history, past social history, past surgical history and problem list      Past Medical History:   Diagnosis Date    Depression     Sleep apnea        Past Surgical History:   Procedure Laterality Date    MOUTH SURGERY      ROOT CANAL      Apicoectomy; resolved; 07/26/17    WISDOM TOOTH EXTRACTION      18's       Family History   Problem Relation Age of Onset    Hypertension Mother    [facility] Rheum arthritis Mother     Cirrhosis Father     Leukemia Father         ALL    Diabetes Maternal Grandmother mellitus    Hypertension Maternal Grandmother         pulmonary    Cancer Maternal Grandfather         face/throat    Anxiety disorder Paternal Grandmother     Depression Paternal Grandmother     Dementia Paternal Grandmother     Glaucoma Paternal Grandmother     Hypertension Paternal Grandmother     Lung cancer Paternal Grandfather     Heart attack Paternal Grandfather          Medications have been verified  Objective   There were no vitals taken for this visit  Physical Exam     Physical Exam   Constitutional: She is oriented to person, place, and time  She appears well-developed and well-nourished  She is active and cooperative  Non-toxic appearance  She does not have a sickly appearance  She does not appear ill  No distress  Neurological: She is alert and oriented to person, place, and time  Skin: Rash noted  She is not diaphoretic  Exam is limited due to virtual visit  There is an erythematous rash present within the creases of the skin folds under bilateral breasts  Psychiatric: She has a normal mood and affect   Her behavior is normal  Judgment and thought content normal

## 2019-10-30 ENCOUNTER — TRANSCRIBE ORDERS (OUTPATIENT)
Dept: ADMINISTRATIVE | Facility: HOSPITAL | Age: 49
End: 2019-10-30

## 2019-10-30 DIAGNOSIS — Z12.31 SCREENING MAMMOGRAM FOR HIGH-RISK PATIENT: Primary | ICD-10-CM

## 2020-04-06 ENCOUNTER — TELEMEDICINE (OUTPATIENT)
Dept: FAMILY MEDICINE CLINIC | Facility: CLINIC | Age: 50
End: 2020-04-06
Payer: COMMERCIAL

## 2020-04-06 ENCOUNTER — DOCUMENTATION (OUTPATIENT)
Dept: URGENT CARE | Facility: MEDICAL CENTER | Age: 50
End: 2020-04-06

## 2020-04-06 ENCOUNTER — OFFICE VISIT (OUTPATIENT)
Dept: URGENT CARE | Facility: MEDICAL CENTER | Age: 50
End: 2020-04-06
Payer: COMMERCIAL

## 2020-04-06 VITALS — HEART RATE: 100 BPM | TEMPERATURE: 100.3 F | DIASTOLIC BLOOD PRESSURE: 76 MMHG | SYSTOLIC BLOOD PRESSURE: 122 MMHG

## 2020-04-06 DIAGNOSIS — Z20.828 EXPOSURE TO SARS-ASSOCIATED CORONAVIRUS: ICD-10-CM

## 2020-04-06 DIAGNOSIS — R06.00 DYSPNEA, UNSPECIFIED TYPE: ICD-10-CM

## 2020-04-06 DIAGNOSIS — Z20.828 EXPOSURE TO SARS-ASSOCIATED CORONAVIRUS: Primary | ICD-10-CM

## 2020-04-06 DIAGNOSIS — R05.9 COUGH: ICD-10-CM

## 2020-04-06 PROCEDURE — 99214 OFFICE O/P EST MOD 30 MIN: CPT | Performed by: PHYSICIAN ASSISTANT

## 2020-04-06 PROCEDURE — 87635 SARS-COV-2 COVID-19 AMP PRB: CPT | Performed by: PHYSICIAN ASSISTANT

## 2020-04-06 RX ORDER — BENZONATATE 100 MG/1
100-200 CAPSULE ORAL 3 TIMES DAILY PRN
Qty: 30 CAPSULE | Refills: 0 | Status: SHIPPED | OUTPATIENT
Start: 2020-04-06 | End: 2020-07-29 | Stop reason: ALTCHOICE

## 2020-04-06 RX ORDER — ALBUTEROL SULFATE 90 UG/1
2 AEROSOL, METERED RESPIRATORY (INHALATION) EVERY 6 HOURS PRN
Qty: 1 INHALER | Refills: 0 | Status: SHIPPED | OUTPATIENT
Start: 2020-04-06 | End: 2020-07-29 | Stop reason: ALTCHOICE

## 2020-04-06 RX ORDER — CLOBETASOL PROPIONATE 0.5 MG/G
CREAM TOPICAL AS NEEDED
COMMUNITY
Start: 2020-02-05 | End: 2020-09-02 | Stop reason: SDUPTHER

## 2020-04-07 ENCOUNTER — TELEMEDICINE (OUTPATIENT)
Dept: FAMILY MEDICINE CLINIC | Facility: CLINIC | Age: 50
End: 2020-04-07
Payer: COMMERCIAL

## 2020-04-07 VITALS — TEMPERATURE: 99.2 F | HEART RATE: 80 BPM

## 2020-04-07 DIAGNOSIS — B34.9 VIRAL SYNDROME: Primary | ICD-10-CM

## 2020-04-07 LAB
INPATIENT: NORMAL
SARS-COV-2 RNA SPEC QL NAA+PROBE: NOT DETECTED

## 2020-04-07 PROCEDURE — 99213 OFFICE O/P EST LOW 20 MIN: CPT | Performed by: PHYSICIAN ASSISTANT

## 2020-04-24 DIAGNOSIS — R06.00 DYSPNEA, UNSPECIFIED TYPE: ICD-10-CM

## 2020-07-29 ENCOUNTER — TELEMEDICINE (OUTPATIENT)
Dept: FAMILY MEDICINE CLINIC | Facility: CLINIC | Age: 50
End: 2020-07-29
Payer: COMMERCIAL

## 2020-07-29 DIAGNOSIS — Z20.828 EXPOSURE TO SARS-ASSOCIATED CORONAVIRUS: Primary | ICD-10-CM

## 2020-07-29 PROCEDURE — 99213 OFFICE O/P EST LOW 20 MIN: CPT | Performed by: INTERNAL MEDICINE

## 2020-07-29 RX ORDER — MAGNESIUM 30 MG
30 TABLET ORAL AS NEEDED
COMMUNITY

## 2020-07-29 NOTE — PROGRESS NOTES
COVID-19 Virtual Visit     Assessment/Plan:    Problem List Items Addressed This Visit     None      Visit Diagnoses     Exposure to SARS-associated coronavirus    -  Primary    Relevant Orders    Novel Coronavirus ([id number]), PCR [facility] - Collected at [facility] or [facility]        This virtual check-in was done via [employer]  Disposition:      I referred Sergio Landry to one of our centralized sites for a COVID-19 swab  She will be traveling to [place] and testing is required per the state  She is asymptomatic at this time  I spent 10 minutes with patient today in which greater than 50% of the time was spent in counseling/coordination of care regarding testing/follow up care  Encounter provider Tyler Porter DO    Provider located at [address] [place] [address] Dr [place] [phone]    Recent Visits  No visits were found meeting these conditions  Showing recent visits within past 7 days and meeting all other requirements     Today's Visits  Date Type Provider Dept   07/29/20 Telemedicine Tyler Porter, [address] [facility]   Showing today's visits and meeting all other requirements     Future Appointments  No visits were found meeting these conditions  Showing future appointments within next 150 days and meeting all other requirements        Patient agrees to participate in a virtual check in via telephone or video visit instead of presenting to the office to address urgent/immediate medical needs  Patient is aware this is a billable service  After connecting through [employer], the patient was identified by name and date of birth  Eduardo Quiñones was informed that this was a telemedicine visit and that the exam was being conducted confidentially over secure lines  My office door was closed  No one else was in the room  Eduardo Quiñones acknowledged consent and understanding of privacy and security of the telemedicine visit    I informed the patient that I have reviewed her record in [address] [place] [address] and presented the opportunity for her to ask any questions regarding the visit today  The patient agreed to participate  Subjective  Balwinder Vaughn is a 48 y o  female who is concerned about COVID-19  She reports no symptoms, requires screening  She has not experienced no symptoms, requires screening She has not had contact with a person who is under investigation for or who is positive for COVID-19 within the last 14 days  She has not been hospitalized recently for fever and/or lower respiratory symptoms  Past Medical History:   Diagnosis Date    Depression     Sleep apnea        Past Surgical History:   Procedure Laterality Date    MOUTH SURGERY      ROOT CANAL      Apicoectomy; resolved; 07/26/17    WISDOM TOOTH EXTRACTION      1980's       Current Outpatient Medications   Medication Sig Dispense Refill    magnesium 30 MG tablet Take 30 mg by mouth 2 (two) times a day      Multiple Vitamin (MULTIVITAMIN) tablet Take 1 tablet by mouth daily      VITAMIN D PO Take 5,000 Units by mouth daily      clobetasol (TEMOVATE) 0 05 % cream as needed      Ibuprofen (ADVIL PO) Take by mouth as needed       No current facility-administered medications for this visit  No Known Allergies    Review of Systems   Constitutional: Negative for chills and fever  Respiratory: Negative for cough, chest tightness and shortness of breath  Cardiovascular: Negative for chest pain  Gastrointestinal: Negative for abdominal pain, diarrhea, nausea and vomiting  Neurological: Negative for dizziness and headaches  Video Exam    There were no vitals filed for this visit  Jean Claude Roth appears healthy, alert, no distress, cooperative  Physical Exam   Constitutional: She is oriented to person, place, and time  She appears well-developed and well-nourished  No distress  Eyes: Conjunctivae and EOM are normal    Pulmonary/Chest: Effort normal  No stridor   No respiratory distress  Neurological: She is alert and oriented to person, place, and time  Skin: She is not diaphoretic  Psychiatric: She has a normal mood and affect  Her behavior is normal  Judgment and thought content normal         VIRTUAL VISIT DISCLAIMER    Kristi Kat acknowledges that she has consented to an online visit or consultation  She understands that the online visit is based solely on information provided by her, and that, in the absence of a face-to-face physical evaluation by the physician, the diagnosis she receives is both limited and provisional in terms of accuracy and completeness  This is not intended to replace a full medical face-to-face evaluation by the physician  Kristi Kat understands and accepts these terms

## 2020-08-18 DIAGNOSIS — Z11.9 SCREENING EXAMINATION FOR INFECTIOUS DISEASE: Primary | ICD-10-CM

## 2020-08-21 DIAGNOSIS — Z11.9 SCREENING EXAMINATION FOR INFECTIOUS DISEASE: ICD-10-CM

## 2020-08-21 PROCEDURE — U0003 INFECTIOUS AGENT DETECTION BY NUCLEIC ACID (DNA OR RNA); SEVERE ACUTE RESPIRATORY SYNDROME CORONAVIRUS 2 (SARS-COV-2) (CORONAVIRUS DISEASE [COVID-19]), AMPLIFIED PROBE TECHNIQUE, MAKING USE OF HIGH THROUGHPUT TECHNOLOGIES AS DESCRIBED BY CMS-2020-01-R: HCPCS | Performed by: FAMILY MEDICINE

## 2020-08-23 LAB — SARS-COV-2 RNA SPEC QL NAA+PROBE: NOT DETECTED

## 2020-09-01 NOTE — PROGRESS NOTES
ADULT ANNUAL [facility]    NAME: Merlin Bethea  AGE: 48 y o  SEX: female  : 1970     DATE: 9/3/2020     Assessment and Plan:     Problem List Items Addressed This Visit        Respiratory    Obstructive sleep apnea treated with continuous positive airway pressure (CPAP)     Patient states that she did not tolerate CPAP machine  We discussed the benefits of weight loss with sleep apnea  She will work on this currently and will monitor for improvement  Call with any concerns in the interim  Cardiovascular and Mediastinum    Spider vein, symptomatic     Notes itching but no pain  Encouraged continued use of compression stockings  Will continue to monitor  Relevant Medications    clobetasol (TEMOVATE) 0 05 % cream       Musculoskeletal and Integument    Eczema     States that she primarily experiences symptoms on her hands  Given refill on clobetasol cream to use rarely as needed  Will continue to monitor  Relevant Medications    clobetasol (TEMOVATE) 0 05 % cream       Other    Depression, major, recurrent (HCC)     Not currently active  She will continue off of medication and will continue to monitor  Call with concerns prior to next visit  Depression Screening Follow-up Plan: Patient's depression screening was positive with a PHQ-2 score of 0  Their PHQ-9 score was 2  Clinically patient does not have depression  No treatment is required  Obesity with body mass index 30 or greater     Encouraged to work on following healthy diet and starting to exercise regularly  Will continue to monitor  Call if any additional assistance is desired  BMI Counseling: Body mass index is 39 87 kg/m²  The BMI is above normal  Nutrition recommendations include decreasing overall calorie intake and 3-5 servings of fruits/vegetables daily   Exercise recommendations include exercising 3-5 times per week            Relevant Orders    CBC and differential    Comprehensive metabolic panel    Lipid Panel with Direct LDL reflex    TSH, 3rd generation with Free T4 reflex    Vertigo     Experiences intermittent symptoms  Given refill on meclizine to have on hand if needed  No further treatments desired currently  Vitamin D deficiency     Currently on 5000 units daily  Recheck labs as ordered today  Relevant Orders    Comprehensive metabolic panel    Vitamin D 25 hydroxy      Other Visit Diagnoses     Annual physical exam    -  Primary    Relevant Orders    CBC and differential    Comprehensive metabolic panel    Lipid Panel with Direct LDL reflex    TSH, 3rd generation with Free T4 reflex    Screening for colorectal cancer        Relevant Orders    Ambulatory referral to Gastroenterology          Immunizations and preventive care screenings were discussed with patient today  Appropriate education was printed on patient's after visit summary  Counseling:  · Exercise: the importance of regular exercise/physical activity was discussed  Recommend exercise 3-5 times per week for at least 30 minutes  Return in about 1 year (around 9/2/2021) for Annual physical      Chief Complaint:     Chief Complaint   Patient presents with    Physical Exam      History of Present Illness:     Adult Annual Physical   Patient here for a comprehensive physical exam  The patient reports problems - As below  The patient feels that sleep apnea has been untreated  She does not use her CPAP due to poor tolerance  The patient confirms non restorative sleep on a regular basis for as long as she can remember  The patient has a history of vitamin-D deficiency and is currently taking 5000 units daily  Last vitamin-D level was 19 2 about 2 years ago      Since the patent's last visit, weight has been variable - notes that she started Weight Watchers at the beginning of the year and lost 15-16 pounds but gained it back plus some in [facility]  She notes that she got down to 207 2 but then up to 223 pounds  Diet is reported to be improved over the last few days - trying [employer] and keto and intermittent fasting  Exercise occurs with occasional bike rides  She notes that she had trouble with dry mouth with phentermine  Her eczema/hand dermatitis has been fairly well controlled and reports only using clobetasol rarely  Diet and Physical Activity  · Diet/Nutrition: Recently improved as above  · Exercise: Occasional biking as above  Depression Screening  PHQ-9 Depression Screening    PHQ-9:    Frequency of the following problems over the past two weeks:       Little interest or pleasure in doing things:  0 - not at all  Feeling down, depressed, or hopeless:  0 - not at all  Trouble falling or staying asleep, or sleeping too much:  0 - not at all  Feeling tired or having little energy:  0 - not at all  Poor appetite or overeatin - several days  Feeling bad about yourself - or that you are a failure or have let yourself or your family down:  1 - several days  Trouble concentrating on things, such as reading the newspaper or watching television:  0 - not at all  Moving or speaking so slowly that other people could have noticed  Or the opposite - being so fidgety or restless that you have been moving around a lot more than usual:  0 - not at all  Thoughts that you would be better off dead, or of hurting yourself in some way:  0 - not at all  PHQ-2 Score:  0  PHQ-9 Score:  2       General Health  · Sleep: unrefreshing sleep  · Hearing: decreased - bilateral and notes that it happens with background noise, people wearing masks, or  talking while walking away  · Vision: most recent eye exam <1 year ago and wears glasses  · Dental: regular dental visits         /GYN Health  · Patient is: perimenopausal  · Last menstrual period: 2020 and last normal 5 days but previous one was        Review of Systems: Review of Systems   Constitutional: Negative for chills and fever  HENT: Negative for rhinorrhea and sore throat  Eyes: Negative for visual disturbance  Respiratory: Negative for cough, shortness of breath and wheezing  Cardiovascular: Negative for chest pain, palpitations and leg swelling  Gastrointestinal: Negative for abdominal pain, constipation, diarrhea, nausea and vomiting  Endocrine: Negative for polydipsia and polyuria  Genitourinary: Negative for dysuria  Musculoskeletal: Positive for arthralgias (shoulders and had left elbow tendonitis and right wrist pain recently that have improved)  Negative for myalgias  Skin: Negative for rash  Neurological: Positive for dizziness (has occasional vertigo)  Negative for syncope and headaches  Hematological: Does not bruise/bleed easily  Psychiatric/Behavioral: Negative for dysphoric mood  The patient is not nervous/anxious         Past Medical History:     Past Medical History:   Diagnosis Date    Depression     Sleep apnea       Past Surgical History:     Past Surgical History:   Procedure Laterality Date    MOUTH SURGERY      ROOT CANAL      Apicoectomy; resolved; 07/26/17    WISDOM TOOTH EXTRACTION      1980's      Social History:        Social History     Socioeconomic History    Marital status: /Civil Union     Spouse name: None    Number of children: None    Years of education: college student    Highest education level: None   Occupational History    Occupation: employed   Social Needs    Financial resource strain: None    Food insecurity     Worry: None     Inability: None    Transportation needs     Medical: None     Non-medical: None   Tobacco Use    Smoking status: Never Smoker    Smokeless tobacco: Never Used   Substance and Sexual Activity    Alcohol use: Yes     Comment: occassional    Drug use: Never    Sexual activity: Yes     Partners: Male     Birth control/protection: Male Sterilization Lifestyle    Physical activity     Days per week: None     Minutes per session: None    Stress: None   Relationships    Social connections     Talks on phone: None     Gets together: None     Attends Jainism service: None     Active member of club or organization: None     Attends meetings of clubs or organizations: None     Relationship status: None    Intimate partner violence     Fear of current or ex partner: None     Emotionally abused: None     Physically abused: None     Forced sexual activity: None   Other Topics Concern    None   Social History Narrative    Daily coffee consumption 2 cups a day    Household: Mother      Family History:     Family History   Problem Relation Age of Onset    Hypertension Mother    Cordelia Mare Rheum arthritis Mother     Diverticulosis Mother         plus diverticulitis    Cirrhosis Father     Leukemia Father         ALL    Diabetes Maternal Grandmother         mellitus    Hypertension Maternal Grandmother         pulmonary    Cancer Maternal Grandfather         face/throat    Anxiety disorder Paternal Grandmother     Depression Paternal Grandmother     Dementia Paternal Grandmother     Glaucoma Paternal Grandmother     Hypertension Paternal Grandmother     Lung cancer Paternal Grandfather     Heart attack Paternal Grandfather       Current Medications:     Current Outpatient Medications   Medication Sig Dispense Refill    clobetasol (TEMOVATE) 0 05 % cream Apply topically 2 (two) times a day 30 g 0    Ibuprofen (ADVIL PO) Take by mouth as needed      magnesium 30 MG tablet Take 30 mg by mouth 2 (two) times a day      Multiple Vitamin (MULTIVITAMIN) tablet Take 1 tablet by mouth daily      VITAMIN D PO Take 5,000 Units by mouth daily       No current facility-administered medications for this visit         Allergies:     No Known Allergies   Physical Exam:     /80 (BP Location: Left arm, Patient Position: Sitting, Cuff Size: Large)   Pulse 78   Temp 98 °F (36 7 °C)   Ht 5' 2" (1 575 m)   Wt 98 9 kg (218 lb)   SpO2 98%   BMI 39 87 kg/m²     Physical Exam  Vitals signs reviewed  Constitutional:       General: She is not in acute distress  Appearance: Normal appearance  She is well-developed  She is obese  She is not ill-appearing  HENT:      Head: Normocephalic and atraumatic  Right Ear: External ear normal       Left Ear: External ear normal       Nose: Nose normal       Mouth/Throat:      Pharynx: No oropharyngeal exudate  Eyes:      Conjunctiva/sclera: Conjunctivae normal       Pupils: Pupils are equal, round, and reactive to light  Neck:      Musculoskeletal: Normal range of motion and neck supple  Thyroid: No thyromegaly  Cardiovascular:      Rate and Rhythm: Normal rate and regular rhythm  Pulses: Normal pulses  Heart sounds: Normal heart sounds  No murmur  Pulmonary:      Effort: Pulmonary effort is normal       Breath sounds: Normal breath sounds  No wheezing or rales  Abdominal:      General: Bowel sounds are normal  There is no distension  Palpations: Abdomen is soft  There is no mass  Tenderness: There is no abdominal tenderness  Musculoskeletal:      Right lower leg: No edema  Left lower leg: No edema  Lymphadenopathy:      Cervical: No cervical adenopathy  Skin:     General: Skin is warm and dry  Findings: No rash  Neurological:      Mental Status: She is alert  Sensory: No sensory deficit     Psychiatric:         Mood and Affect: Mood normal          Behavior: Behavior normal           Tatiana Muir PA-C  [facility]

## 2020-09-02 ENCOUNTER — OFFICE VISIT (OUTPATIENT)
Dept: FAMILY MEDICINE CLINIC | Facility: CLINIC | Age: 50
End: 2020-09-02
Payer: COMMERCIAL

## 2020-09-02 VITALS
WEIGHT: 218 LBS | HEART RATE: 78 BPM | OXYGEN SATURATION: 98 % | HEIGHT: 62 IN | DIASTOLIC BLOOD PRESSURE: 80 MMHG | TEMPERATURE: 98 F | SYSTOLIC BLOOD PRESSURE: 106 MMHG | BODY MASS INDEX: 40.12 KG/M2

## 2020-09-02 DIAGNOSIS — I78.1 SPIDER VEIN, SYMPTOMATIC: ICD-10-CM

## 2020-09-02 DIAGNOSIS — Z12.11 SCREENING FOR COLORECTAL CANCER: ICD-10-CM

## 2020-09-02 DIAGNOSIS — E66.9 OBESITY WITH BODY MASS INDEX 30 OR GREATER: ICD-10-CM

## 2020-09-02 DIAGNOSIS — E55.9 VITAMIN D DEFICIENCY: ICD-10-CM

## 2020-09-02 DIAGNOSIS — R42 VERTIGO: ICD-10-CM

## 2020-09-02 DIAGNOSIS — G47.33 OBSTRUCTIVE SLEEP APNEA TREATED WITH CONTINUOUS POSITIVE AIRWAY PRESSURE (CPAP): ICD-10-CM

## 2020-09-02 DIAGNOSIS — Z00.00 ANNUAL PHYSICAL EXAM: Primary | ICD-10-CM

## 2020-09-02 DIAGNOSIS — F33.42 RECURRENT MAJOR DEPRESSIVE DISORDER, IN FULL REMISSION (HCC): ICD-10-CM

## 2020-09-02 DIAGNOSIS — Z12.12 SCREENING FOR COLORECTAL CANCER: ICD-10-CM

## 2020-09-02 DIAGNOSIS — Z99.89 OBSTRUCTIVE SLEEP APNEA TREATED WITH CONTINUOUS POSITIVE AIRWAY PRESSURE (CPAP): ICD-10-CM

## 2020-09-02 DIAGNOSIS — L30.9 ECZEMA, UNSPECIFIED TYPE: ICD-10-CM

## 2020-09-02 PROCEDURE — 99396 PREV VISIT EST AGE 40-64: CPT | Performed by: PHYSICIAN ASSISTANT

## 2020-09-02 RX ORDER — CLOBETASOL PROPIONATE 0.5 MG/G
CREAM TOPICAL 2 TIMES DAILY
Qty: 30 G | Refills: 0 | Status: SHIPPED | OUTPATIENT
Start: 2020-09-02

## 2020-09-02 NOTE — PATIENT INSTRUCTIONS

## 2020-09-03 NOTE — ASSESSMENT & PLAN NOTE
Patient states that she did not tolerate CPAP machine  We discussed the benefits of weight loss with sleep apnea  She will work on this currently and will monitor for improvement  Call with any concerns in the interim

## 2020-09-03 NOTE — ASSESSMENT & PLAN NOTE
Encouraged to work on following healthy diet and starting to exercise regularly  Will continue to monitor  Call if any additional assistance is desired  BMI Counseling: Body mass index is 39 87 kg/m²  The BMI is above normal  Nutrition recommendations include decreasing overall calorie intake and 3-5 servings of fruits/vegetables daily  Exercise recommendations include exercising 3-5 times per week

## 2020-09-03 NOTE — ASSESSMENT & PLAN NOTE
Not currently active  She will continue off of medication and will continue to monitor  Call with concerns prior to next visit  Depression Screening Follow-up Plan: Patient's depression screening was positive with a PHQ-2 score of 0  Their PHQ-9 score was 2  Clinically patient does not have depression  No treatment is required

## 2020-09-03 NOTE — ASSESSMENT & PLAN NOTE
States that she primarily experiences symptoms on her hands  Given refill on clobetasol cream to use rarely as needed  Will continue to monitor

## 2020-09-03 NOTE — ASSESSMENT & PLAN NOTE
Notes itching but no pain  Encouraged continued use of compression stockings  Will continue to monitor

## 2020-09-03 NOTE — ASSESSMENT & PLAN NOTE
Experiences intermittent symptoms  Given refill on meclizine to have on hand if needed  No further treatments desired currently

## 2020-09-16 ENCOUNTER — APPOINTMENT (OUTPATIENT)
Dept: LAB | Facility: CLINIC | Age: 50
End: 2020-09-16
Payer: COMMERCIAL

## 2020-09-16 ENCOUNTER — ANNUAL EXAM (OUTPATIENT)
Dept: OBGYN CLINIC | Facility: CLINIC | Age: 50
End: 2020-09-16
Payer: COMMERCIAL

## 2020-09-16 VITALS
HEIGHT: 62 IN | WEIGHT: 211.8 LBS | SYSTOLIC BLOOD PRESSURE: 122 MMHG | BODY MASS INDEX: 38.98 KG/M2 | DIASTOLIC BLOOD PRESSURE: 75 MMHG

## 2020-09-16 DIAGNOSIS — E66.9 OBESITY WITH BODY MASS INDEX 30 OR GREATER: ICD-10-CM

## 2020-09-16 DIAGNOSIS — Z00.00 ANNUAL PHYSICAL EXAM: ICD-10-CM

## 2020-09-16 DIAGNOSIS — E55.9 VITAMIN D DEFICIENCY: ICD-10-CM

## 2020-09-16 DIAGNOSIS — Z01.419 ENCOUNTER FOR GYNECOLOGICAL EXAMINATION: Primary | ICD-10-CM

## 2020-09-16 LAB
25(OH)D3 SERPL-MCNC: 38.9 NG/ML (ref 30–100)
ALBUMIN SERPL BCP-MCNC: 3.9 G/DL (ref 3.5–5)
ALP SERPL-CCNC: 67 U/L (ref 46–116)
ALT SERPL W P-5'-P-CCNC: 23 U/L (ref 12–78)
ANION GAP SERPL CALCULATED.3IONS-SCNC: 9 MMOL/L (ref 4–13)
AST SERPL W P-5'-P-CCNC: 13 U/L (ref 5–45)
BASOPHILS # BLD AUTO: 0.02 THOUSANDS/ΜL (ref 0–0.1)
BASOPHILS NFR BLD AUTO: 0 % (ref 0–1)
BILIRUB SERPL-MCNC: 0.46 MG/DL (ref 0.2–1)
BUN SERPL-MCNC: 9 MG/DL (ref 5–25)
CALCIUM SERPL-MCNC: 9.2 MG/DL (ref 8.3–10.1)
CHLORIDE SERPL-SCNC: 105 MMOL/L (ref 100–108)
CHOLEST SERPL-MCNC: 162 MG/DL (ref 50–200)
CO2 SERPL-SCNC: 25 MMOL/L (ref 21–32)
CREAT SERPL-MCNC: 0.75 MG/DL (ref 0.6–1.3)
EOSINOPHIL # BLD AUTO: 0.04 THOUSAND/ΜL (ref 0–0.61)
EOSINOPHIL NFR BLD AUTO: 1 % (ref 0–6)
ERYTHROCYTE [DISTWIDTH] IN BLOOD BY AUTOMATED COUNT: 13.7 % (ref 11.6–15.1)
GFR SERPL CREATININE-BSD FRML MDRD: 93 ML/MIN/1.73SQ M
GLUCOSE P FAST SERPL-MCNC: 88 MG/DL (ref 65–99)
HCT VFR BLD AUTO: 41.8 % (ref 34.8–46.1)
HDLC SERPL-MCNC: 48 MG/DL
HGB BLD-MCNC: 14.3 G/DL (ref 11.5–15.4)
IMM GRANULOCYTES # BLD AUTO: 0.01 THOUSAND/UL (ref 0–0.2)
IMM GRANULOCYTES NFR BLD AUTO: 0 % (ref 0–2)
LDLC SERPL CALC-MCNC: 97 MG/DL (ref 0–100)
LYMPHOCYTES # BLD AUTO: 1.72 THOUSANDS/ΜL (ref 0.6–4.47)
LYMPHOCYTES NFR BLD AUTO: 35 % (ref 14–44)
MCH RBC QN AUTO: 31 PG (ref 26.8–34.3)
MCHC RBC AUTO-ENTMCNC: 34.2 G/DL (ref 31.4–37.4)
MCV RBC AUTO: 91 FL (ref 82–98)
MONOCYTES # BLD AUTO: 0.34 THOUSAND/ΜL (ref 0.17–1.22)
MONOCYTES NFR BLD AUTO: 7 % (ref 4–12)
NEUTROPHILS # BLD AUTO: 2.83 THOUSANDS/ΜL (ref 1.85–7.62)
NEUTS SEG NFR BLD AUTO: 57 % (ref 43–75)
NRBC BLD AUTO-RTO: 0 /100 WBCS
PLATELET # BLD AUTO: 214 THOUSANDS/UL (ref 149–390)
PMV BLD AUTO: 10.5 FL (ref 8.9–12.7)
POTASSIUM SERPL-SCNC: 4 MMOL/L (ref 3.5–5.3)
PROT SERPL-MCNC: 7.5 G/DL (ref 6.4–8.2)
RBC # BLD AUTO: 4.62 MILLION/UL (ref 3.81–5.12)
SODIUM SERPL-SCNC: 139 MMOL/L (ref 136–145)
TRIGL SERPL-MCNC: 84 MG/DL
TSH SERPL DL<=0.05 MIU/L-ACNC: 2.25 UIU/ML (ref 0.36–3.74)
WBC # BLD AUTO: 4.96 THOUSAND/UL (ref 4.31–10.16)

## 2020-09-16 PROCEDURE — 80061 LIPID PANEL: CPT

## 2020-09-16 PROCEDURE — 85025 COMPLETE CBC W/AUTO DIFF WBC: CPT

## 2020-09-16 PROCEDURE — 99396 PREV VISIT EST AGE 40-64: CPT | Performed by: OBSTETRICS & GYNECOLOGY

## 2020-09-16 PROCEDURE — 84443 ASSAY THYROID STIM HORMONE: CPT

## 2020-09-16 PROCEDURE — 82306 VITAMIN D 25 HYDROXY: CPT

## 2020-09-16 PROCEDURE — 36415 COLL VENOUS BLD VENIPUNCTURE: CPT

## 2020-09-16 PROCEDURE — 80053 COMPREHEN METABOLIC PANEL: CPT

## 2020-09-16 NOTE — PROGRESS NOTES
ASSESSMENT & PLAN: Faizan García is a 48 y o  [id number] with normal gynecologic exam     1   Routine well woman exam done today  2  Pap and HPV:  The patient's last pap and hpv was   It was normal     Pap with cotesting was not done today  Current ASCCP Guidelines reviewed  3   Mammogram ordered  4  Colorectal cancer screening was not ordered  - has order from PCP   5  The following were reviewed in today's visit: breast self exam, mammography screening ordered, menopause, exercise and healthy diet      CC:  Annual Gynecologic Examination    HPI: Faizan García is a 48 y o  [id number] who presents for annual gynecologic examination  She has the following concerns:  None     Health Maintenance:    She wears her seatbelt routinely  She does perform regular monthly self breast exams  She feels safe at home         Past Medical History:   Diagnosis Date    Depression     Sleep apnea        Past Surgical History:   Procedure Laterality Date    MOUTH SURGERY      ROOT CANAL      Apicoectomy; resolved; 17    WISDOM TOOTH EXTRACTION             Past OB/Gyn History:  OB History        3    Para   3    Term   3            AB        Living   3       SAB        TAB        Ectopic        Multiple        Live Births   3                 Family History   Problem Relation Age of Onset    Hypertension Mother    Sylvia Pall Rheum arthritis Mother     Diverticulosis Mother         plus diverticulitis    Cirrhosis Father     Leukemia Father         ALL    Diabetes Maternal Grandmother         mellitus    Hypertension Maternal Grandmother         pulmonary    Cancer Maternal Grandfather         face/throat    Anxiety disorder Paternal Grandmother     Depression Paternal Grandmother     Dementia Paternal Grandmother     Glaucoma Paternal Grandmother     Hypertension Paternal Grandmother     Lung cancer Paternal Grandfather     Heart attack Paternal Grandfather        Social History:  Social History     Socioeconomic History    Marital status: /Civil Union     Spouse name: Not on file    Number of children: Not on file    Years of education: college student    Highest education level: Not on file   Occupational History    Occupation: employed   Social Needs    Financial resource strain: Not on file    Food insecurity     Worry: Not on file     Inability: Not on file   [employer] needs     Medical: Not on file     Non-medical: Not on file   Tobacco Use    Smoking status: Never Smoker    Smokeless tobacco: Never Used   Substance and Sexual Activity    Alcohol use: Yes     Frequency: Monthly or less     Comment: occassional    Drug use: Never    Sexual activity: Yes     Partners: Male     Birth control/protection: Male Sterilization, None   Lifestyle    Physical activity     Days per week: Not on file     Minutes per session: Not on file    Stress: Not on file   Relationships    Social connections     Talks on phone: Not on file     Gets together: Not on file     Attends Mandaen service: Not on file     Active member of club or organization: Not on file     Attends meetings of clubs or organizations: Not on file     Relationship status: Not on file    Intimate partner violence     Fear of current or ex partner: Not on file     Emotionally abused: Not on file     Physically abused: Not on file     Forced sexual activity: Not on file   Other Topics Concern    Not on file   Social History Narrative    Daily coffee consumption 2 cups a day    Household:  Mother   No Known Allergies    Current Outpatient Medications:     clobetasol (TEMOVATE) 0 05 % cream, Apply topically 2 (two) times a day, Disp: 30 g, Rfl: 0    Ibuprofen (ADVIL PO), Take by mouth as needed, Disp: , Rfl:     magnesium 30 MG tablet, Take 30 mg by mouth 2 (two) times a day, Disp: , Rfl:     Multiple Vitamin (MULTIVITAMIN) tablet, Take 1 tablet by mouth daily, Disp: , Rfl:     VITAMIN D PO, Take 5,000 Units by mouth daily, Disp: , Rfl:       Review of Systems  Constitutional :no fever, feels well, no tiredness, no recent weight gain or loss  ENT: no ear ache, no loss of hearing, no nosebleeds or nasal discharge, no sore throat or hoarseness  Cardiovascular: no complaints of slow or fast heart beat, no chest pain, no palpitations, no leg claudication or lower extremity edema  Respiratory: no complaints of shortness of shortness of breath, no BERNAL  Breasts:no complaints of breast pain, breast lump, or nipple discharge  Gastrointestinal: no complaints of abdominal pain, constipation, nausea, vomiting, or diarrhea or bloody stools  Genitourinary : no complaints of dysuria, incontinence, pelvic pain, no dysmenorrhea, vaginal discharge or abnormal vaginal bleeding and as noted in HPI  Musculoskeletal: no complaints of arthralgia, no myalgia, no joint swelling or stiffness, no limb pain or swelling  Integumentary: no complaints of skin rash or lesion, itching or dry skin  Neurological: no complaints of headache, no confusion, no numbness or tingling, no dizziness or fainting    Objective      /75   Ht 5' 2" (1 575 m)   Wt 96 1 kg (211 lb 12 8 oz)   LMP 09/15/2020   BMI 38 74 kg/m²     General:   appears stated age, cooperative, alert normal mood and affect   Lungs: Unlabored breathing    Breasts: normal appearance, no masses or tenderness   Abdomen: soft, non-tender, without masses or organomegaly   Vulva: normal   Vagina: normal vagina, no discharge, exudate, lesion, or erythema   Urethra: normal   Cervix: Normal, no discharge  Nontender  Uterus: normal size, contour, position, consistency, mobility, non-tender   Adnexa: no mass, fullness, tenderness   Lymphatic palpation of lymph nodes in neck, axilla, groin and/or other locations: no lymphadenopathy or masses noted   Skin normal skin turgor and no rashes     Psychiatric orientation to person, place, and time: normal  mood and affect: normal

## 2020-10-01 DIAGNOSIS — Z11.9 SCREENING EXAMINATION FOR INFECTIOUS DISEASE: Primary | ICD-10-CM

## 2020-10-05 DIAGNOSIS — Z11.9 SCREENING EXAMINATION FOR INFECTIOUS DISEASE: ICD-10-CM

## 2020-10-05 PROCEDURE — U0003 INFECTIOUS AGENT DETECTION BY NUCLEIC ACID (DNA OR RNA); SEVERE ACUTE RESPIRATORY SYNDROME CORONAVIRUS 2 (SARS-COV-2) (CORONAVIRUS DISEASE [COVID-19]), AMPLIFIED PROBE TECHNIQUE, MAKING USE OF HIGH THROUGHPUT TECHNOLOGIES AS DESCRIBED BY CMS-2020-01-R: HCPCS | Performed by: FAMILY MEDICINE

## 2020-10-06 LAB — SARS-COV-2 RNA SPEC QL NAA+PROBE: NOT DETECTED

## 2020-10-12 DIAGNOSIS — Z11.9 SCREENING EXAMINATION FOR INFECTIOUS DISEASE: Primary | ICD-10-CM

## 2020-10-14 DIAGNOSIS — Z11.9 SCREENING EXAMINATION FOR INFECTIOUS DISEASE: ICD-10-CM

## 2020-10-14 PROCEDURE — U0003 INFECTIOUS AGENT DETECTION BY NUCLEIC ACID (DNA OR RNA); SEVERE ACUTE RESPIRATORY SYNDROME CORONAVIRUS 2 (SARS-COV-2) (CORONAVIRUS DISEASE [COVID-19]), AMPLIFIED PROBE TECHNIQUE, MAKING USE OF HIGH THROUGHPUT TECHNOLOGIES AS DESCRIBED BY CMS-2020-01-R: HCPCS | Performed by: FAMILY MEDICINE

## 2020-10-15 LAB — SARS-COV-2 RNA SPEC QL NAA+PROBE: NOT DETECTED

## 2020-10-19 DIAGNOSIS — Z11.9 SCREENING EXAMINATION FOR INFECTIOUS DISEASE: Primary | ICD-10-CM

## 2020-10-20 DIAGNOSIS — Z11.9 SCREENING EXAMINATION FOR INFECTIOUS DISEASE: ICD-10-CM

## 2020-10-20 PROCEDURE — U0003 INFECTIOUS AGENT DETECTION BY NUCLEIC ACID (DNA OR RNA); SEVERE ACUTE RESPIRATORY SYNDROME CORONAVIRUS 2 (SARS-COV-2) (CORONAVIRUS DISEASE [COVID-19]), AMPLIFIED PROBE TECHNIQUE, MAKING USE OF HIGH THROUGHPUT TECHNOLOGIES AS DESCRIBED BY CMS-2020-01-R: HCPCS | Performed by: FAMILY MEDICINE

## 2020-10-21 DIAGNOSIS — Z11.9 SCREENING EXAMINATION FOR INFECTIOUS DISEASE: Primary | ICD-10-CM

## 2020-10-21 LAB — SARS-COV-2 RNA SPEC QL NAA+PROBE: NOT DETECTED

## 2020-10-27 DIAGNOSIS — Z11.9 SCREENING EXAMINATION FOR INFECTIOUS DISEASE: ICD-10-CM

## 2020-10-27 PROCEDURE — U0003 INFECTIOUS AGENT DETECTION BY NUCLEIC ACID (DNA OR RNA); SEVERE ACUTE RESPIRATORY SYNDROME CORONAVIRUS 2 (SARS-COV-2) (CORONAVIRUS DISEASE [COVID-19]), AMPLIFIED PROBE TECHNIQUE, MAKING USE OF HIGH THROUGHPUT TECHNOLOGIES AS DESCRIBED BY CMS-2020-01-R: HCPCS | Performed by: FAMILY MEDICINE

## 2020-10-28 LAB — SARS-COV-2 RNA SPEC QL NAA+PROBE: NOT DETECTED

## 2020-11-03 DIAGNOSIS — Z11.9 SCREENING EXAMINATION FOR INFECTIOUS DISEASE: ICD-10-CM

## 2020-11-03 PROCEDURE — U0003 INFECTIOUS AGENT DETECTION BY NUCLEIC ACID (DNA OR RNA); SEVERE ACUTE RESPIRATORY SYNDROME CORONAVIRUS 2 (SARS-COV-2) (CORONAVIRUS DISEASE [COVID-19]), AMPLIFIED PROBE TECHNIQUE, MAKING USE OF HIGH THROUGHPUT TECHNOLOGIES AS DESCRIBED BY CMS-2020-01-R: HCPCS | Performed by: FAMILY MEDICINE

## 2020-11-05 LAB — SARS-COV-2 RNA SPEC QL NAA+PROBE: NOT DETECTED

## 2020-11-17 DIAGNOSIS — Z11.9 SCREENING EXAMINATION FOR INFECTIOUS DISEASE: ICD-10-CM

## 2020-11-17 PROCEDURE — U0003 INFECTIOUS AGENT DETECTION BY NUCLEIC ACID (DNA OR RNA); SEVERE ACUTE RESPIRATORY SYNDROME CORONAVIRUS 2 (SARS-COV-2) (CORONAVIRUS DISEASE [COVID-19]), AMPLIFIED PROBE TECHNIQUE, MAKING USE OF HIGH THROUGHPUT TECHNOLOGIES AS DESCRIBED BY CMS-2020-01-R: HCPCS | Performed by: FAMILY MEDICINE

## 2020-11-18 LAB — SARS-COV-2 RNA SPEC QL NAA+PROBE: NOT DETECTED

## 2020-11-24 DIAGNOSIS — Z11.9 SCREENING EXAMINATION FOR INFECTIOUS DISEASE: ICD-10-CM

## 2020-11-24 PROCEDURE — U0003 INFECTIOUS AGENT DETECTION BY NUCLEIC ACID (DNA OR RNA); SEVERE ACUTE RESPIRATORY SYNDROME CORONAVIRUS 2 (SARS-COV-2) (CORONAVIRUS DISEASE [COVID-19]), AMPLIFIED PROBE TECHNIQUE, MAKING USE OF HIGH THROUGHPUT TECHNOLOGIES AS DESCRIBED BY CMS-2020-01-R: HCPCS | Performed by: FAMILY MEDICINE

## 2020-11-25 LAB — SARS-COV-2 RNA SPEC QL NAA+PROBE: NOT DETECTED

## 2020-12-02 DIAGNOSIS — Z11.9 SCREENING EXAMINATION FOR INFECTIOUS DISEASE: ICD-10-CM

## 2020-12-02 PROCEDURE — U0003 INFECTIOUS AGENT DETECTION BY NUCLEIC ACID (DNA OR RNA); SEVERE ACUTE RESPIRATORY SYNDROME CORONAVIRUS 2 (SARS-COV-2) (CORONAVIRUS DISEASE [COVID-19]), AMPLIFIED PROBE TECHNIQUE, MAKING USE OF HIGH THROUGHPUT TECHNOLOGIES AS DESCRIBED BY CMS-2020-01-R: HCPCS | Performed by: FAMILY MEDICINE

## 2020-12-03 LAB — SARS-COV-2 RNA SPEC QL NAA+PROBE: NOT DETECTED

## 2020-12-08 PROCEDURE — U0003 INFECTIOUS AGENT DETECTION BY NUCLEIC ACID (DNA OR RNA); SEVERE ACUTE RESPIRATORY SYNDROME CORONAVIRUS 2 (SARS-COV-2) (CORONAVIRUS DISEASE [COVID-19]), AMPLIFIED PROBE TECHNIQUE, MAKING USE OF HIGH THROUGHPUT TECHNOLOGIES AS DESCRIBED BY CMS-2020-01-R: HCPCS | Performed by: FAMILY MEDICINE

## 2020-12-09 LAB — SARS-COV-2 RNA SPEC QL NAA+PROBE: NOT DETECTED

## 2020-12-11 ENCOUNTER — ANESTHESIA EVENT (OUTPATIENT)
Dept: GASTROENTEROLOGY | Facility: MEDICAL CENTER | Age: 50
End: 2020-12-11

## 2020-12-11 ENCOUNTER — HOSPITAL ENCOUNTER (OUTPATIENT)
Dept: MAMMOGRAPHY | Facility: MEDICAL CENTER | Age: 50
Discharge: HOME | End: 2020-12-11
Payer: COMMERCIAL

## 2020-12-11 VITALS — HEIGHT: 62 IN | WEIGHT: 211 LBS | BODY MASS INDEX: 38.83 KG/M2

## 2020-12-11 DIAGNOSIS — Z12.31 SCREENING MAMMOGRAM FOR HIGH-RISK PATIENT: ICD-10-CM

## 2020-12-11 PROCEDURE — 77067 SCR MAMMO BI INCL CAD: CPT

## 2020-12-11 PROCEDURE — 77063 BREAST TOMOSYNTHESIS BI: CPT

## 2020-12-18 ENCOUNTER — IMMUNIZATIONS (OUTPATIENT)
Dept: FAMILY MEDICINE CLINIC | Facility: HOSPITAL | Age: 50
End: 2020-12-18
Payer: COMMERCIAL

## 2020-12-18 DIAGNOSIS — Z23 ENCOUNTER FOR IMMUNIZATION: ICD-10-CM

## 2020-12-18 PROCEDURE — 0001A SARS-COV-2 / COVID-19 MRNA VACCINE (PFIZER-BIONTECH) 30 MCG: CPT

## 2020-12-18 PROCEDURE — 91300 SARS-COV-2 / COVID-19 MRNA VACCINE (PFIZER-BIONTECH) 30 MCG: CPT

## 2021-01-08 ENCOUNTER — IMMUNIZATIONS (OUTPATIENT)
Dept: FAMILY MEDICINE CLINIC | Facility: HOSPITAL | Age: 51
End: 2021-01-08

## 2021-01-08 DIAGNOSIS — Z23 ENCOUNTER FOR IMMUNIZATION: ICD-10-CM

## 2021-01-08 PROCEDURE — 91300 SARS-COV-2 / COVID-19 MRNA VACCINE (PFIZER-BIONTECH) 30 MCG: CPT

## 2021-01-08 PROCEDURE — 0002A SARS-COV-2 / COVID-19 MRNA VACCINE (PFIZER-BIONTECH) 30 MCG: CPT

## 2021-01-28 ENCOUNTER — ANESTHESIA (OUTPATIENT)
Dept: GASTROENTEROLOGY | Facility: MEDICAL CENTER | Age: 51
End: 2021-01-28

## 2021-01-28 ENCOUNTER — HOSPITAL ENCOUNTER (OUTPATIENT)
Dept: GASTROENTEROLOGY | Facility: MEDICAL CENTER | Age: 51
Setting detail: OUTPATIENT SURGERY
Discharge: HOME | End: 2021-01-28
Payer: COMMERCIAL

## 2021-01-28 VITALS
TEMPERATURE: 97.7 F | RESPIRATION RATE: 18 BRPM | BODY MASS INDEX: 38.83 KG/M2 | OXYGEN SATURATION: 100 % | SYSTOLIC BLOOD PRESSURE: 108 MMHG | DIASTOLIC BLOOD PRESSURE: 61 MMHG | HEART RATE: 68 BPM | HEIGHT: 62 IN | WEIGHT: 211 LBS

## 2021-01-28 VITALS — HEART RATE: 78 BPM

## 2021-01-28 DIAGNOSIS — Z12.11 SCREENING FOR COLON CANCER: ICD-10-CM

## 2021-01-28 PROCEDURE — 88305 TISSUE EXAM BY PATHOLOGIST: CPT | Performed by: PATHOLOGY

## 2021-01-28 PROCEDURE — 45385 COLONOSCOPY W/LESION REMOVAL: CPT | Performed by: INTERNAL MEDICINE

## 2021-01-28 RX ORDER — SODIUM CHLORIDE 9 MG/ML
125 INJECTION, SOLUTION INTRAVENOUS CONTINUOUS
Status: DISCONTINUED | OUTPATIENT
Start: 2021-01-28 | End: 2021-02-01 | Stop reason: HOSPADM

## 2021-01-28 RX ORDER — PROPOFOL 10 MG/ML
INJECTION, EMULSION INTRAVENOUS AS NEEDED
Status: DISCONTINUED | OUTPATIENT
Start: 2021-01-28 | End: 2021-01-28

## 2021-01-28 RX ADMIN — PROPOFOL 50 MG: 10 INJECTION, EMULSION INTRAVENOUS at 13:13

## 2021-01-28 RX ADMIN — PROPOFOL 100 MG: 10 INJECTION, EMULSION INTRAVENOUS at 13:08

## 2021-01-28 RX ADMIN — PROPOFOL 50 MG: 10 INJECTION, EMULSION INTRAVENOUS at 13:10

## 2021-01-28 RX ADMIN — PROPOFOL 50 MG: 10 INJECTION, EMULSION INTRAVENOUS at 13:19

## 2021-01-28 RX ADMIN — PROPOFOL 50 MG: 10 INJECTION, EMULSION INTRAVENOUS at 13:16

## 2021-01-28 RX ADMIN — SODIUM CHLORIDE 125 ML/HR: 0.9 INJECTION, SOLUTION INTRAVENOUS at 13:01

## 2021-01-28 NOTE — ANESTHESIA POSTPROCEDURE EVALUATION
Post-Op Assessment Note    CV Status:  Stable  Pain Score: 0    Pain management: adequate     Mental Status:  Alert and awake   Hydration Status:  Euvolemic and stable   PONV Controlled:  Controlled   Airway Patency:  Patent      Post Op Vitals Reviewed: Yes      Staff: Anesthesiologist         No complications documented      /61 (01/28/21 1345)    Temp      Pulse 68 (01/28/21 1345)   Resp 18 (01/28/21 1345)    SpO2 100 % (01/28/21 1345)

## 2021-01-28 NOTE — DISCHARGE INSTRUCTIONS
Colonoscopy   WHAT YOU NEED TO KNOW:   A colonoscopy is a procedure to examine the inside of your colon (intestine) with a scope  Polyps or tissue growths may have been removed during your colonoscopy  It is normal to feel bloated and to have some abdominal discomfort  You should be passing gas  If you have hemorrhoids or you had polyps removed, you may have a small amount of bleeding  DISCHARGE INSTRUCTIONS:   Seek care immediately if:   · You have a large amount of bright red blood in your bowel movements  · Your abdomen is hard and firm and you have severe pain  · You have sudden trouble breathing  Contact your healthcare provider if:   · You develop a rash or hives  · You have a fever within 24 hours of your procedure       · You have not had a bowel movement for 3 days after your procedure  · You have questions or concerns about your condition or care  Activity:   ·     · Rest after your procedure  You have been given medicine to relax you  Do not  drive or make important decisions until the day after your procedure  Return to your normal activity as directed  · Relieve gas and discomfort from bloating  by lying on your right side with a heating pad on your abdomen  You may need to take short walks to help the gas move out  Eat small meals until bloating is relieved  If you had polyps removed: For 7 days after your procedure:  · Do not  take aspirin  Follow up with your healthcare provider as directed:  Write down your questions so you remember to ask them during your visits  © 2017 [address] is for End User's use only and may not be sold, redistributed or otherwise used for commercial purposes  All illustrations and images included in CareNotes® are the copyrighted property of A D A M , Inc  or Koko Bowser  The above information is an  only  It is not intended as medical advice for individual conditions or treatments  Talk to your doctor, nurse or pharmacist before following any medical regimen to see if it is safe and effective for you  Colorectal Polyps   WHAT YOU NEED TO KNOW:   Colorectal polyps are small growths of tissue in the lining of the colon and rectum  Most polyps are hyperplastic polyps and are usually benign (noncancerous)  Certain types of polyps, called adenomatous polyps, may turn into cancer  DISCHARGE INSTRUCTIONS:   Follow up with your healthcare provider or gastroenterologist as directed: You may need to return for more tests, such as another colonoscopy  Write down your questions so you remember to ask them during your visits  Reduce your risk for colorectal polyps:   · Eat a variety of healthy foods:  Healthy foods include fruit, vegetables, whole-grain breads, low-fat dairy products, beans, lean meat, and fish  Ask if you need to be on a special diet  · Maintain a healthy weight:  Ask your healthcare provider if you need to lose weight and how much you need to lose  Ask for help with a weight loss program     · Exercise:  Begin to exercise slowly and do more as you get stronger  Talk with your healthcare provider before you start an exercise program      · Limit alcohol:  Your risk for polyps increases the more you drink  · Do not smoke: If you smoke, it is never too late to quit  Ask for information about how to stop  For support and more information:   · Jim Green ([facility])  [place], [place] [address]  Phone: [phone] - [phone]  Web Address: www digestive  niddk nih gov    Contact your healthcare provider or gastroenterologist if:   · You have a fever  · You have chills, a cough, or feel weak and achy  · You have abdominal pain that does not go away or gets worse after you take medicine  · Your abdomen is swollen  · You are losing weight without trying      · You have questions or concerns about your condition or care     Seek care immediately or call 911 if:   · You have sudden shortness of breath  · You have a fast heart rate, fast breathing, or are too dizzy to stand up  · You have severe abdominal pain  · You see blood in your bowel movement  © Copyright [address] Information is for End User's use only and may not be sold, redistributed or otherwise used for commercial purposes  All illustrations and images included in CareNotes® are the copyrighted property of A RAMILA A [employer]  or [address] [place] [address]  The above information is an  only  It is not intended as medical advice for individual conditions or treatments  Talk to your doctor, nurse or pharmacist before following any medical regimen to see if it is safe and effective for you

## 2021-01-28 NOTE — H&P
History and Physical -  [facility]  Sean Snyder 48 y o  female MRN: [id number]                  HPI: Sean Snyder is a 48y o  year old female who presents for screening colonoscopy  REVIEW OF SYSTEMS: Per the HPI, and otherwise unremarkable  Historical Information   Past Medical History:   Diagnosis Date    Depression     Sleep apnea      Past Surgical History:   Procedure Laterality Date    MOUTH SURGERY      ROOT CANAL      Apicoectomy; resolved; 07/26/17    WISDOM TOOTH EXTRACTION      1980's     Social History   Social History     Substance and Sexual Activity   Alcohol Use Yes    Frequency: Monthly or less    Comment: occassional     Social History     Substance and Sexual Activity   Drug Use Never     Social History     Tobacco Use   Smoking Status Never Smoker   Smokeless Tobacco Never Used     Family History   Problem Relation Age of Onset    Hypertension Mother     Rheum arthritis Mother     Diverticulosis Mother         plus diverticulitis    Cirrhosis Father     Leukemia Father 61        ALL    Diabetes Maternal Grandmother         mellitus    Hypertension Maternal Grandmother         pulmonary    Cancer Maternal Grandfather 48        face/throat    Anxiety disorder Paternal Grandmother     Depression Paternal Grandmother     Dementia Paternal Grandmother     Glaucoma Paternal Grandmother     Hypertension Paternal Grandmother     Lung cancer Paternal Grandfather 80    Heart attack Paternal Grandfather     No Known Problems Sister     No Known Problems Daughter        Meds/Allergies       Current Outpatient Medications:     Ibuprofen (ADVIL PO)    VITAMIN D PO    clobetasol (TEMOVATE) 0 05 % cream    magnesium 30 MG tablet    Multiple Vitamin (MULTIVITAMIN) tablet    No Known Allergies    Objective     There were no vitals taken for this visit        PHYSICAL EXAM    Gen: NAD  CV: RRR  CHEST: Clear  ABD: soft, NT/ND  EXT: no edema      ASSESSMENT/PLAN: This is a 48y o  year old female here for index screening colonoscopy, and she is stable and optimized for her procedure

## 2021-01-28 NOTE — ANESTHESIA PREPROCEDURE EVALUATION
Procedure:  COLONOSCOPY    Relevant Problems   NEURO/PSYCH   (+) Depression, major, recurrent (HCC)      PULMONARY   (+) Obstructive sleep apnea treated with continuous positive airway pressure (CPAP)      Other   (+) Obesity with body mass index 30 or greater        Physical Exam    Airway    Mallampati score: III  TM Distance: >3 FB  Neck ROM: full     Dental       Cardiovascular  Rhythm: regular, Rate: normal, Cardiovascular exam normal    Pulmonary  Pulmonary exam normal Breath sounds clear to auscultation,     Other Findings        Anesthesia Plan  ASA Score- 3     Anesthesia Type- IV sedation with anesthesia with ASA Monitors  Additional Monitors:   Airway Plan:           Plan Factors-    Chart reviewed  Induction- intravenous  Postoperative Plan-     Informed Consent- Anesthetic plan and risks discussed with patient

## 2021-04-29 ENCOUNTER — APPOINTMENT (OUTPATIENT)
Dept: LAB | Facility: MEDICAL CENTER | Age: 51
End: 2021-04-29

## 2021-04-29 ENCOUNTER — TRANSCRIBE ORDERS (OUTPATIENT)
Dept: ADMINISTRATIVE | Facility: HOSPITAL | Age: 51
End: 2021-04-29

## 2021-04-29 DIAGNOSIS — Z00.8 HEALTH EXAMINATION IN POPULATION SURVEY: Primary | ICD-10-CM

## 2021-04-29 DIAGNOSIS — Z00.8 HEALTH EXAMINATION IN POPULATION SURVEY: ICD-10-CM

## 2021-04-29 LAB
CHOLEST SERPL-MCNC: 168 MG/DL (ref 50–200)
EST. AVERAGE GLUCOSE BLD GHB EST-MCNC: 94 MG/DL
HBA1C MFR BLD: 4.9 %
HDLC SERPL-MCNC: 53 MG/DL
LDLC SERPL CALC-MCNC: 93 MG/DL (ref 0–100)
NONHDLC SERPL-MCNC: 115 MG/DL
TRIGL SERPL-MCNC: 110 MG/DL

## 2021-04-29 PROCEDURE — 36415 COLL VENOUS BLD VENIPUNCTURE: CPT

## 2021-04-29 PROCEDURE — 80061 LIPID PANEL: CPT

## 2021-04-29 PROCEDURE — 83036 HEMOGLOBIN GLYCOSYLATED A1C: CPT

## 2021-05-11 ENCOUNTER — TELEPHONE (OUTPATIENT)
Dept: OBGYN CLINIC | Facility: CLINIC | Age: 51
End: 2021-05-11

## 2021-05-11 DIAGNOSIS — N89.8 VAGINAL IRRITATION: Primary | ICD-10-CM

## 2021-05-11 RX ORDER — FLUCONAZOLE 150 MG/1
TABLET ORAL
Qty: 2 TABLET | Refills: 0 | Status: SHIPPED | OUTPATIENT
Start: 2021-05-11 | End: 2021-05-14

## 2021-05-11 NOTE — TELEPHONE ENCOUNTER
The patient called in stating that she is having an issue with a yeast infection that started yesterday and started getting worse this morning she is currently at work and is not able to get in for an appointment and she is then also going on vacation as of tomorrow  She was hoping that you would be able to send over a prescription for Diflucan for her

## 2021-08-02 ENCOUNTER — TELEPHONE (OUTPATIENT)
Dept: OBGYN CLINIC | Facility: MEDICAL CENTER | Age: 51
End: 2021-08-02

## 2021-08-02 NOTE — TELEPHONE ENCOUNTER
I called and left a voicemail returning the patient's earlier phone call from this afternoon concerning her appointment that needs to be rescheduled from 9/21 with Dr Candida Leon

## 2021-08-26 ENCOUNTER — TELEPHONE (OUTPATIENT)
Dept: VASCULAR SURGERY | Facility: CLINIC | Age: 51
End: 2021-08-26

## 2021-08-26 NOTE — TELEPHONE ENCOUNTER
Called patient to schedule - left message         WARNING! Based upon the critical issue with ransomware targeting Healthcare systems ALL attachments and links from this email should be heavily scrutinized  First Name: Mack Davis Name: Skylar Vasquez  YOB: 1970  Email: [email or web address]  Phone: [phone]   Address: [address] [place] [address] Drive: [place]: [place]  Zip: [address]: Vascular  Comments:

## 2021-09-16 PROBLEM — I83.893 SYMPTOMATIC VARICOSE VEINS OF BOTH LOWER EXTREMITIES: Status: ACTIVE | Noted: 2017-07-28

## 2021-09-17 ENCOUNTER — OFFICE VISIT (OUTPATIENT)
Dept: VASCULAR SURGERY | Facility: CLINIC | Age: 51
End: 2021-09-17
Payer: COMMERCIAL

## 2021-09-17 VITALS
BODY MASS INDEX: 38.09 KG/M2 | WEIGHT: 207 LBS | SYSTOLIC BLOOD PRESSURE: 118 MMHG | HEIGHT: 62 IN | HEART RATE: 86 BPM | DIASTOLIC BLOOD PRESSURE: 76 MMHG | TEMPERATURE: 98.9 F

## 2021-09-17 DIAGNOSIS — E66.9 OBESITY WITH BODY MASS INDEX 30 OR GREATER: ICD-10-CM

## 2021-09-17 DIAGNOSIS — I83.893 SYMPTOMATIC VARICOSE VEINS OF BOTH LOWER EXTREMITIES: Primary | ICD-10-CM

## 2021-09-17 PROCEDURE — 99204 OFFICE O/P NEW MOD 45 MIN: CPT | Performed by: PHYSICIAN ASSISTANT

## 2021-09-17 NOTE — PROGRESS NOTES
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, nonsmoker with obesity, JIGAR and symptomatic BLE superficial and reticular varicosities, L>R  Patient has worn compression for several years on a regular basis with mild symptomatic relief     -recommend 3 month trial of conservative measures to include daily use of compression stockings, lower extremity elevation, low-sodium diet, aerobic activity, weight management and skin moisturization  -LEVDR in 3 months  -return to office with surgeon in 3 months with LEVDR for re-evaluation and discussion of surgical options  -instructed to contact the office in the interim with any questions, concerns or new symptoms    Obesity with body mass index 30 or greater  -continue to encourage weight management, lifestyle modifications and diet modifications for prevention of vascular disease and adjuvant therapy of symptomatic varicose veins  -continue follow-up and management with PCP       Diagnoses and all orders for this visit:    Symptomatic varicose veins of both lower extremities  -     VAS Lower extremity venous insufficiency duplex, bilateral w/ measurements; Future    Obesity with body mass index 30 or greater    Other orders  -     Cancel: Compression Stocking          Subjective:      Patient ID: Cb Lovell is a 46 y o  female  Patient is new and is a self referral  Patient presents Varicose Veins that cause pain, itching, and achiness occasionally  Patient does wear OTC compression  Patient denies being previously treated  70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, nonsmoker with obesity, JIGAR and symptomatic BLE superficial and reticular varicosities, L>R, presents on self-referral for evaluation varicose veins  Patient reports a longstanding history bilateral lower extremity superficial varicosities  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    Patient has worn compression for several years on a regular basis with mild symptomatic relief  She complains of bilateral lower extremity heaviness, pruritus, burning which is exacerbated by standing for prolonged periods of time  She complains of increased symptoms and foot edema if she does not wear compression stockings  She denies history DVT, PE, hypercoagulable disorder, superficial thrombophlebitis, recurrent lower extremity cellulitis, stasis dermatitis, venous ulcerations or bleeding varicosities  She reports a significant family history of varicose veins in her mother  The following portions of the patient's history were reviewed and updated as appropriate: allergies, current medications, past family history, past medical history, past social history, past surgical history and problem list     Review of Systems   Constitutional: Negative  HENT: Negative  Eyes: Negative  Respiratory: Negative  Cardiovascular: Negative  Gastrointestinal: Negative  Endocrine: Negative  Genitourinary: Negative  Musculoskeletal: Negative  Skin: Negative  Allergic/Immunologic: Negative  Neurological: Negative  Hematological: Negative  Psychiatric/Behavioral: Negative  I have reviewed and made appropriate changes to the review of systems input by the medical assistant      Vitals:    09/17/21 1408   BP: 118/76   BP Location: Right arm   Patient Position: Sitting   Cuff Size: Standard   Pulse: 86   Temp: 98 9 °F (37 2 °C)   TempSrc: Tympanic   Weight: 93 9 kg (207 lb)   Height: 5' 2" (1 575 m)       Patient Active Problem List   Diagnosis    Depression, major, recurrent (HCC)    Eczema    Enlarged uterus    Fatigue    Hand dermatitis    Irregular menses    Obesity with body mass index 30 or greater    Spider vein, symptomatic    Symptomatic varicose veins of both lower extremities    Vertigo    Weight gain    Arthralgia    Obstructive sleep apnea treated with continuous positive airway pressure (CPAP)    Vitamin D deficiency    Candidal intertrigo       Past Surgical History:   Procedure Laterality Date    MOUTH SURGERY      ROOT CANAL      Apicoectomy; resolved; 07/26/17    WISDOM TOOTH EXTRACTION      18's       Family History   Problem Relation Age of Onset    Hypertension Mother    [de-identified] Rheum arthritis Mother     Diverticulosis Mother         plus diverticulitis    Cirrhosis Father     Leukemia Father 61        ALL    Diabetes Maternal Grandmother         mellitus    Hypertension Maternal Grandmother         pulmonary    Cancer Maternal Grandfather 48        face/throat    Anxiety disorder Paternal Grandmother     Depression Paternal Grandmother     Dementia Paternal Grandmother     Glaucoma Paternal Grandmother     Hypertension Paternal Grandmother     Lung cancer Paternal Grandfather 80    Heart attack Paternal Grandfather     No Known Problems Sister     No Known Problems Daughter        Social History     Socioeconomic History    Marital status: /Civil Union     Spouse name: Not on file    Number of children: Not on file    Years of education: college student    Highest education level: Not on file   Occupational History    Occupation: employed   Tobacco Use    Smoking status: Never Smoker    Smokeless tobacco: Never Used   Vaping Use    Vaping Use: Never used   Substance and Sexual Activity    Alcohol use: Yes     Comment: occassional    Drug use: Never    Sexual activity: Yes     Partners: Male     Birth control/protection: Male Sterilization, None   Other Topics Concern    Not on file   Social History Narrative    Daily coffee consumption 2 cups a day    Household: Mother     Social Determinants of Health     Financial Resource Strain:     Difficulty of Paying Living Expenses:    Food Insecurity:     Worried About Running Out of Food in the Last Year:     [address] in the Last Year:    Transportation Needs:     Lack of Transportation (Medical):      Lack of Transportation (Non-Medical):    Physical Activity:     Days of Exercise per Week:     Minutes of Exercise per Session:    Stress:     Feeling of Stress :    Social Connections:     Frequency of Communication with Friends and Family:     Frequency of Social Gatherings with Friends and Family:     Attends Baptism Services:     Active Member of Clubs or Organizations:     Attends Club or Organization Meetings:     Marital Status:    Intimate Partner Violence:     Fear of Current or Ex-Partner:     Emotionally Abused:     Physically Abused:     Sexually Abused:        No Known Allergies      Current Outpatient Medications:     clobetasol (TEMOVATE) 0 05 % cream, Apply topically 2 (two) times a day, Disp: 30 g, Rfl: 0    Ibuprofen (ADVIL PO), Take by mouth as needed, Disp: , Rfl:     magnesium 30 MG tablet, Take 30 mg by mouth 2 (two) times a day, Disp: , Rfl:     Multiple Vitamin (MULTIVITAMIN) tablet, Take 1 tablet by mouth daily, Disp: , Rfl:     VITAMIN D PO, Take 5,000 Units by mouth daily, Disp: , Rfl:     Objective:  Imaging study:  No vascular imaging studies for review    /76 (BP Location: Right arm, Patient Position: Sitting, Cuff Size: Standard)   Pulse 86   Temp 98 9 °F (37 2 °C) (Tympanic)   Ht 5' 2" (1 575 m)   Wt 93 9 kg (207 lb)   BMI 37 86 kg/m²          Physical Exam  Vitals and nursing note reviewed  Constitutional:       General: She is not in acute distress  Appearance: She is well-developed  HENT:      Head: Normocephalic and atraumatic  Eyes:      General: No scleral icterus  Conjunctiva/sclera: Conjunctivae normal       Pupils: Pupils are equal, round, and reactive to light  Neck:      Vascular: No JVD  Trachea: No tracheal deviation  Cardiovascular:      Rate and Rhythm: Normal rate and regular rhythm  Pulses:           Dorsalis pedis pulses are 2+ on the right side and 2+ on the left side        Heart sounds: S1 normal and S2 normal       Comments: Bilateral lower extremities warm, pink, motor and sensory intact and well perfused without cyanosis, pallor, rubor, lipodermatosclerosis, hemosiderin staining or tissue loss  Scattered superficial varicosities bilateral lower legs and lateral thighs  Reticular varicosity right anterior shin and left lateral thigh  Pulmonary:      Effort: Pulmonary effort is normal  No respiratory distress  Abdominal:      General: Bowel sounds are normal  There is no abdominal bruit  Palpations: Abdomen is soft  There is no pulsatile mass  Musculoskeletal:         General: Normal range of motion  Cervical back: Normal range of motion and neck supple  Skin:     General: Skin is warm and dry  Coloration: Skin is not pale  Findings: No erythema or lesion  Neurological:      Mental Status: She is alert and oriented to person, place, and time  Psychiatric:         Mood and Affect: Mood normal          Thought Content:  Thought content normal

## 2021-09-17 NOTE — ASSESSMENT & PLAN NOTE
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, nonsmoker with obesity, JIGAR and symptomatic BLE superficial and reticular varicosities, L>R    Patient has worn compression for several years on a regular basis with mild symptomatic relief     -recommend 3 month trial of conservative measures to include daily use of compression stockings, lower extremity elevation, low-sodium diet, aerobic activity, weight management and skin moisturization  -LEVDR in 3 months  -return to office with surgeon in 3 months with LEVDR for re-evaluation and discussion of surgical options  -instructed to contact the office in the interim with any questions, concerns or new symptoms

## 2021-09-21 DIAGNOSIS — Z20.822 CLOSE EXPOSURE TO COVID-19 VIRUS: Primary | ICD-10-CM

## 2021-09-21 PROCEDURE — U0003 INFECTIOUS AGENT DETECTION BY NUCLEIC ACID (DNA OR RNA); SEVERE ACUTE RESPIRATORY SYNDROME CORONAVIRUS 2 (SARS-COV-2) (CORONAVIRUS DISEASE [COVID-19]), AMPLIFIED PROBE TECHNIQUE, MAKING USE OF HIGH THROUGHPUT TECHNOLOGIES AS DESCRIBED BY CMS-2020-01-R: HCPCS | Performed by: PHYSICIAN ASSISTANT

## 2021-09-21 PROCEDURE — U0005 INFEC AGEN DETEC AMPLI PROBE: HCPCS | Performed by: PHYSICIAN ASSISTANT

## 2021-09-21 NOTE — PROGRESS NOTES
Pt needs to be tested due to covid exposure   [facility]7 [facility]

## 2021-09-27 DIAGNOSIS — Z20.822 CLOSE EXPOSURE TO COVID-19 VIRUS: Primary | ICD-10-CM

## 2021-09-27 NOTE — PROGRESS NOTES
Per [facility] [employer] employee must test 2 days after exposure  If it is negative pt needs to test again in 5-7 days    Order is in chart

## 2021-09-28 PROCEDURE — U0005 INFEC AGEN DETEC AMPLI PROBE: HCPCS | Performed by: INTERNAL MEDICINE

## 2021-09-28 PROCEDURE — U0003 INFECTIOUS AGENT DETECTION BY NUCLEIC ACID (DNA OR RNA); SEVERE ACUTE RESPIRATORY SYNDROME CORONAVIRUS 2 (SARS-COV-2) (CORONAVIRUS DISEASE [COVID-19]), AMPLIFIED PROBE TECHNIQUE, MAKING USE OF HIGH THROUGHPUT TECHNOLOGIES AS DESCRIBED BY CMS-2020-01-R: HCPCS | Performed by: INTERNAL MEDICINE

## 2021-10-06 ENCOUNTER — IMMUNIZATIONS (OUTPATIENT)
Dept: FAMILY MEDICINE CLINIC | Facility: MEDICAL CENTER | Age: 51
End: 2021-10-06

## 2021-10-06 DIAGNOSIS — Z23 ENCOUNTER FOR IMMUNIZATION: Primary | ICD-10-CM

## 2021-10-06 PROCEDURE — 91300 SARSCOV2 VAC 30MCG/0.3ML IM: CPT

## 2021-10-18 ENCOUNTER — HOSPITAL ENCOUNTER (OUTPATIENT)
Dept: NON INVASIVE DIAGNOSTICS | Facility: CLINIC | Age: 51
Discharge: HOME | End: 2021-10-18
Payer: COMMERCIAL

## 2021-10-18 DIAGNOSIS — I83.893 SYMPTOMATIC VARICOSE VEINS OF BOTH LOWER EXTREMITIES: ICD-10-CM

## 2021-10-18 PROCEDURE — 93970 EXTREMITY STUDY: CPT | Performed by: SURGERY

## 2021-10-18 PROCEDURE — 93970 EXTREMITY STUDY: CPT

## 2021-10-28 ENCOUNTER — OFFICE VISIT (OUTPATIENT)
Dept: VASCULAR SURGERY | Facility: CLINIC | Age: 51
End: 2021-10-28
Payer: COMMERCIAL

## 2021-10-28 VITALS
DIASTOLIC BLOOD PRESSURE: 82 MMHG | WEIGHT: 212 LBS | HEIGHT: 62 IN | BODY MASS INDEX: 39.01 KG/M2 | HEART RATE: 89 BPM | SYSTOLIC BLOOD PRESSURE: 118 MMHG

## 2021-10-28 DIAGNOSIS — I87.2 VENOUS INSUFFICIENCY OF BOTH LOWER EXTREMITIES: ICD-10-CM

## 2021-10-28 DIAGNOSIS — I83.93 SPIDER VEINS OF BOTH LOWER EXTREMITIES: Primary | Chronic | ICD-10-CM

## 2021-10-28 PROBLEM — I83.893 SYMPTOMATIC VARICOSE VEINS OF BOTH LOWER EXTREMITIES: Status: RESOLVED | Noted: 2017-07-28 | Resolved: 2021-10-28

## 2021-10-28 PROCEDURE — 99213 OFFICE O/P EST LOW 20 MIN: CPT | Performed by: SURGERY

## 2021-11-10 ENCOUNTER — ANNUAL EXAM (OUTPATIENT)
Dept: OBGYN CLINIC | Facility: MEDICAL CENTER | Age: 51
End: 2021-11-10
Payer: COMMERCIAL

## 2021-11-10 VITALS — WEIGHT: 212 LBS | BODY MASS INDEX: 38.78 KG/M2 | DIASTOLIC BLOOD PRESSURE: 75 MMHG | SYSTOLIC BLOOD PRESSURE: 125 MMHG

## 2021-11-10 DIAGNOSIS — Z12.31 ENCOUNTER FOR SCREENING MAMMOGRAM FOR MALIGNANT NEOPLASM OF BREAST: ICD-10-CM

## 2021-11-10 DIAGNOSIS — Z01.419 ENCOUNTER FOR GYNECOLOGICAL EXAMINATION WITH PAPANICOLAOU SMEAR OF CERVIX: Primary | ICD-10-CM

## 2021-11-10 PROCEDURE — 99396 PREV VISIT EST AGE 40-64: CPT | Performed by: OBSTETRICS & GYNECOLOGY

## 2021-11-10 PROCEDURE — G0124 SCREEN C/V THIN LAYER BY MD: HCPCS | Performed by: PATHOLOGY

## 2021-11-10 PROCEDURE — G0145 SCR C/V CYTO,THINLAYER,RESCR: HCPCS | Performed by: PATHOLOGY

## 2021-11-10 PROCEDURE — G0476 HPV COMBO ASSAY CA SCREEN: HCPCS | Performed by: OBSTETRICS & GYNECOLOGY

## 2021-11-12 LAB
HPV HR 12 DNA CVX QL NAA+PROBE: NEGATIVE
HPV16 DNA CVX QL NAA+PROBE: NEGATIVE
HPV18 DNA CVX QL NAA+PROBE: NEGATIVE

## 2021-11-16 LAB
LAB AP GYN PRIMARY INTERPRETATION: NORMAL
Lab: NORMAL
PATH INTERP SPEC-IMP: NORMAL

## 2021-11-23 ENCOUNTER — CONSULT (OUTPATIENT)
Dept: VASCULAR SURGERY | Facility: CLINIC | Age: 51
End: 2021-11-23
Payer: COMMERCIAL

## 2021-11-23 VITALS
HEART RATE: 77 BPM | BODY MASS INDEX: 39.93 KG/M2 | HEIGHT: 62 IN | WEIGHT: 217 LBS | DIASTOLIC BLOOD PRESSURE: 80 MMHG | SYSTOLIC BLOOD PRESSURE: 124 MMHG

## 2021-11-23 DIAGNOSIS — I83.893 SYMPTOMATIC VARICOSE VEINS OF BOTH LOWER EXTREMITIES: ICD-10-CM

## 2021-11-23 DIAGNOSIS — I83.93 SPIDER VEINS OF BOTH LOWER EXTREMITIES: Primary | Chronic | ICD-10-CM

## 2021-11-23 PROCEDURE — 99213 OFFICE O/P EST LOW 20 MIN: CPT | Performed by: NURSE PRACTITIONER

## 2021-12-06 ENCOUNTER — OFFICE VISIT (OUTPATIENT)
Dept: FAMILY MEDICINE CLINIC | Facility: CLINIC | Age: 51
End: 2021-12-06
Payer: COMMERCIAL

## 2021-12-06 VITALS
BODY MASS INDEX: 39.56 KG/M2 | HEART RATE: 88 BPM | HEIGHT: 62 IN | SYSTOLIC BLOOD PRESSURE: 110 MMHG | DIASTOLIC BLOOD PRESSURE: 62 MMHG | OXYGEN SATURATION: 98 % | WEIGHT: 215 LBS

## 2021-12-06 DIAGNOSIS — E66.9 OBESITY WITH BODY MASS INDEX 30 OR GREATER: ICD-10-CM

## 2021-12-06 DIAGNOSIS — I78.1 SPIDER VEIN, SYMPTOMATIC: ICD-10-CM

## 2021-12-06 DIAGNOSIS — I87.2 VENOUS INSUFFICIENCY OF BOTH LOWER EXTREMITIES: ICD-10-CM

## 2021-12-06 DIAGNOSIS — G47.33 OBSTRUCTIVE SLEEP APNEA TREATED WITH CONTINUOUS POSITIVE AIRWAY PRESSURE (CPAP): ICD-10-CM

## 2021-12-06 DIAGNOSIS — Z99.89 OBSTRUCTIVE SLEEP APNEA TREATED WITH CONTINUOUS POSITIVE AIRWAY PRESSURE (CPAP): ICD-10-CM

## 2021-12-06 DIAGNOSIS — Z00.00 ANNUAL PHYSICAL EXAM: Primary | ICD-10-CM

## 2021-12-06 DIAGNOSIS — E55.9 VITAMIN D DEFICIENCY: ICD-10-CM

## 2021-12-06 DIAGNOSIS — R22.1 NECK SWELLING: ICD-10-CM

## 2021-12-06 PROCEDURE — 99396 PREV VISIT EST AGE 40-64: CPT | Performed by: PHYSICIAN ASSISTANT

## 2021-12-13 ENCOUNTER — HOSPITAL ENCOUNTER (OUTPATIENT)
Dept: MAMMOGRAPHY | Facility: MEDICAL CENTER | Age: 51
Discharge: HOME | End: 2021-12-13
Payer: COMMERCIAL

## 2021-12-13 VITALS — HEIGHT: 62 IN | BODY MASS INDEX: 39.56 KG/M2 | WEIGHT: 214.95 LBS

## 2021-12-13 DIAGNOSIS — Z12.31 ENCOUNTER FOR SCREENING MAMMOGRAM FOR MALIGNANT NEOPLASM OF BREAST: ICD-10-CM

## 2021-12-13 PROCEDURE — 77063 BREAST TOMOSYNTHESIS BI: CPT

## 2021-12-13 PROCEDURE — 77067 SCR MAMMO BI INCL CAD: CPT

## 2021-12-23 ENCOUNTER — HOSPITAL ENCOUNTER (OUTPATIENT)
Dept: ULTRASOUND IMAGING | Facility: MEDICAL CENTER | Age: 51
Discharge: HOME | End: 2021-12-23
Payer: COMMERCIAL

## 2021-12-23 ENCOUNTER — APPOINTMENT (OUTPATIENT)
Dept: LAB | Facility: MEDICAL CENTER | Age: 51
End: 2021-12-23
Payer: COMMERCIAL

## 2021-12-23 DIAGNOSIS — E03.9 HYPOTHYROIDISM, UNSPECIFIED TYPE: ICD-10-CM

## 2021-12-23 DIAGNOSIS — E55.9 VITAMIN D DEFICIENCY: ICD-10-CM

## 2021-12-23 DIAGNOSIS — R22.1 NECK SWELLING: ICD-10-CM

## 2021-12-23 DIAGNOSIS — E66.9 OBESITY WITH BODY MASS INDEX 30 OR GREATER: ICD-10-CM

## 2021-12-23 DIAGNOSIS — Z00.00 ANNUAL PHYSICAL EXAM: ICD-10-CM

## 2021-12-23 LAB
25(OH)D3 SERPL-MCNC: 30 NG/ML (ref 30–100)
ALBUMIN SERPL BCP-MCNC: 4 G/DL (ref 3.5–5)
ALP SERPL-CCNC: 78 U/L (ref 46–116)
ALT SERPL W P-5'-P-CCNC: 18 U/L (ref 12–78)
ANION GAP SERPL CALCULATED.3IONS-SCNC: 3 MMOL/L (ref 4–13)
AST SERPL W P-5'-P-CCNC: 11 U/L (ref 5–45)
BASOPHILS # BLD AUTO: 0.03 THOUSANDS/ΜL (ref 0–0.1)
BASOPHILS NFR BLD AUTO: 1 % (ref 0–1)
BILIRUB SERPL-MCNC: 0.38 MG/DL (ref 0.2–1)
BUN SERPL-MCNC: 14 MG/DL (ref 5–25)
CALCIUM SERPL-MCNC: 9.2 MG/DL (ref 8.3–10.1)
CHLORIDE SERPL-SCNC: 108 MMOL/L (ref 100–108)
CO2 SERPL-SCNC: 29 MMOL/L (ref 21–32)
CREAT SERPL-MCNC: 0.85 MG/DL (ref 0.6–1.3)
EOSINOPHIL # BLD AUTO: 0.06 THOUSAND/ΜL (ref 0–0.61)
EOSINOPHIL NFR BLD AUTO: 1 % (ref 0–6)
ERYTHROCYTE [DISTWIDTH] IN BLOOD BY AUTOMATED COUNT: 12.4 % (ref 11.6–15.1)
GFR SERPL CREATININE-BSD FRML MDRD: 79 ML/MIN/1.73SQ M
GLUCOSE P FAST SERPL-MCNC: 99 MG/DL (ref 65–99)
HCT VFR BLD AUTO: 40.9 % (ref 34.8–46.1)
HGB BLD-MCNC: 13.6 G/DL (ref 11.5–15.4)
IMM GRANULOCYTES # BLD AUTO: 0.01 THOUSAND/UL (ref 0–0.2)
IMM GRANULOCYTES NFR BLD AUTO: 0 % (ref 0–2)
LYMPHOCYTES # BLD AUTO: 1.52 THOUSANDS/ΜL (ref 0.6–4.47)
LYMPHOCYTES NFR BLD AUTO: 33 % (ref 14–44)
MCH RBC QN AUTO: 30.2 PG (ref 26.8–34.3)
MCHC RBC AUTO-ENTMCNC: 33.3 G/DL (ref 31.4–37.4)
MCV RBC AUTO: 91 FL (ref 82–98)
MONOCYTES # BLD AUTO: 0.26 THOUSAND/ΜL (ref 0.17–1.22)
MONOCYTES NFR BLD AUTO: 6 % (ref 4–12)
NEUTROPHILS # BLD AUTO: 2.67 THOUSANDS/ΜL (ref 1.85–7.62)
NEUTS SEG NFR BLD AUTO: 59 % (ref 43–75)
NRBC BLD AUTO-RTO: 0 /100 WBCS
PLATELET # BLD AUTO: 199 THOUSANDS/UL (ref 149–390)
PMV BLD AUTO: 10.1 FL (ref 8.9–12.7)
POTASSIUM SERPL-SCNC: 4 MMOL/L (ref 3.5–5.3)
PROT SERPL-MCNC: 7.5 G/DL (ref 6.4–8.2)
RBC # BLD AUTO: 4.51 MILLION/UL (ref 3.81–5.12)
SODIUM SERPL-SCNC: 140 MMOL/L (ref 136–145)
T3FREE SERPL-MCNC: 3.03 PG/ML (ref 2.3–4.2)
T4 FREE SERPL-MCNC: 0.94 NG/DL (ref 0.76–1.46)
TSH SERPL DL<=0.05 MIU/L-ACNC: 3.81 UIU/ML (ref 0.36–3.74)
WBC # BLD AUTO: 4.55 THOUSAND/UL (ref 4.31–10.16)

## 2021-12-23 PROCEDURE — 84443 ASSAY THYROID STIM HORMONE: CPT

## 2021-12-23 PROCEDURE — 84439 ASSAY OF FREE THYROXINE: CPT

## 2021-12-23 PROCEDURE — 84481 FREE ASSAY (FT-3): CPT

## 2021-12-23 PROCEDURE — 80053 COMPREHEN METABOLIC PANEL: CPT

## 2021-12-23 PROCEDURE — 86376 MICROSOMAL ANTIBODY EACH: CPT

## 2021-12-23 PROCEDURE — 85025 COMPLETE CBC W/AUTO DIFF WBC: CPT

## 2021-12-23 PROCEDURE — 82306 VITAMIN D 25 HYDROXY: CPT

## 2021-12-23 PROCEDURE — 76536 US EXAM OF HEAD AND NECK: CPT

## 2021-12-23 PROCEDURE — 36415 COLL VENOUS BLD VENIPUNCTURE: CPT

## 2021-12-23 PROCEDURE — 86800 THYROGLOBULIN ANTIBODY: CPT

## 2021-12-23 PROCEDURE — 84432 ASSAY OF THYROGLOBULIN: CPT

## 2021-12-23 PROCEDURE — 84482 T3 REVERSE: CPT

## 2021-12-24 LAB
THYROGLOB AB SERPL-ACNC: <1 IU/ML (ref 0–0.9)
THYROGLOB SERPL-MCNC: 44.4 NG/ML (ref 1.5–38.5)
THYROPEROXIDASE AB SERPL-ACNC: 109 IU/ML (ref 0–34)

## 2022-01-02 ENCOUNTER — AMB VIDEO VISIT (OUTPATIENT)
Dept: OTHER | Facility: HOSPITAL | Age: 52
End: 2022-01-02

## 2022-01-02 LAB — T3REVERSE SERPL-MCNC: 17.2 NG/DL (ref 9.2–24.1)

## 2022-01-03 ENCOUNTER — TELEPHONE (OUTPATIENT)
Dept: FAMILY MEDICINE CLINIC | Facility: CLINIC | Age: 52
End: 2022-01-03

## 2022-01-03 DIAGNOSIS — Z03.818 ENCOUNTER FOR OBSERVATION FOR SUSPECTED EXPOSURE TO OTHER BIOLOGICAL AGENTS RULED OUT: Primary | ICD-10-CM

## 2022-01-03 PROCEDURE — U0003 INFECTIOUS AGENT DETECTION BY NUCLEIC ACID (DNA OR RNA); SEVERE ACUTE RESPIRATORY SYNDROME CORONAVIRUS 2 (SARS-COV-2) (CORONAVIRUS DISEASE [COVID-19]), AMPLIFIED PROBE TECHNIQUE, MAKING USE OF HIGH THROUGHPUT TECHNOLOGIES AS DESCRIBED BY CMS-2020-01-R: HCPCS | Performed by: PHYSICIAN ASSISTANT

## 2022-01-03 PROCEDURE — U0005 INFEC AGEN DETEC AMPLI PROBE: HCPCS | Performed by: PHYSICIAN ASSISTANT

## 2022-01-03 NOTE — TELEPHONE ENCOUNTER
Pt called requesting a Covid test   Her symptoms started 12/29-nasal drainage, headache, ears hurt, cough  To her knowledge she has not been exposed

## 2022-01-06 PROBLEM — R76.8 THYROID ANTIBODY POSITIVE: Status: ACTIVE | Noted: 2022-01-06

## 2022-01-06 PROBLEM — R79.89 ABNORMAL TSH: Status: ACTIVE | Noted: 2022-01-06

## 2022-01-17 ENCOUNTER — TELEPHONE (OUTPATIENT)
Dept: VASCULAR SURGERY | Facility: CLINIC | Age: 52
End: 2022-01-17

## 2022-01-17 NOTE — TELEPHONE ENCOUNTER
Pt notified and verbalized understanding  Rx for compression stockings faxed to [facility] per pt request  Also mailed copies to the pt

## 2022-01-17 NOTE — TELEPHONE ENCOUNTER
Please see below pt advice request  I did send her a message back letting her know that she needs the compression stockings for immediately after the injections so she will need to reschedule  Requested she let us know which DME provider she wants us to send the rx to  Please advise about her concern w/ COVID

## 2022-01-17 NOTE — TELEPHONE ENCOUNTER
[place]  to [facility], [address]        1/17/22 1:10 PM  Hello  If I remember correctly I was to get compression stocking before procedure but I never received script to get them      I also had covid and I was wondering with risk of blood clots from having covid if getting the procedure done is a good idea ?

## 2022-01-31 NOTE — CARE ANYWHERE EVISITS
Visit Summary for King Carias - Gender: Female - Date of Birth: [id number]  Date: [id number] - Duration: 14 minutes  Patient: King Carias  Provider: Kellee Mae    Patient Contact Information  Address  [address] [place] [address]; [address] [facility]  [phone]    Visit Topics  Allergic reaction s/s Runny nose cough congestion headache eqr ache throat tickle i think is turning into a sinus infection  [Added By: Self - 2022-01-02]    Conversation Transcripts  [0A][0A] [Notification] You are connected with Md Anna Jamil, Adult Medicine [0A][Notification] King Carias is located in [place]  [0A][Notification] King Carias has shared health history  Fabrizio Brasher  [0A]    Diagnosis  Allergic rhinitis, unspecified    Procedures  Value: [id number] Code: CPT-4 UNLISTED E&M SERVICE    Medications Prescribed    azithromycin  Dose : 1 tablet  Route : oral  Frequency : every day  Refills : 0  Instructions to the Pharmacist : Substitutions allowed    Zyrtec-D  Dose : 1 tablet  Route : oral  Frequency : 2 times a day  Until directed to stop  Refills : 0  Instructions to the Pharmacist : Substitutions allowed    Medrol Acie Second)      Frequency :   Patient Instructions : 6tab, 5 tab, 4tab, 3tab, 2 tab, 1tab on day 6  Refills : 0  Instructions to the Pharmacist : Substitutions allowed    vit D3-folic [id number]      Frequency :             Provider Notes  [0A][0A] Pt is 46 Year/F who after having skin testing from the Allergy immunology last week Wednesday has been experiencing worseing congestion, rhinorrhea, feeling of fullness  Pt apparently has allergic reaction in the MD office where she was given   Benadryl  Her symptoms has worsened since then, although she denies any fever, chills, malaise and oral intake has been well  [0A]PMH and PSH reviewed with the patient [0A]Pt self reported Temp was afebrile[0A]O/E:[0A]Pt has flushed facies, sounded   congested[0A]Physical exam was limited due to the nature of the visit[0A]No sick contacts[0A]Advice:[0A]-Medrol dose pack[0A]-Zyrtec D[0A]-Azithromycin 250mg x 3 days[0A]- Benadryl Prn only[0A]-identify the allergen and pls f/u with the Allergy   MD[0A][0A]    Electronically signed by: Jillian Canela Md(NPI [id number])

## 2022-02-16 ENCOUNTER — OFFICE VISIT (OUTPATIENT)
Dept: OBGYN CLINIC | Facility: MEDICAL CENTER | Age: 52
End: 2022-02-16
Payer: COMMERCIAL

## 2022-02-16 ENCOUNTER — APPOINTMENT (OUTPATIENT)
Dept: RADIOLOGY | Facility: MEDICAL CENTER | Age: 52
End: 2022-02-16
Payer: COMMERCIAL

## 2022-02-16 VITALS
SYSTOLIC BLOOD PRESSURE: 108 MMHG | BODY MASS INDEX: 38.64 KG/M2 | HEART RATE: 82 BPM | DIASTOLIC BLOOD PRESSURE: 74 MMHG | HEIGHT: 62 IN | WEIGHT: 210 LBS

## 2022-02-16 DIAGNOSIS — M25.512 LEFT SHOULDER PAIN, UNSPECIFIED CHRONICITY: ICD-10-CM

## 2022-02-16 DIAGNOSIS — M25.512 CHRONIC LEFT SHOULDER PAIN: Primary | ICD-10-CM

## 2022-02-16 DIAGNOSIS — M19.012 OSTEOARTHRITIS OF LEFT AC (ACROMIOCLAVICULAR) JOINT: ICD-10-CM

## 2022-02-16 DIAGNOSIS — M75.32 CALCIFIC TENDONITIS OF LEFT SHOULDER: ICD-10-CM

## 2022-02-16 DIAGNOSIS — G89.29 CHRONIC LEFT SHOULDER PAIN: Primary | ICD-10-CM

## 2022-02-16 PROCEDURE — 73030 X-RAY EXAM OF SHOULDER: CPT

## 2022-02-16 PROCEDURE — 99203 OFFICE O/P NEW LOW 30 MIN: CPT | Performed by: EMERGENCY MEDICINE

## 2022-02-16 NOTE — PROGRESS NOTES
Assessment/Plan:    Diagnoses and all orders for this visit:    Chronic left shoulder pain  -     XR shoulder 2+ vw left; Future  -     Ambulatory Referral to Physical Therapy; Future    Calcific tendonitis of left shoulder  -     Ambulatory Referral to Physical Therapy; Future    Osteoarthritis of left AC (acromioclavicular) joint    Discussed treatment options   Patient will proceed with more consistent use of NSAIDs, will start PT, declines steroid injection at this time  Return in about 4 weeks (around 3/16/2022)  Chief Complaint:     Chief Complaint   Patient presents with    Left Shoulder - Pain       Subjective:   Patient ID: Leydi Arellano is a 46 y o  female  NP presents for Left shoulder pain since November along with decreased ROM particularly internal rotation and across body  Denies injury  Taking Tylenol and Ibuprofen only PRN  She did take Medrol dose pack previously for COVID in January but did not notice any benefit from this  Review of Systems    The following portions of the patient's chart were reviewed and updated as appropriate:    Allergy:  No Known Allergies      Past Medical History:   Diagnosis Date    Depression     Sleep apnea        Past Surgical History:   Procedure Laterality Date    MOUTH SURGERY      ROOT CANAL      Apicoectomy; resolved; 07/26/17    WISDOM TOOTH EXTRACTION      1980's       Social History     Socioeconomic History    Marital status: /Civil Union     Spouse name: Not on file    Number of children: Not on file    Years of education: college student    Highest education level: Not on file   Occupational History    Occupation: employed   Tobacco Use    Smoking status: Never Smoker    Smokeless tobacco: Never Used   Vaping Use    Vaping Use: Never used   Substance and Sexual Activity    Alcohol use: Yes     Comment: occassional    Drug use: Never    Sexual activity: Yes     Partners: Male     Birth control/protection: Male Sterilization, None   Other Topics Concern    Not on file   Social History Narrative    Daily coffee consumption 2 cups a day    Household:  Mother     Social Determinants of Health     Financial Resource Strain: Not on file   Food Insecurity: Not on file   Transportation Needs: Not on file   Physical Activity: Not on file   Stress: Not on file   Social Connections: Not on file   Intimate Partner Violence: Not on file   Housing Stability: Not on file       Family History   Problem Relation Age of Onset    Hypertension Mother    Jaden [place] Rheum arthritis Mother     Diverticulosis Mother         plus diverticulitis    Cirrhosis Father     Leukemia Father 61        ALL    Diabetes Maternal Grandmother         mellitus    Hypertension Maternal Grandmother         pulmonary    Cancer Maternal Grandfather 48        face/throat    Anxiety disorder Paternal Grandmother     Depression Paternal Grandmother     Dementia Paternal Grandmother     Glaucoma Paternal Grandmother     Hypertension Paternal Grandmother     Lung cancer Paternal Grandfather 80    Heart attack Paternal Grandfather     No Known Problems Sister     No Known Problems Daughter        Medications:    Current Outpatient Medications:     clobetasol (TEMOVATE) 0 05 % cream, Apply topically 2 (two) times a day (Patient taking differently: Apply topically as needed ), Disp: 30 g, Rfl: 0    Ibuprofen (ADVIL PO), Take by mouth as needed, Disp: , Rfl:     magnesium 30 MG tablet, Take 30 mg by mouth as needed , Disp: , Rfl:     Multiple Vitamin (MULTIVITAMIN) tablet, Take 1 tablet by mouth as needed , Disp: , Rfl:     VITAMIN D PO, Take 5,000 Units by mouth as needed , Disp: , Rfl:     Patient Active Problem List   Diagnosis    Depression, major, recurrent (HCC)    Eczema    Enlarged uterus    Fatigue    Hand dermatitis    Irregular menses    Obesity with body mass index 30 or greater    Spider vein, symptomatic    Spider veins of both lower extremities    Vertigo    Weight gain    Arthralgia    Obstructive sleep apnea treated with continuous positive airway pressure (CPAP)    Vitamin D deficiency    Candidal intertrigo    Venous insufficiency of both lower extremities    Abnormal TSH    Thyroid antibody positive       Objective:  /74   Pulse 82   Ht 5' 2" (1 575 m)   Wt 95 3 kg (210 lb)   BMI 38 41 kg/m²     Right Shoulder Exam     Range of Motion   The patient has normal right shoulder ROM  Left Shoulder Exam     Tenderness   The patient is experiencing no tenderness  Range of Motion   Active abduction: normal   External rotation: normal   Internal rotation 0 degrees: abnormal     Muscle Strength   External rotation: 5/5   Supraspinatus: 5/5     Tests   Cross arm: positive  Drop arm: negative            Physical Exam      Neurologic Exam    Procedures    I have personally reviewed pertinent films in PACS and my interpretation is Xrays left shoulder reveal degenerative changes AC joint, along with possible evidence of calcific tendonopathy    No acute findings

## 2022-02-16 NOTE — PATIENT INSTRUCTIONS
You may use Advil (ibuprofen) 600mg every 6 hours OR Aleve (naproxen) 250-500mg every 12 hours as needed for pain and inflammation  You may also take Tylenol 500mg every 4-6 hours as needed OR max 1,000mg per dose up to 3 times per day for a total of 3,000mg per day  Check with your primary care physician to see if these medications are safe to take and to make sure they do not interfere with your other medications and medical issues  Calcific Tendinitis   WHAT YOU NEED TO KNOW:   Calcific tendinitis is a condition that occurs when calcium collects in the tendons of the shoulder  Tendons are bands of tissue that connect muscle to bone  The calcium can make the tendons swell and cause severe pain  DISCHARGE INSTRUCTIONS:   Medicines: You may need any of the following:  · NSAIDs  may decrease swelling and pain  This medicine can be bought with or without a doctor's order  This medicine can cause stomach bleeding or kidney problems in certain people  If you take blood thinner medicine, always ask your healthcare provider if NSAIDs are safe for you  Always read the medicine label and follow the directions on it before using this medicine  · Steroids  help decrease inflammation  · Take your medicine as directed  Contact your healthcare provider if you think your medicine is not helping or if you have side effects  Tell him or her if you are allergic to any medicine  Keep a list of the medicines, vitamins, and herbs you take  Include the amounts, and when and why you take them  Bring the list or the pill bottles to follow-up visits  Carry your medicine list with you in case of an emergency  Go to physical therapy:  A physical therapist can teach you exercises to help improve movement and strength, and to decrease pain  Apply ice:  Apply ice on your shoulder for 15 to 20 minutes every hour or as directed  Use an ice pack, or put crushed ice in a plastic bag  Cover it with a towel   Ice helps prevent tissue damage and decreases swelling and pain  Apply heat:  Apply heat on your shoulder for 20 to 30 minutes every 2 hours for as many days as directed  Heat helps decrease pain and muscle spasms  Rest your arm:  Healthcare providers may have you place an item, such as a ball, between your side and elbow while you rest  This may help decrease stiffness and pain  Follow up with your healthcare provider as directed:  Bring a list of any questions you have so you remember to ask them during your visits  Contact your healthcare provider if:   · You have worse pain and stiffness in your shoulder  · You have new or more trouble moving your arm  · You have questions or concerns about your condition or care  Return to the emergency department if:   · You cannot move your arm  · You have severe pain in your arm or shoulder  © Copyright [employer] 2021 Information is for End User's use only and may not be sold, redistributed or otherwise used for commercial purposes  All illustrations and images included in CareNotes® are the copyrighted property of A D A M , Inc  or [facility] Carissa Henry   The above information is an  only  It is not intended as medical advice for individual conditions or treatments  Talk to your doctor, nurse or pharmacist before following any medical regimen to see if it is safe and effective for you

## 2022-02-16 NOTE — LETTER
February 21, 2022     Daria Collado PA-C  [address]  [address] [address]    Patient: Julisa Krishna   YOB: 1970   Date of Visit: 2/16/2022       Dear Dr Jose Martin Nicolas: Thank you for referring Candice Rees to me for evaluation  Below are my notes for this consultation  If you have questions, please do not hesitate to call me  I look forward to following your patient along with you  Sincerely,        Jacob Hawley MD        CC: No Recipients  Jacob Hawley MD  2/16/2022 11:52 AM  Signed      Assessment/Plan:    Diagnoses and all orders for this visit:    Chronic left shoulder pain  -     XR shoulder 2+ vw left; Future  -     Ambulatory Referral to Physical Therapy; Future    Calcific tendonitis of left shoulder  -     Ambulatory Referral to Physical Therapy; Future    Osteoarthritis of left AC (acromioclavicular) joint    Discussed treatment options   Patient will proceed with more consistent use of NSAIDs, will start PT, declines steroid injection at this time  Return in about 4 weeks (around 3/16/2022)  Chief Complaint:     Chief Complaint   Patient presents with    Left Shoulder - Pain       Subjective:   Patient ID: Julisa Krishna is a 46 y o  female  NP presents for Left shoulder pain since November along with decreased ROM particularly internal rotation and across body  Denies injury  Taking Tylenol and Ibuprofen only PRN  She did take Medrol dose pack previously for COVID in January but did not notice any benefit from this  Review of Systems    The following portions of the patient's chart were reviewed and updated as appropriate:    Allergy:  No Known Allergies      Past Medical History:   Diagnosis Date    Depression     Sleep apnea        Past Surgical History:   Procedure Laterality Date    MOUTH SURGERY      ROOT CANAL      Apicoectomy; resolved; 07/26/17    WISDOM TOOTH EXTRACTION      1980's       Social History     Socioeconomic History    Marital status: /Civil Union     Spouse name: Not on file    Number of children: Not on file    Years of education: college student    Highest education level: Not on file   Occupational History    Occupation: employed   Tobacco Use    Smoking status: Never Smoker    Smokeless tobacco: Never Used   Vaping Use    Vaping Use: Never used   Substance and Sexual Activity    Alcohol use: Yes     Comment: occassional    Drug use: Never    Sexual activity: Yes     Partners: Male     Birth control/protection: Male Sterilization, None   Other Topics Concern    Not on file   Social History Narrative    Daily coffee consumption 2 cups a day    Household:  Mother     Social Determinants of Health     Financial Resource Strain: Not on file   Food Insecurity: Not on file   Transportation Needs: Not on file   Physical Activity: Not on file   Stress: Not on file   Social Connections: Not on file   Intimate Partner Violence: Not on file   Housing Stability: Not on file       Family History   Problem Relation Age of Onset    Hypertension Mother    Rich [place] Rheum arthritis Mother     Diverticulosis Mother         plus diverticulitis    Cirrhosis Father     Leukemia Father 61        ALL    Diabetes Maternal Grandmother         mellitus    Hypertension Maternal Grandmother         pulmonary    Cancer Maternal Grandfather 48        face/throat    Anxiety disorder Paternal Grandmother     Depression Paternal Grandmother     Dementia Paternal Grandmother     Glaucoma Paternal Grandmother     Hypertension Paternal Grandmother     Lung cancer Paternal Grandfather 80    Heart attack Paternal Grandfather     No Known Problems Sister     No Known Problems Daughter        Medications:    Current Outpatient Medications:     clobetasol (TEMOVATE) 0 05 % cream, Apply topically 2 (two) times a day (Patient taking differently: Apply topically as needed ), Disp: 30 g, Rfl: 0    Ibuprofen (ADVIL PO), Take by mouth as needed, Disp: , Rfl:     magnesium 30 MG tablet, Take 30 mg by mouth as needed , Disp: , Rfl:     Multiple Vitamin (MULTIVITAMIN) tablet, Take 1 tablet by mouth as needed , Disp: , Rfl:     VITAMIN D PO, Take 5,000 Units by mouth as needed , Disp: , Rfl:     Patient Active Problem List   Diagnosis    Depression, major, recurrent (HCC)    Eczema    Enlarged uterus    Fatigue    Hand dermatitis    Irregular menses    Obesity with body mass index 30 or greater    Spider vein, symptomatic    Spider veins of both lower extremities    Vertigo    Weight gain    Arthralgia    Obstructive sleep apnea treated with continuous positive airway pressure (CPAP)    Vitamin D deficiency    Candidal intertrigo    Venous insufficiency of both lower extremities    Abnormal TSH    Thyroid antibody positive       Objective:  /74   Pulse 82   Ht 5' 2" (1 575 m)   Wt 95 3 kg (210 lb)   BMI 38 41 kg/m²     Right Shoulder Exam     Range of Motion   The patient has normal right shoulder ROM  Left Shoulder Exam     Tenderness   The patient is experiencing no tenderness  Range of Motion   Active abduction: normal   External rotation: normal   Internal rotation 0 degrees: abnormal     Muscle Strength   External rotation: 5/5   Supraspinatus: 5/5     Tests   Cross arm: positive  Drop arm: negative            Physical Exam      Neurologic Exam    Procedures    I have personally reviewed pertinent films in PACS and my interpretation is Xrays left shoulder reveal degenerative changes AC joint, along with possible evidence of calcific tendonopathy    No acute findings

## 2022-02-21 ENCOUNTER — EVALUATION (OUTPATIENT)
Dept: PHYSICAL THERAPY | Facility: CLINIC | Age: 52
End: 2022-02-21
Payer: COMMERCIAL

## 2022-02-21 DIAGNOSIS — G89.29 CHRONIC LEFT SHOULDER PAIN: Primary | ICD-10-CM

## 2022-02-21 DIAGNOSIS — M75.32 CALCIFIC TENDONITIS OF LEFT SHOULDER: ICD-10-CM

## 2022-02-21 DIAGNOSIS — M25.512 CHRONIC LEFT SHOULDER PAIN: Primary | ICD-10-CM

## 2022-02-21 PROCEDURE — 97161 PT EVAL LOW COMPLEX 20 MIN: CPT | Performed by: PHYSICAL THERAPIST

## 2022-02-21 PROCEDURE — 97112 NEUROMUSCULAR REEDUCATION: CPT | Performed by: PHYSICAL THERAPIST

## 2022-02-21 NOTE — PROGRESS NOTES
PT Evaluation     Today's date: 2022  Patient name: Merlin Bethea  : 1970  MRN: [id number]  Referring provider: Eric Hancock MD  Dx:   Encounter Diagnosis     ICD-10-CM    1  Chronic left shoulder pain  M25 512 Ambulatory Referral to Physical Therapy    G89 29    2  Calcific tendonitis of left shoulder  M75 32 Ambulatory Referral to Physical Therapy                  Assessment  Assessment details: Merlin Bethea is a 46y o  year old female presenting to PT with pain, decreased range of motion, decreased strength, and decreased tolerance to activity  Signs and symptoms are consistent with referring diagnosis of chronic shoulder pain with tendonitis  The existing impairments result in difficulty lifting, carrying, reaching and sleeping  Candy Litten would benefit from skilled PT services to address these issues and to maximize function  Home exercise provided and all questions answered  Thank you for the referral     Impairments: activity intolerance and poor posture     Goals  STG 2-4 weeks  Increase UE strenth 5-10#  Decrease pain to <5/10 with activity  Increase UE PROM to [facility] all planes    LTG 6-8 weeks  Demonstrate UE AROM WFL all planes  Decrease pain to <2/10 with activity  Patient independent with HEP      Plan  Planned therapy interventions: manual therapy, neuromuscular re-education, postural training, strengthening, stretching, therapeutic activities and therapeutic exercise  Frequency: 2x week  Duration in weeks: 6        Subjective Evaluation    History of Present Illness  Mechanism of injury: Left shoulder pain, insidious onset beginning n 2021  Worse with sleeping on her side, lifting/carrying a bag or groceries  Worse with reaching behind her back  Often holds her phone on her left shoulder at work - working on reducing her posture       Feels better with supper under her arm when laying down  Pain  Current pain ratin  At best pain ratin  At worst pain ratin  Quality: dull ache  Aggravating factors: lifting and overhead activity  Progression: no change          Objective     Postural Observations    Additional Postural Observation Details  Forward shoulder posture at all times    Palpation   Left   Tenderness of the pectoralis major and upper trapezius  Tenderness     Left Shoulder   Tenderness in the clavicle and coracoid process  Active Range of Motion   Left Shoulder   Internal rotation BTB: L5     Tests     Left Shoulder   Positive crossover and ULTT1  Negative drop arm                Precautions: na      Manuals 2/21            FMT LUQ - thoracic outlet 15'                                                   Neuro Re-Ed             LTR UE abducted 10x            ULT at wall 5"x10            UT elongation in supine 10"x10                                                                Ther Ex                                                                                                                     Ther Activity                                       Gait Training                                       Modalities

## 2022-02-25 ENCOUNTER — APPOINTMENT (OUTPATIENT)
Dept: PHYSICAL THERAPY | Facility: CLINIC | Age: 52
End: 2022-02-25
Payer: COMMERCIAL

## 2022-02-28 ENCOUNTER — OFFICE VISIT (OUTPATIENT)
Dept: PHYSICAL THERAPY | Facility: CLINIC | Age: 52
End: 2022-02-28
Payer: COMMERCIAL

## 2022-02-28 DIAGNOSIS — M25.512 CHRONIC LEFT SHOULDER PAIN: Primary | ICD-10-CM

## 2022-02-28 DIAGNOSIS — M75.32 CALCIFIC TENDONITIS OF LEFT SHOULDER: ICD-10-CM

## 2022-02-28 DIAGNOSIS — G89.29 CHRONIC LEFT SHOULDER PAIN: Primary | ICD-10-CM

## 2022-02-28 PROCEDURE — 97140 MANUAL THERAPY 1/> REGIONS: CPT | Performed by: PHYSICAL THERAPIST

## 2022-02-28 PROCEDURE — 97112 NEUROMUSCULAR REEDUCATION: CPT | Performed by: PHYSICAL THERAPIST

## 2022-02-28 NOTE — PROGRESS NOTES
Daily Note     Today's date: 2022  Patient name: Porsha Chavarria  : 1970  MRN: [id number]  Referring provider: Louie Yeh MD  Dx:   Encounter Diagnosis     ICD-10-CM    1  Chronic left shoulder pain  M25 512     G89 29    2  Calcific tendonitis of left shoulder  M75 32                   Subjective: Having some good days and bad days  At times she might feel worse after stretching HEP  Objective: See treatment diary below      Assessment: Tolerated treatment well and improved symptoms with 1 rib mobilization  Patient demonstrated fatigue post treatment and would benefit from continued PT      Plan: Continue per plan of care  Progress treatment as tolerated         Precautions: na      Manuals            FMT LUQ - thoracic outlet 15' 15'           1 rib  JL                                     Neuro Re-Ed             LTR UE abducted 10x + head turns 5:x10           ULT at wall 5"x10            UT elongation in supine 10"x10 10:x10           LTR 1/2 roll  10"x10           Low trap, ER  GTB 10:x10                                     Ther Ex                                                                                                                     Ther Activity                                       Gait Training                                       Modalities

## 2022-03-03 ENCOUNTER — OFFICE VISIT (OUTPATIENT)
Dept: PHYSICAL THERAPY | Facility: CLINIC | Age: 52
End: 2022-03-03
Payer: COMMERCIAL

## 2022-03-03 DIAGNOSIS — M75.32 CALCIFIC TENDONITIS OF LEFT SHOULDER: ICD-10-CM

## 2022-03-03 DIAGNOSIS — G89.29 CHRONIC LEFT SHOULDER PAIN: Primary | ICD-10-CM

## 2022-03-03 DIAGNOSIS — M25.512 CHRONIC LEFT SHOULDER PAIN: Primary | ICD-10-CM

## 2022-03-03 PROCEDURE — 97140 MANUAL THERAPY 1/> REGIONS: CPT | Performed by: PHYSICAL THERAPIST

## 2022-03-03 PROCEDURE — 97112 NEUROMUSCULAR REEDUCATION: CPT | Performed by: PHYSICAL THERAPIST

## 2022-03-03 NOTE — PROGRESS NOTES
Daily Note     Today's date: 3/3/2022  Patient name: Meghan Barkley  : 1970  MRN: [id number]  Referring provider: Marjorie Lynch MD  Dx:   Encounter Diagnosis     ICD-10-CM    1  Chronic left shoulder pain  M25 512     G89 29    2  Calcific tendonitis of left shoulder  M75 32                   Subjective: Feeling better following a massage and previous PT appointment  Minimal symptoms into her left hand, has not taken pain medication for several days      Objective: See treatment diary below      Assessment: Tolerated treatment well and continues to improve  Peripheral ulnar nerve mobilty included today  Patient demonstrated fatigue post treatment and would benefit from continued PT      Plan: Continue per plan of care  Progress treatment as tolerated         Precautions: na      Manuals 2/21 2/28 3/2          FMT LUQ - thoracic outlet 15' 15' JL          1 rib  JL JL                                    Neuro Re-Ed             LTR UE abducted 10x + head turns 5:x10 hands behind head 10:x10          ULT at wall 5"x10            UT elongation in supine 10"x10 10:x10           LTR / roll  10"x10           Low trap, ER  GTB 10:x10 JL          Scalene stretch seated   20"x3                       Ther Ex                                                                                                                     Ther Activity                                       Gait Training                                       Modalities

## 2022-03-07 ENCOUNTER — OFFICE VISIT (OUTPATIENT)
Dept: PHYSICAL THERAPY | Facility: CLINIC | Age: 52
End: 2022-03-07
Payer: COMMERCIAL

## 2022-03-07 ENCOUNTER — TELEPHONE (OUTPATIENT)
Dept: FAMILY MEDICINE CLINIC | Facility: CLINIC | Age: 52
End: 2022-03-07

## 2022-03-07 DIAGNOSIS — M75.32 CALCIFIC TENDONITIS OF LEFT SHOULDER: ICD-10-CM

## 2022-03-07 DIAGNOSIS — G89.29 CHRONIC LEFT SHOULDER PAIN: Primary | ICD-10-CM

## 2022-03-07 DIAGNOSIS — M25.512 CHRONIC LEFT SHOULDER PAIN: Primary | ICD-10-CM

## 2022-03-07 PROCEDURE — 97140 MANUAL THERAPY 1/> REGIONS: CPT | Performed by: PHYSICAL THERAPIST

## 2022-03-07 PROCEDURE — 97112 NEUROMUSCULAR REEDUCATION: CPT | Performed by: PHYSICAL THERAPIST

## 2022-03-07 NOTE — PROGRESS NOTES
Daily Note     Today's date: 3/7/2022  Patient name: Nubia Ely  : 1970  MRN: [id number]  Referring provider: Sonam Webb MD  Dx:   Encounter Diagnosis     ICD-10-CM    1  Chronic left shoulder pain  M25 512     G89 29    2  Calcific tendonitis of left shoulder  M75 32                   Subjective: Anterior shoulder pain has returned and persisted all weekend  She believes pain returned as soon as she left PT,  Also noticing return of left 3-5 digit coldness and numbness  Objective: See treatment diary below      Assessment: Tolerated treatment fair and reviewed seated posture  Decreased paresthesias with posture correction but continues to feel "coolness" in the 3-5 digit  Patient demonstrated fatigue post treatment and would benefit from continued PT      Plan: Continue per plan of care  Progress treatment as tolerated         Precautions: na      Manuals 2/21 2/28 3/2 3/7         FMT LUQ - thoracic outlet 15' 15' JL JL         1 rib  JL JL JL                                   Neuro Re-Ed             LTR UE abducted 10x + head turns 5:x10 hands behind head 10:x10          ULT at wall 5"x10            UT elongation in supine 10"x10 10:x10           LTR / roll  10"x10           Low trap, ER  GTB 10:x10 JL          Scalene stretch seated   20"x3 JL         Posture ed    10'         Ther Ex                                                                                                                     Ther Activity                                       Gait Training                                       Modalities

## 2022-03-10 ENCOUNTER — APPOINTMENT (OUTPATIENT)
Dept: PHYSICAL THERAPY | Facility: CLINIC | Age: 52
End: 2022-03-10
Payer: COMMERCIAL

## 2022-03-14 ENCOUNTER — OFFICE VISIT (OUTPATIENT)
Dept: PHYSICAL THERAPY | Facility: CLINIC | Age: 52
End: 2022-03-14
Payer: COMMERCIAL

## 2022-03-14 DIAGNOSIS — M75.32 CALCIFIC TENDONITIS OF LEFT SHOULDER: ICD-10-CM

## 2022-03-14 DIAGNOSIS — M25.512 CHRONIC LEFT SHOULDER PAIN: Primary | ICD-10-CM

## 2022-03-14 DIAGNOSIS — G89.29 CHRONIC LEFT SHOULDER PAIN: Primary | ICD-10-CM

## 2022-03-14 PROCEDURE — 97140 MANUAL THERAPY 1/> REGIONS: CPT

## 2022-03-14 PROCEDURE — 97112 NEUROMUSCULAR REEDUCATION: CPT

## 2022-03-14 NOTE — PROGRESS NOTES
Daily Note     Today's date: 3/14/2022  Patient name: Mary Bethea  : 1970  MRN: [id number]  Referring provider: Riya Kennedy MD  Dx:   Encounter Diagnosis     ICD-10-CM    1  Chronic left shoulder pain  M25 512     G89 29    2  Calcific tendonitis of left shoulder  M75 32                   Subjective:  Pt denies increase symptoms after last visit  Pt c/o increase pain after reaching activity yesterday and states she had "pressure" b/l anterior cerv region that makes her feel like she is going to pass out  Cont'd c/o numb/cold numb feeling left 3-5 digits and pain left anterior shoulder stating pain level 4/10 pre treatment  Pt reports trying to be more aware of posture  Objective: See treatment diary below      Assessment: Tolerated treatment well  Moderate TTP/tightness noted left scalenes, upper trap and pec regions  Pt cont's to benefit from postural education  Good tolerance to manual therapy and exercise as noted without increase symptoms  Patient demonstrated fatigue post treatment and would benefit from continued PT      Plan: Continue per plan of care  Progress treatment as tolerated  Pt has MD appt 3/18/22         Precautions: na      Manuals 2/21 2/28 3/2 3/7 3/14        FMT LUQ - thoracic outlet 15' 15' JL JL [address]        1 rib  JL JL JL GH                                  Neuro Re-Ed             LTR UE abducted 10x + head turns 5:x10 hands behind head 10:x10  LTR to R/ arm out to side 10"x  10        ULT at wall 5"x10            UT elongation in supine 10"x10 10:x10   10"x5 L        LTR 1/2 roll  10"x10           Low trap, ER  GTB 10:x10 JL          Scalene stretch seated   20"x3 JL 10"x5 L        Posture ed    10' [address]        Ther Ex                                                                                                                     Ther Activity                                       Gait Training                                       Modalities

## 2022-03-17 ENCOUNTER — OFFICE VISIT (OUTPATIENT)
Dept: OBGYN CLINIC | Facility: MEDICAL CENTER | Age: 52
End: 2022-03-17
Payer: COMMERCIAL

## 2022-03-17 ENCOUNTER — APPOINTMENT (OUTPATIENT)
Dept: PHYSICAL THERAPY | Facility: CLINIC | Age: 52
End: 2022-03-17
Payer: COMMERCIAL

## 2022-03-17 VITALS
SYSTOLIC BLOOD PRESSURE: 122 MMHG | BODY MASS INDEX: 39.75 KG/M2 | WEIGHT: 216 LBS | HEIGHT: 62 IN | TEMPERATURE: 98.3 F | HEART RATE: 80 BPM | DIASTOLIC BLOOD PRESSURE: 78 MMHG

## 2022-03-17 DIAGNOSIS — G89.29 CHRONIC LEFT SHOULDER PAIN: Primary | ICD-10-CM

## 2022-03-17 DIAGNOSIS — M19.012 OSTEOARTHRITIS OF LEFT AC (ACROMIOCLAVICULAR) JOINT: ICD-10-CM

## 2022-03-17 DIAGNOSIS — M25.512 CHRONIC LEFT SHOULDER PAIN: Primary | ICD-10-CM

## 2022-03-17 DIAGNOSIS — M50.30 DEGENERATIVE CERVICAL DISC: ICD-10-CM

## 2022-03-17 DIAGNOSIS — M75.32 CALCIFIC TENDONITIS OF LEFT SHOULDER: ICD-10-CM

## 2022-03-17 PROCEDURE — 99213 OFFICE O/P EST LOW 20 MIN: CPT | Performed by: EMERGENCY MEDICINE

## 2022-03-17 PROCEDURE — 20610 DRAIN/INJ JOINT/BURSA W/O US: CPT | Performed by: EMERGENCY MEDICINE

## 2022-03-17 RX ORDER — LIDOCAINE HYDROCHLORIDE 10 MG/ML
4 INJECTION, SOLUTION INFILTRATION; PERINEURAL
Status: COMPLETED | OUTPATIENT
Start: 2022-03-17 | End: 2022-03-17

## 2022-03-17 RX ORDER — TRIAMCINOLONE ACETONIDE 40 MG/ML
40 INJECTION, SUSPENSION INTRA-ARTICULAR; INTRAMUSCULAR
Status: COMPLETED | OUTPATIENT
Start: 2022-03-17 | End: 2022-03-17

## 2022-03-17 RX ADMIN — TRIAMCINOLONE ACETONIDE 40 MG: 40 INJECTION, SUSPENSION INTRA-ARTICULAR; INTRAMUSCULAR at 10:27

## 2022-03-17 RX ADMIN — LIDOCAINE HYDROCHLORIDE 4 ML: 10 INJECTION, SOLUTION INFILTRATION; PERINEURAL at 10:27

## 2022-03-17 NOTE — PATIENT INSTRUCTIONS
You may use Advil (ibuprofen) 600mg every 6 hours OR Aleve (naproxen) 250-500mg every 12 hours as needed for pain and inflammation  You may also take Tylenol 500mg every 4-6 hours as needed OR max 1,000mg per dose up to 3 times per day for a total of 3,000mg per day  Check with your primary care physician to see if these medications are safe to take and to make sure they do not interfere with your other medications and medical issues  Steroid Joint Injection   AMBULATORY CARE:   What you need to know about steroid joint injection:  A steroid joint injection is a procedure to inject steroid medicine into a joint  Steroid medicine decreases pain and inflammation  The injection may also contain an anesthetic (numbing medicine) to decrease pain  It may be done to treat conditions such as arthritis, gout, or carpal tunnel syndrome  The injections may be given in your knee, ankle, shoulder, elbow, or wrist  Injections may also be given in your hip, toe, thumb, or finger  How to prepare for steroid joint injection:  Your healthcare provider will talk to you about how to prepare for this procedure  He will tell you what medicines to take or not take on the day of your procedure  You may need to stop taking blood thinners several days before your procedure  What will happen during steroid joint injection:  You may be given local anesthesia to numb the area where the injection will be given  With local anesthesia, you may still feel pressure during the procedure, but you should not feel any pain  Your healthcare provider may use ultrasound or fluoroscopy (a type of x-ray) to guide the needle to the right area  He will then inject the steroid into your joint  A bandage will be placed on the injection site  What will happen after steroid joint injection:  You may have redness, warmth, or sweating in your face and chest right after the steroid injection  Steroids can affect blood sugar levels   If you have diabetes, you should check your blood sugars closely in the first 24 hours after your procedure  Risks of steroid joint injection:  You may get an infection in your joint  The injection may also cause more pain during the first 24 to 36 hours  You may need more than one injection to feel pain relief  The skin near the injection site may be damaged and become discolored or indented  This can happen if the steroid is placed too close to your skin  A tendon near the injection site may rupture or a nerve can be damaged  Contact your healthcare provider if:   · You have fever or chills  · You have redness or swelling in the injection site  · You have more pain than usual in your joint for more than 72 hours  · You have questions or concerns about your condition or care  Medicines:   · Pain medicine  may be given  Ask how to take this medicine safely  · Take your medicine as directed  Contact your healthcare provider if you think your medicine is not helping or if you have side effects  Tell him or her if you are allergic to any medicine  Keep a list of the medicines, vitamins, and herbs you take  Include the amounts, and when and why you take them  Bring the list or the pill bottles to follow-up visits  Carry your medicine list with you in case of an emergency  Self-care:   · Leave the bandage on for 8 to 12 hours  Care for your wound as directed  · Rest the area  as directed  You may need to decrease weight on certain joints, such as the knee, for a period of time  Ask when you can return to your daily activities  · Elevate  your limb where the steroid injection was given  Elevate the limb above the level of your heart as often as you can  This will help decrease swelling and pain  Prop your limb on pillows or blankets to keep it elevated comfortably  · Apply ice  on your joint for 15 to 20 minutes every hour or as directed  Use an ice pack, or put crushed ice in a plastic bag  Cover it with a towel  Ice helps prevent tissue damage and decreases swelling and pain  Follow up with your doctor as directed:  Write down your questions so you remember to ask them during your visits  © Copyright [employer] 2022 Information is for End User's use only and may not be sold, redistributed or otherwise used for commercial purposes  All illustrations and images included in CareNotes® are the copyrighted property of A D A M , Inc  or [facility] Carissa Henry   The above information is an  only  It is not intended as medical advice for individual conditions or treatments  Talk to your doctor, nurse or pharmacist before following any medical regimen to see if it is safe and effective for you

## 2022-03-17 NOTE — PROGRESS NOTES
Assessment/Plan:    Diagnoses and all orders for this visit:    Chronic left shoulder pain  -     Large joint arthrocentesis: L subacromial bursa    Calcific tendonitis of left shoulder  -     Large joint arthrocentesis: L subacromial bursa    Osteoarthritis of left AC (acromioclavicular) joint  -     Large joint arthrocentesis: L subacromial bursa    Degenerative cervical disc    Left shoulder subacromial steroid injection provided for shoulder pain with calcific tendonitis, with subsequent improved symptoms with cross arm test  Continue Massage, PT and naproxen  Symptoms possible C spine related, reviewed prior Xray  PT is concerned about possible TOS  Reviewed PT notes      Return in about 4 weeks (around 4/14/2022)  Chief Complaint:     Chief Complaint   Patient presents with    Left Shoulder - Follow-up       Subjective:   Patient ID: Merlin Bethea is a 46 y o  female  Patient returns having been participating in [place] mentioned to the patient she may have Thoracic outlet syndrome  She is experiencing pain of the left clavicle /chest she describes as burning, as well as pain of the left trapezius and left shoulder  She did regularly take Naproxen   She has already taken oral steroids in January    Initial note:  NP presents for Left shoulder pain since November along with decreased ROM particularly internal rotation and across body  Denies injury  Taking Tylenol and Ibuprofen only PRN  She did take Medrol dose pack previously for COVID in January but did not notice any benefit from this  Review of Systems    The following portions of the patient's chart were reviewed and updated as appropriate:    Allergy:  No Known Allergies      Past Medical History:   Diagnosis Date    Depression     Sleep apnea        Past Surgical History:   Procedure Laterality Date    MOUTH SURGERY      ROOT CANAL      Apicoectomy; resolved; 07/26/17    WISDOM TOOTH EXTRACTION      1980's       Social History Socioeconomic History    Marital status: /Civil Union     Spouse name: Not on file    Number of children: Not on file    Years of education: college student    Highest education level: Not on file   Occupational History    Occupation: employed   Tobacco Use    Smoking status: Never Smoker    Smokeless tobacco: Never Used   Vaping Use    Vaping Use: Never used   Substance and Sexual Activity    Alcohol use: Yes     Comment: occassional    Drug use: Never    Sexual activity: Yes     Partners: Male     Birth control/protection: Male Sterilization, None   Other Topics Concern    Not on file   Social History Narrative    Daily coffee consumption 2 cups a day    Household:  Mother     Social Determinants of Health     Financial Resource Strain: Not on file   Food Insecurity: Not on file   Transportation Needs: Not on file   Physical Activity: Not on file   Stress: Not on file   Social Connections: Not on file   Intimate Partner Violence: Not on file   Housing Stability: Not on file       Family History   Problem Relation Age of Onset    Hypertension Mother    [place]ie [place] Rheum arthritis Mother     Diverticulosis Mother         plus diverticulitis    Cirrhosis Father     Leukemia Father 61        ALL    Diabetes Maternal Grandmother         mellitus    Hypertension Maternal Grandmother         pulmonary    Cancer Maternal Grandfather 48        face/throat    Anxiety disorder Paternal Grandmother     Depression Paternal Grandmother     Dementia Paternal Grandmother     Glaucoma Paternal Grandmother     Hypertension Paternal Grandmother     Lung cancer Paternal Grandfather 80    Heart attack Paternal Grandfather     No Known Problems Sister     No Known Problems Daughter        Medications:    Current Outpatient Medications:     clobetasol (TEMOVATE) 0 05 % cream, Apply topically 2 (two) times a day (Patient taking differently: Apply topically as needed ), Disp: 30 g, Rfl: 0    Ibuprofen (ADVIL PO), Take by mouth as needed, Disp: , Rfl:     magnesium 30 MG tablet, Take 30 mg by mouth as needed , Disp: , Rfl:     Multiple Vitamin (MULTIVITAMIN) tablet, Take 1 tablet by mouth as needed , Disp: , Rfl:     VITAMIN D PO, Take 5,000 Units by mouth as needed , Disp: , Rfl:     Patient Active Problem List   Diagnosis    Depression, major, recurrent (HCC)    Eczema    Enlarged uterus    Fatigue    Hand dermatitis    Irregular menses    Obesity with body mass index 30 or greater    Spider vein, symptomatic    Spider veins of both lower extremities    Vertigo    Weight gain    Arthralgia    Obstructive sleep apnea treated with continuous positive airway pressure (CPAP)    Vitamin D deficiency    Candidal intertrigo    Venous insufficiency of both lower extremities    Abnormal TSH    Thyroid antibody positive       Objective:  /78   Pulse 80   Temp 98 3 °F (36 8 °C)   Ht 5' 2" (1 575 m)   Wt 98 kg (216 lb)   BMI 39 51 kg/m²     Left Shoulder Exam     Tests   Cross arm: positive    Other   Erythema: absent             Physical Exam      Neurologic Exam    Large joint arthrocentesis: L subacromial bursa  Universal Protocol:  Consent: Verbal consent obtained  Risks and benefits: risks, benefits and alternatives were discussed  Consent given by: patient  Time out: Immediately prior to procedure a "time out" was called to verify the correct patient, procedure, equipment, support staff and site/side marked as required    Timeout called at: 3/17/2022 10:26 AM   Patient understanding: patient states understanding of the procedure being performed  Test results: test results available and properly labeled  Site marked: the operative site was marked  Patient identity confirmed: verbally with patient    Supporting Documentation  Indications: pain   Procedure Details  Location: shoulder - L subacromial bursa  Preparation: Patient was prepped and draped in the usual sterile fashion  Needle size: 22 G  Ultrasound guidance: no  Approach: anterolateral  Medications administered: 4 mL lidocaine 1 %; 40 mg triamcinolone acetonide 40 mg/mL    Patient tolerance: patient tolerated the procedure well with no immediate complications  Dressing:  Sterile dressing applied          I have personally reviewed the written report of the pertinent studies

## 2022-03-21 ENCOUNTER — OFFICE VISIT (OUTPATIENT)
Dept: PHYSICAL THERAPY | Facility: CLINIC | Age: 52
End: 2022-03-21
Payer: COMMERCIAL

## 2022-03-21 DIAGNOSIS — M75.32 CALCIFIC TENDONITIS OF LEFT SHOULDER: ICD-10-CM

## 2022-03-21 DIAGNOSIS — G89.29 CHRONIC LEFT SHOULDER PAIN: Primary | ICD-10-CM

## 2022-03-21 DIAGNOSIS — M25.512 CHRONIC LEFT SHOULDER PAIN: Primary | ICD-10-CM

## 2022-03-21 PROCEDURE — 97112 NEUROMUSCULAR REEDUCATION: CPT | Performed by: PHYSICAL THERAPIST

## 2022-03-21 PROCEDURE — 97140 MANUAL THERAPY 1/> REGIONS: CPT | Performed by: PHYSICAL THERAPIST

## 2022-03-21 NOTE — PROGRESS NOTES
Daily Note     Today's date: 3/21/2022  Patient name: Sheng Sorto  : 1970  MRN: [id number]  Referring provider: Dariana Malone MD  Dx:   Encounter Diagnosis     ICD-10-CM    1  Chronic left shoulder pain  M25 512     G89 29    2  Calcific tendonitis of left shoulder  M75 32                   Subjective: So far no change in her symptoms s/p injection last Thursday  She continues to have intermittent shoulder pain with hand numbness  Objective: See treatment diary below      Assessment: Tolerated treatment fair and slight improvements in IR following subscap work today, trial 2 rib mobilization next visit    Patient would benefit from continued PT      Plan: Continue per plan of care        Precautions: na      Manuals 2/21 2/28 3/2 3/7 3/14 3/21       FMT LUQ - thoracic outlet 15' 13' JL JL GH JL       1 rib  JL JL JL GH JL                                 Neuro Re-Ed             LTR UE abducted 10x + head turns 5:x10 hands behind head 10:x10  LTR to R/ arm out to side 10"x  10 JL       ULT at wall 5"x10            UT elongation in supine 10"x10 10:x10   10"x5 L        LTR  roll  10"x10           Low trap, ER  GTB 10:x10 JL          Scalene stretch seated   20"x3 JL 10"x5 L JL       Posture ed    10' [facility] JL       Ther Ex                                                                                                                     Ther Activity                                       Gait Training                                       Modalities

## 2022-03-24 ENCOUNTER — APPOINTMENT (OUTPATIENT)
Dept: PHYSICAL THERAPY | Facility: CLINIC | Age: 52
End: 2022-03-24
Payer: COMMERCIAL

## 2022-03-28 ENCOUNTER — APPOINTMENT (OUTPATIENT)
Dept: PHYSICAL THERAPY | Facility: CLINIC | Age: 52
End: 2022-03-28
Payer: COMMERCIAL

## 2022-03-31 ENCOUNTER — OFFICE VISIT (OUTPATIENT)
Dept: PHYSICAL THERAPY | Facility: CLINIC | Age: 52
End: 2022-03-31
Payer: COMMERCIAL

## 2022-03-31 DIAGNOSIS — M25.512 CHRONIC LEFT SHOULDER PAIN: Primary | ICD-10-CM

## 2022-03-31 DIAGNOSIS — M75.32 CALCIFIC TENDONITIS OF LEFT SHOULDER: ICD-10-CM

## 2022-03-31 DIAGNOSIS — G89.29 CHRONIC LEFT SHOULDER PAIN: Primary | ICD-10-CM

## 2022-03-31 PROCEDURE — 97112 NEUROMUSCULAR REEDUCATION: CPT | Performed by: PHYSICAL THERAPIST

## 2022-03-31 PROCEDURE — 97140 MANUAL THERAPY 1/> REGIONS: CPT | Performed by: PHYSICAL THERAPIST

## 2022-03-31 NOTE — PROGRESS NOTES
Daily Note     Today's date: 3/31/2022  Patient name: Yamilka Dobbins  : 1970  MRN: [id number]  Referring provider: Carli Das MD  Dx:   Encounter Diagnosis     ICD-10-CM    1  Chronic left shoulder pain  M25 512     G89 29    2  Calcific tendonitis of left shoulder  M75 32                   Subjective: Notices improvement after massage again  She is planning to trial chiropractor again to address thoracic tightness      Objective: See treatment diary below      Assessment: Tolerated treatment fair and feels better with inreased focus on scapula mobility and muscle imballances  Fair tolerance for thoracic HVLA, but difficulty positioning her left shoulder in a way that is not aggravating    Patient demonstrated fatigue post treatment and would benefit from continued PT      Plan: Continue per plan of care  Progress treatment as tolerated         Precautions: na      Manuals 2/21 2/28 3/2 3/7 3/14 3/21 3/31      FMT LUQ - thoracic outlet 15' 13' JL JL GH JL JL      1 rib  JL JL JL GH JL       T spine HVLA       JL                   Neuro Re-Ed             LTR UE abducted 10x + head turns 5:x10 hands behind head 10:x10  LTR to R/ arm out to side 10"x  10 JL JL      ULT at wall 5"x10            UT elongation in supine 10"x10 10:x10   10"x5 L        Education foam roll thoracic spine       5'      Low trap, ER  GTB 10:x10 JL          Scalene stretch seated   20"x3 JL 10"x5 L JL       Posture ed    10' [facility] JL       Ther Ex                                                                                                                     Ther Activity                                       Gait Training                                       Modalities

## 2022-04-04 ENCOUNTER — OFFICE VISIT (OUTPATIENT)
Dept: PHYSICAL THERAPY | Facility: CLINIC | Age: 52
End: 2022-04-04
Payer: COMMERCIAL

## 2022-04-04 DIAGNOSIS — G89.29 CHRONIC LEFT SHOULDER PAIN: Primary | ICD-10-CM

## 2022-04-04 DIAGNOSIS — M25.512 CHRONIC LEFT SHOULDER PAIN: Primary | ICD-10-CM

## 2022-04-04 DIAGNOSIS — M75.32 CALCIFIC TENDONITIS OF LEFT SHOULDER: ICD-10-CM

## 2022-04-04 PROCEDURE — 97112 NEUROMUSCULAR REEDUCATION: CPT

## 2022-04-04 PROCEDURE — 97140 MANUAL THERAPY 1/> REGIONS: CPT

## 2022-04-04 NOTE — PROGRESS NOTES
Daily Note     Today's date: 2022  Patient name: Jessica Rain  : 1970  MRN: [id number]  Referring provider: Kimmie Chung MD  Dx:   Encounter Diagnosis     ICD-10-CM    1  Chronic left shoulder pain  M25 512     G89 29    2  Calcific tendonitis of left shoulder  M75 32                   Subjective:  Pt cont's to c/o intermittent left hand paresthesias and left shoulder/scapular pain  Pt reports continued limitation with left shoulder mobility and difficulty reaching behind her back  Objective: See treatment diary below      Assessment: Tolerated treatment well  Pt responded well to manual therapy, scapular/thoracic mobility and postural exercise with decrease tightness reported post treatment  Patient demonstrated fatigue post treatment and would benefit from continued PT      Plan: Continue per plan of care  Progress treatment as tolerated  Pt has MD appt 22         Precautions: na      Manuals 2/21 2/28 3/2 3/7 3/14 3/21 3/31 4/4     FMT LUQ - thoracic outlet 15' 15' JL JL  [facility] JL GH     1 rib  JL JL JL GH JL       T spine HVLA       JL STM GH                  Neuro Re-Ed             LTR UE abducted 10x + head turns 5:x10 hands behind head 10:x10  LTR to R/ arm out to side 10"x  10 JL JL GH     ULT at wall 5"x10            UT elongation in supine 10"x10 10:x10   10"x5 L        Education foam roll thoracic spine       5' [address] [facility]     Low trap, ER  GTB 10:x10 JL          Scalene stretch seated   20"x3 JL 10"x5 L JL       Posture ed    [address] [facility] JL       Ther Ex                                                                                                                     Ther Activity                                       Gait Training                                       Modalities

## 2022-04-06 ENCOUNTER — OFFICE VISIT (OUTPATIENT)
Dept: OBGYN CLINIC | Facility: MEDICAL CENTER | Age: 52
End: 2022-04-06
Payer: COMMERCIAL

## 2022-04-06 VITALS
DIASTOLIC BLOOD PRESSURE: 71 MMHG | BODY MASS INDEX: 39.75 KG/M2 | WEIGHT: 216 LBS | HEART RATE: 79 BPM | HEIGHT: 62 IN | SYSTOLIC BLOOD PRESSURE: 109 MMHG

## 2022-04-06 DIAGNOSIS — M75.32 CALCIFIC TENDONITIS OF LEFT SHOULDER: ICD-10-CM

## 2022-04-06 DIAGNOSIS — M19.012 OSTEOARTHRITIS OF LEFT AC (ACROMIOCLAVICULAR) JOINT: ICD-10-CM

## 2022-04-06 DIAGNOSIS — R20.2 NUMBNESS AND TINGLING IN LEFT HAND: ICD-10-CM

## 2022-04-06 DIAGNOSIS — M54.12 RADICULOPATHY, CERVICAL REGION: ICD-10-CM

## 2022-04-06 DIAGNOSIS — R20.0 NUMBNESS AND TINGLING IN LEFT HAND: ICD-10-CM

## 2022-04-06 DIAGNOSIS — G89.29 CHRONIC LEFT SHOULDER PAIN: Primary | ICD-10-CM

## 2022-04-06 DIAGNOSIS — M25.512 CHRONIC LEFT SHOULDER PAIN: Primary | ICD-10-CM

## 2022-04-06 DIAGNOSIS — M50.30 DEGENERATIVE CERVICAL DISC: ICD-10-CM

## 2022-04-06 PROCEDURE — 99214 OFFICE O/P EST MOD 30 MIN: CPT | Performed by: EMERGENCY MEDICINE
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
## 2022-04-07 ENCOUNTER — OFFICE VISIT (OUTPATIENT)
Dept: PHYSICAL THERAPY | Facility: CLINIC | Age: 52
End: 2022-04-07
Payer: COMMERCIAL

## 2022-04-07 DIAGNOSIS — G89.29 CHRONIC LEFT SHOULDER PAIN: Primary | ICD-10-CM

## 2022-04-07 DIAGNOSIS — M25.512 CHRONIC LEFT SHOULDER PAIN: Primary | ICD-10-CM

## 2022-04-07 DIAGNOSIS — M75.32 CALCIFIC TENDONITIS OF LEFT SHOULDER: ICD-10-CM

## 2022-04-07 PROCEDURE — 97140 MANUAL THERAPY 1/> REGIONS: CPT | Performed by: PHYSICAL THERAPIST

## 2022-04-07 NOTE — PROGRESS NOTES
Daily Note     Today's date: 2022  Patient name: Lucille Ortiz  : 1970  MRN: [id number]  Referring provider: Javan Michele MD  Dx:   Encounter Diagnosis     ICD-10-CM    1  Chronic left shoulder pain  M25 512     G89 29    2  Calcific tendonitis of left shoulder  M75 32                   Subjective: Ortho follow up yesterday, MRI of neck and shoulder ordered      Objective: See treatment diary below      Assessment: Tolerated treatment fair and MT focus today on scap borders, neck and 1 rib  No pain at end of session, some left finger paresthesias in 4-5 digits  Patient would benefit from continued PT      Plan: Continue per plan of care        Precautions: na      Manuals 2/21 2/28 3/2 3/7 3/14 3/21 3/31 4/4 4/7    FMT LUQ - thoracic outlet 15' 15' [facility]L [facility]  [facility] JL GH JL    1 rib  JL JL JL GH JL   JL    T spine HVLA       JL STM GH                  Neuro Re-Ed             LTR UE abducted 10x + head turns 5:x10 hands behind head 10:x10  LTR to R/ arm out to side 10"x  10 JL JL GH     ULT at wall 5"x10            UT elongation in supine 10"x10 10:x10   10"x5 L        Education foam roll thoracic spine       5' 10'  [facility]     Low trap, ER  GTB 10:x10 JL          Scalene stretch seated   20"x3 JL 10"x5 L JL       Posture ed    10'  [facility] JL       Ther Ex                                                                                                                     Ther Activity                                       Gait Training                                       Modalities

## 2022-04-11 ENCOUNTER — OFFICE VISIT (OUTPATIENT)
Dept: PHYSICAL THERAPY | Facility: CLINIC | Age: 52
End: 2022-04-11
Payer: COMMERCIAL

## 2022-04-11 DIAGNOSIS — M75.32 CALCIFIC TENDONITIS OF LEFT SHOULDER: ICD-10-CM

## 2022-04-11 DIAGNOSIS — M25.512 CHRONIC LEFT SHOULDER PAIN: Primary | ICD-10-CM

## 2022-04-11 DIAGNOSIS — G89.29 CHRONIC LEFT SHOULDER PAIN: Primary | ICD-10-CM

## 2022-04-11 PROCEDURE — 97140 MANUAL THERAPY 1/> REGIONS: CPT | Performed by: PHYSICAL THERAPIST

## 2022-04-11 NOTE — PROGRESS NOTES
"Chief Complaint   Patient presents with   â¢ Diabetes     Last seen by Nelson Mattson on 5/2/2018       HISTORY OF PRESENT ILLNESS:  Fawad Flores ""Lennox"" is here for a diabetes follow-up she is in her trimester of her pregnancy and expected date of delivery is June 6, 2018. She had ultrasound done her baby is at 30th percentile for growth but she is due for repeat ultrasound and then decision regarding induction of labor will be made by her gynecologist.  She has noticed higher glucose readings recently. She has been taking bolus insulin at mealtimes and checking glucose regularly. Fluctuation in glucose readings are noted but no hypoglycemia episodes have occurred. She went feels well otherwise she is working third shift. Complications:   Retinopathy - []Â YES [x]Â NO   Last Diabetic Eye Exam: 09/15/2017  Nephropathy- [x]Â YES []Â NO microalbuminuria, patient confirmed was not menstruating at the time. Neuropathy- [x]Â YES []Â NO neuropathy - peripheral   Hypoglycemia: aware of treatment and carries quick acting carbohydrate for treatment  Insulin pump: Aware of what to do with unexplained with unexplained persistent high. REVIEW OF SYSTEMS:   Diabetes review: Type I (juvenile type) diabetes mellitus diagnosed at age 8 years treated with insulin pump therapy. No severe low blood sugars. Glucose testing: multiple times daily, she has an Accu-Check Robyn Plus glucometer that does not communicate with her pump, insurance is not covering the [facility]. Insulin injections: Novolog insulin via Medtronic insulin pump. Insulin pump data using [facility] reviewed  Diabetes education: with insulin pump therapy start. CONSTITUTIONAL: 3kg weight gain since March 2018 office visit. EYES: No blurred vision. RESPIRATORY: Nonsmoker. GENITOURINARY: Microalbuminuria, patient has confirmed was not menstruating at the time. NEUROLOGICAL: Occasional numbness to feet, currently asymptomatic.    PSYCHOLOGICAL: " Daily Note     Today's date: 2022  Patient name: Loida Fernando  : 1970  MRN: [id number]  Referring provider: Colt Jaquez MD  Dx:   Encounter Diagnosis     ICD-10-CM    1  Chronic left shoulder pain  M25 512     G89 29    2  Calcific tendonitis of left shoulder  M75 32                   Subjective: Feels she is beginning to make some progress with combination of PT, chiro and massage      Objective: See treatment diary below      Assessment: Tolerated treatment well and resumed nerve stretch at wall today with decreased tension  Patient demonstrated fatigue post treatment, exhibited good technique with therapeutic exercises and would benefit from continued PT      Plan: Continue per plan of care  Progress treatment as tolerated         Precautions: na      Manuals 2/21 2/28 3/2 3/7 3/14 3/21 3/31 4/4 4/7 4/11   FMT LUQ - thoracic outlet 15' 13' [facility]L [facility] JL JL   1 rib  JL JL JL GH JL   JL JL   T spine HVLA       JL STM GH                  Neuro Re-Ed             LTR UE abducted 10x + head turns 5:x10 hands behind head 10:x10  LTR to R/ arm out to side 10"x  10 JL JL GH     ULT at wall 5"x10         5'   UT elongation in supine 10"x10 10:x10   10"x5 L        Education foam roll thoracic spine       5' 10'  [facility]     Low trap, ER  GTB 10:x10 JL          Scalene stretch seated   20"x3 JL 10"x5 L JL       Posture ed    10' [address] [facility] JL       Ther Ex                                                                                                                     Ther Activity                                       Gait Training                                       Modalities History of anxiety and depression. ENDOCRINE:Â  Diabetes type I on insulin pump therapy. GYN currently in the third trimester of pregnancy expected date of delivery June 6, 2018. Outpatient Prescriptions Marked as Taking for the 5/7/18 encounter (Office Visit) with Alfredo De La Rosa MD   Medication Sig Dispense Refill   â¢ NOVOLOG 100 UNIT/ML injectable solution INJECT UP  UNITS DAILY VIA INSULIN PUMP  30 mL 3   â¢ acetaminophen (TYLENOL) 325 MG tablet Take 650 mg by mouth every 4 hours as needed for Pain. â¢ ondansetron (ZOFRAN) 8 MG tablet Take 1 tablet by mouth every 8 hours as needed for Nausea. 20 tablet 1   â¢ Cholecalciferol (VITAMIN D3) 2000 units capsule Take 2,000 Units by mouth daily. 100 capsule 2   â¢ Prenatal MV & Min w/FA-DHA (PRENATAL ADULT GUMMY/DHA/FA PO) Take 1 tablet by mouth daily. â¢ blood glucose (ACCU-CHEK CHRISTOPHER PLUS) test strip Test blood sugar 8 times daily as directed. Diagnosis: type 1 diabetes with pregnancy.  Meter: Accucheck Christopher 400 each 4      ALLERGIES:   Allergen Reactions   â¢ Adhesive RASH     Past Medical History:   Diagnosis Date   â¢ Anxiety disorder    â¢ Depressive disorder    â¢ Headache 2015   â¢ Migraines    â¢ Type I (juvenile type) diabetes mellitus without mention of complication, uncontrolled 1994    diagnosed at age 8 years     Past Surgical History:   Procedure Laterality Date   â¢ Eye surgery Left     lazy eye left    â¢ Tonsillectomy and adenoidectomy       Family History   Problem Relation Age of Onset   â¢ Diabetes Mother      type 2   â¢ Thyroid Mother      not sure what type   â¢ Migraine Mother    â¢ Arthritis Father      ankles, and tendonitis   â¢ Cancer, Ovarian Paternal Aunt    â¢ Stroke Paternal Grandfather    â¢ Hypertension Paternal Grandfather    â¢ Cancer, Breast Other      Social History   Substance Use Topics   â¢ Smoking status: Never Smoker   â¢ Smokeless tobacco: Never Used   â¢ Alcohol use No     PHYSICAL EXAMINATION:  Visit Vitals  /80 ([place] [facility] [place] Sitting, Cuff Size: Regular)   Pulse 100   Ht 5' 5"" (1.651 m)   Wt 93.9 kg   LMP 08/30/2017 (Exact Date)   BMI 34.45 kg/mÂ²     Body mass index is 34.45 kg/mÂ². Constitutional:  Well developed, well nourished, no acute distress, non-toxic appearance. Eyes:  Pupils equal, round, reactive to light. Conjunctivae normal.  HENT:  Atraumatic. External ears normal. Nose normal. Oropharynx moist.  Neck- normal range of motion, no tenderness, supple. Thyroid gland is not enlarged. Respiratory:  No respiratory distress, normal breath sounds, no rales, no wheezing. Cardiovascular:  Normal rate, normal rhythm. No murmurs, no gallops, no rubs. Abdomen:  Fundal height approximately 36 weeks. Musculoskeletal:  No edema, no tenderness, no deformities. Back- no tenderness. Integument:  Well hydrated, no rash. Lymphatic:  No anterior cervical lymphadenopathy noted. Neurologic:  Alert and oriented x 3. Cranial nerves 2-12 normal. Normal motor function. Normal sensory function. No focal deficits noted. Psychiatric:  Speech and behavior appropriate.     LABS:  Hemoglobin A1C (%)   Date Value   04/24/2018 7.7 (H)   01/29/2018 6.3 (H)   10/24/2017 7.4 (H)   10/05/2017 7.6 (H)   07/14/2017 8.0 (H)     TSH (mcUnits/mL)   Date Value   10/24/2017 2.514     MICROALBUMIN/CREATININE (mcg/mg)   Date Value   07/14/2017 80.9 (H)     Admission on 05/01/2018, Discharged on 05/02/2018   Component Date Value Ref Range Status   â¢ Glucose Bedside POC 05/01/2018 315* 65 - 99 mg/dL Final   â¢ Glucose Bedside POC 05/02/2018 282* 65 - 99 mg/dL Final     Sodium (mmol/L)   Date Value   10/24/2017 136     Potassium (mmol/L)   Date Value   10/24/2017 3.7     Chloride (mmol/L)   Date Value   10/24/2017 102     Glucose (mg/dL)   Date Value   10/24/2017 82     CALCIUM (mg/dL)   Date Value   10/24/2017 9.6     Carbon Dioxide (mmol/L)   Date Value   10/24/2017 25     BUN (mg/dL)   Date Value   10/24/2017 9     Creatinine (mg/dL) " Date Value   10/24/2017 0.49 (L)     CHOLESTEROL (mg/dL)   Date Value   11/22/2016 221 (H)     HDL (mg/dL)   Date Value   11/22/2016 69     CHOL/HDL (no units)   Date Value   11/22/2016 3.2     TRIGLYCERIDE (mg/dL)   Date Value   11/22/2016 161 (H)     CALCULATED LDL (mg/dL)   Date Value   11/22/2016 120     ASSESSMENT:  1. Insulin pump titration    2. Pre-existing type 1 diabetes mellitus with hyperglycemia during pregnancy in third trimester (CMS/[facility])      DISCUSSION:  I reviewed the pump download results with Paige, her glucose readings have been running high, her basal and bolus insulin rates were adjusted. I advised her to come in for pump upload in 2 weeks. She is working third shift so her timings are different for her meals. She was explained that postpartum insulin dose adjustment will be needed and she can come in for earlier follow-up as needed. PLAN:  Basal insulin dose has been increased today to 43.7 units. This should help improve diabetes control. Insulin carbohydrate ratio 1 unit for 9 grams of carbohydrate sensitivity remains same. Follow-up for pump upload in 2 weeks and every 2 weeks as needed. Regular office follow-up in 8 weeks.

## 2022-04-14 ENCOUNTER — OFFICE VISIT (OUTPATIENT)
Dept: PHYSICAL THERAPY | Facility: CLINIC | Age: 52
End: 2022-04-14
Payer: COMMERCIAL

## 2022-04-14 DIAGNOSIS — G89.29 CHRONIC LEFT SHOULDER PAIN: Primary | ICD-10-CM

## 2022-04-14 DIAGNOSIS — M75.32 CALCIFIC TENDONITIS OF LEFT SHOULDER: ICD-10-CM

## 2022-04-14 DIAGNOSIS — M25.512 CHRONIC LEFT SHOULDER PAIN: Primary | ICD-10-CM

## 2022-04-14 PROCEDURE — 97140 MANUAL THERAPY 1/> REGIONS: CPT | Performed by: PHYSICAL THERAPIST

## 2022-04-15 NOTE — PROGRESS NOTES
Daily Note     Today's date: 4/15/2022  Patient name: Onur Navarro  : 1970  MRN: [id number]  Referring provider: Jennifer Ordaz MD  Dx:   Encounter Diagnosis     ICD-10-CM    1  Chronic left shoulder pain  M25 512     G89 29    2  Calcific tendonitis of left shoulder  M75 32                   Subjective: Feeling a little better,  Notices conitnued muscle tension due to stress at work  Objective: See treatment diary below      Assessment: Tolerated treatment well and MT focus today throughout ulnar nerve pathway  Patient exhibited good technique with therapeutic exercises and would benefit from continued PT      Plan: Continue per plan of care  Progress treatment as tolerated         Precautions: na      Manuals 4/15 2/28 3/2 3/7 3/14 3/21 3/31 4/4 4/7 4/11   FMT LUQ - thoracic outlet 25 15' JL JL GH JL JL GH JL JL   1 rib 15 JL JL JL GH JL   JL JL   T spine HVLA       JL STM GH                  Neuro Re-Ed             LTR UE abducted  + head turns 5:x10 hands behind head 10:x10  LTR to R/ arm out to side 10"x  10 JL JL GH     ULT at wall          5'   UT elongation in supine  10:x10   10"x5 L        Education foam roll thoracic spine       5' 10' [facility]     Low trap, ER  GTB 10:x10 JL          Scalene stretch seated   20"x3 JL 10"x5 L JL       Posture ed    10' [facility] JL       Ther Ex                                                                                                                     Ther Activity                                       Gait Training                                       Modalities

## 2022-04-18 ENCOUNTER — APPOINTMENT (OUTPATIENT)
Dept: PHYSICAL THERAPY | Facility: CLINIC | Age: 52
End: 2022-04-18
Payer: COMMERCIAL

## 2022-04-21 ENCOUNTER — APPOINTMENT (OUTPATIENT)
Dept: PHYSICAL THERAPY | Facility: CLINIC | Age: 52
End: 2022-04-21
Payer: COMMERCIAL

## 2022-04-25 ENCOUNTER — APPOINTMENT (OUTPATIENT)
Dept: PHYSICAL THERAPY | Facility: CLINIC | Age: 52
End: 2022-04-25
Payer: COMMERCIAL

## 2022-04-26 ENCOUNTER — OFFICE VISIT (OUTPATIENT)
Dept: PHYSICAL THERAPY | Facility: CLINIC | Age: 52
End: 2022-04-26
Payer: COMMERCIAL

## 2022-04-26 DIAGNOSIS — G89.29 CHRONIC LEFT SHOULDER PAIN: Primary | ICD-10-CM

## 2022-04-26 DIAGNOSIS — M25.512 CHRONIC LEFT SHOULDER PAIN: Primary | ICD-10-CM

## 2022-04-26 DIAGNOSIS — M75.32 CALCIFIC TENDONITIS OF LEFT SHOULDER: ICD-10-CM

## 2022-04-26 PROCEDURE — 97112 NEUROMUSCULAR REEDUCATION: CPT

## 2022-04-26 PROCEDURE — 97140 MANUAL THERAPY 1/> REGIONS: CPT

## 2022-04-26 NOTE — PROGRESS NOTES
Daily Note     Today's date: 2022  Patient name: Jose Muhammad  : 1970  MRN: [id number]  Referring provider: Dwayne Harrison MD  Dx:   Encounter Diagnosis     ICD-10-CM    1  Chronic left shoulder pain  M25 512     G89 29    2  Calcific tendonitis of left shoulder  M75 32                   Subjective:  Pt reports continued muscle tension due to stress and busy schedule  MRI rescheduled for   Objective: See treatment diary below      Assessment: Tolerated treatment well  Good tolerance to manual therapy and exercise as noted without increase symptoms  Patient exhibited good technique with therapeutic exercises and would benefit from continued PT      Plan: Continue per plan of care  Progress treatment as tolerated         Precautions: na      Manuals 4/15 4/26 3/2 3/7 3/14 3/21 3/31 4/4 4/7 4/11   FMT LUQ - thoracic outlet 25 GH JL JL GH JL JL GH JL JL   1 rib 15 GH JL JL GH JL   JL JL   T spine HVLA  STM GH     JL STM GH                  Neuro Re-Ed             LTR UE abducted  10"x10 hands behind head 10:x10  LTR to R/ arm out to side 10"x  10 JL JL GH     ULT at wall  3'        5'   UT elongation in supine  10"x5 each with tc's   10"x5 L        Education foam roll thoracic spine  5'     5' 10' [address] [facility]     Low trap, ER   JL          Scalene stretch seated   20"x3 JL 10"x5 L JL       Posture ed    [id number] [facility] JL       Ther Ex             Self massage with ball at wall  3'                                                                                                      Ther Activity                                       Gait Training                                       Modalities

## 2022-05-04 ENCOUNTER — HOSPITAL ENCOUNTER (OUTPATIENT)
Dept: RADIOLOGY | Facility: IMAGING CENTER | Age: 52
Discharge: HOME | End: 2022-05-04
Payer: COMMERCIAL

## 2022-05-04 DIAGNOSIS — M75.32 CALCIFIC TENDONITIS OF LEFT SHOULDER: ICD-10-CM

## 2022-05-04 DIAGNOSIS — M54.12 RADICULOPATHY, CERVICAL REGION: ICD-10-CM

## 2022-05-04 DIAGNOSIS — G89.29 CHRONIC LEFT SHOULDER PAIN: ICD-10-CM

## 2022-05-04 DIAGNOSIS — M25.512 CHRONIC LEFT SHOULDER PAIN: ICD-10-CM

## 2022-05-04 DIAGNOSIS — M50.30 DEGENERATIVE CERVICAL DISC: ICD-10-CM

## 2022-05-04 DIAGNOSIS — R20.2 NUMBNESS AND TINGLING IN LEFT HAND: ICD-10-CM

## 2022-05-04 DIAGNOSIS — R20.0 NUMBNESS AND TINGLING IN LEFT HAND: ICD-10-CM

## 2022-05-04 DIAGNOSIS — M19.012 OSTEOARTHRITIS OF LEFT AC (ACROMIOCLAVICULAR) JOINT: ICD-10-CM

## 2022-05-04 PROCEDURE — G1004 CDSM NDSC: HCPCS

## 2022-05-04 PROCEDURE — 73221 MRI JOINT UPR EXTREM W/O DYE: CPT

## 2022-05-04 PROCEDURE — 72141 MRI NECK SPINE W/O DYE: CPT

## 2022-05-05 ENCOUNTER — OFFICE VISIT (OUTPATIENT)
Dept: PHYSICAL THERAPY | Facility: CLINIC | Age: 52
End: 2022-05-05
Payer: COMMERCIAL

## 2022-05-05 DIAGNOSIS — G89.29 CHRONIC LEFT SHOULDER PAIN: Primary | ICD-10-CM

## 2022-05-05 DIAGNOSIS — M25.512 CHRONIC LEFT SHOULDER PAIN: Primary | ICD-10-CM

## 2022-05-05 DIAGNOSIS — M75.32 CALCIFIC TENDONITIS OF LEFT SHOULDER: ICD-10-CM

## 2022-05-05 PROCEDURE — 97140 MANUAL THERAPY 1/> REGIONS: CPT

## 2022-05-05 PROCEDURE — 97112 NEUROMUSCULAR REEDUCATION: CPT

## 2022-05-05 NOTE — PROGRESS NOTES
Daily Note     Today's date: 2022  Patient name: Marisa Albert  : 1970  MRN: [id number]  Referring provider: Deric Telles MD  Dx:   Encounter Diagnosis     ICD-10-CM    1  Chronic left shoulder pain  M25 512     G89 29    2  Calcific tendonitis of left shoulder  M75 32                   Subjective:  Pt had cerv and left shoulder MRI yesterday  Pt has follow up appt with MD   Cont'd c/o pain/tightness left upper quadrant and intermittent paresthesias left 3rd to 5th digits  Pt states she was away on vacation and intensity of pain was decreased  Pt c/o difficulty yet reaching behind her back  Objective: See treatment diary below      Assessment: Tolerated treatment well  Pain/limitation noted with left shoulder functional IR behind her back  Good response to manual therapy and exercise as noted with decrease pain/tightness and improved shoulder mobility noted  Patient exhibited good technique with therapeutic exercises and would benefit from continued PT      Plan: Continue per plan of care  Progress treatment as tolerated           Precautions: na      Manuals 4/15 4/26 5/5 3/7 3/14 3/21 3/31 4/4 4/7 4/11   FMT LUQ - thoracic outlet 25 GH GH JL GH JL JL GH JL JL   1 rib 15 GH GH JL GH JL   JL JL   T spine HVLA  STM GH     JL STM GH                  Neuro Re-Ed             LTR UE abducted  10"x10   LTR to R/ arm out to side 10"x  10 JL JL GH     ULT at wall  3' [facility]       5'   UT elongation in supine  10"x5 each with tc's [facility]  10"x5 L        Education foam roll thoracic spine  5' [facility]    5' 10' [facility]     Low trap, ER             Scalene stretch seated    JL 10"x5 L JL       Posture ed    10' [facility] JL       Ther Ex             Self massage with ball at wall  3'           Sleeper stretch   30"x2                                                                                        Ther Activity                                       Gait Training                                       Modalities

## 2022-05-09 ENCOUNTER — OFFICE VISIT (OUTPATIENT)
Dept: PHYSICAL THERAPY | Facility: CLINIC | Age: 52
End: 2022-05-09
Payer: COMMERCIAL

## 2022-05-09 DIAGNOSIS — G89.29 CHRONIC LEFT SHOULDER PAIN: Primary | ICD-10-CM

## 2022-05-09 DIAGNOSIS — M75.32 CALCIFIC TENDONITIS OF LEFT SHOULDER: ICD-10-CM

## 2022-05-09 DIAGNOSIS — M25.512 CHRONIC LEFT SHOULDER PAIN: Primary | ICD-10-CM

## 2022-05-09 PROCEDURE — 97112 NEUROMUSCULAR REEDUCATION: CPT | Performed by: PHYSICAL THERAPIST

## 2022-05-09 PROCEDURE — 97140 MANUAL THERAPY 1/> REGIONS: CPT | Performed by: PHYSICAL THERAPIST

## 2022-05-09 NOTE — PROGRESS NOTES
Daily Note     Today's date: 2022  Patient name: Abel Srivastava  : 1970  MRN: [id number]  Referring provider: Shari Penn MD  Dx:   Encounter Diagnosis     ICD-10-CM    1  Chronic left shoulder pain  M25 512     G89 29    2  Calcific tendonitis of left shoulder  M75 32                   Subjective: Feeling better after chiro today  MRI results for neck and shoulder now available  Objective: See treatment diary below      Assessment: Tolerated treatment well and focused on pec minor, subscap and MRE shoulder ER/IR with distraction today    Patient demonstrated fatigue post treatment and would benefit from continued PT      Plan: Continue per plan of care  Progress treatment as tolerated         Precautions: na      Manuals 4/15 4/26 5/5 5/9     4/7 4/11   FMT LUQ - thoracic outlet 25 GH GH JL     JL JL   1 rib 15 GH GH JL     JL JL   T spine HVLA  STM GH                        Neuro Re-Ed             LTR UE abducted  10"x10           ULT at wall  3' [facility]       5'   UT elongation in supine  10"x5 each with tc's [facility]          Education foam roll thoracic spine  5' [facility]          Low trap, ER             Scalene stretch seated             Posture ed             Ther Ex             Self massage with ball at wall  3'           Sleeper stretch   30"x2          IR stretch with cane up back    5'                                                                          Ther Activity                                       Gait Training                                       Modalities

## 2022-05-12 ENCOUNTER — OFFICE VISIT (OUTPATIENT)
Dept: PHYSICAL THERAPY | Facility: CLINIC | Age: 52
End: 2022-05-12
Payer: COMMERCIAL

## 2022-05-12 DIAGNOSIS — G89.29 CHRONIC LEFT SHOULDER PAIN: Primary | ICD-10-CM

## 2022-05-12 DIAGNOSIS — M75.32 CALCIFIC TENDONITIS OF LEFT SHOULDER: ICD-10-CM

## 2022-05-12 DIAGNOSIS — M25.512 CHRONIC LEFT SHOULDER PAIN: Primary | ICD-10-CM

## 2022-05-12 PROCEDURE — 97140 MANUAL THERAPY 1/> REGIONS: CPT | Performed by: PHYSICAL THERAPIST

## 2022-05-12 PROCEDURE — 97112 NEUROMUSCULAR REEDUCATION: CPT | Performed by: PHYSICAL THERAPIST

## 2022-05-13 NOTE — PROGRESS NOTES
Daily Note     Today's date: 2022  Patient name: Payam Ahuja  : 1970  MRN: [id number]  Referring provider: Vitaliy Ramirez MD  Dx:   Encounter Diagnosis     ICD-10-CM    1  Chronic left shoulder pain  M25 512     G89 29    2  Calcific tendonitis of left shoulder  M75 32                   Subjective: Symptoms fully resolved for a short time after last visit  Hasn't had time to work on new home program yet  Objective: See treatment diary below      Assessment: Tolerated treatment well and shoulder mobility again improved following FMT with LAD and internal rotation MRE  Patsi Lacey Patient demonstrated fatigue post treatment and would benefit from continued PT      Plan: Continue per plan of care  Progress treatment as tolerated         Precautions: na      Manuals 4/15 4/26 5/5 5/9 5/12    4/7 4/11   FMT LUQ - thoracic outlet 25 GH GH JL JL    JL JL   1 rib 15 GH GH JL JL    JL JL   T spine HVLA  STM GH                        Neuro Re-Ed             LTR UE abducted  10"x10           ULT at wall  3' [facility]       5'   UT elongation in supine  10"x5 each with tc's [facility]          Education foam roll thoracic spine  5' [facility]          Low trap, ER             Scalene stretch seated             Posture ed             Ther Ex             Self massage with ball at wall  3'           Sleeper stretch   30"x2          IR stretch with cane up back    5' JL                                                                         Ther Activity                                       Gait Training                                       Modalities

## 2022-05-16 ENCOUNTER — APPOINTMENT (OUTPATIENT)
Dept: PHYSICAL THERAPY | Facility: CLINIC | Age: 52
End: 2022-05-16
Payer: COMMERCIAL

## 2022-05-17 ENCOUNTER — OFFICE VISIT (OUTPATIENT)
Dept: PHYSICAL THERAPY | Facility: CLINIC | Age: 52
End: 2022-05-17
Payer: COMMERCIAL

## 2022-05-17 DIAGNOSIS — G89.29 CHRONIC LEFT SHOULDER PAIN: Primary | ICD-10-CM

## 2022-05-17 DIAGNOSIS — M75.32 CALCIFIC TENDONITIS OF LEFT SHOULDER: ICD-10-CM

## 2022-05-17 DIAGNOSIS — M25.512 CHRONIC LEFT SHOULDER PAIN: Primary | ICD-10-CM

## 2022-05-17 PROCEDURE — 97112 NEUROMUSCULAR REEDUCATION: CPT

## 2022-05-17 PROCEDURE — 97140 MANUAL THERAPY 1/> REGIONS: CPT

## 2022-05-17 NOTE — PROGRESS NOTES
Daily Note     Today's date: 2022  Patient name: Lucille Ortiz  : 1970  MRN: [id number]  Referring provider: Javan Michele MD  Dx:   Encounter Diagnosis     ICD-10-CM    1  Chronic left shoulder pain  M25 512     G89 29    2  Calcific tendonitis of left shoulder  M75 32                   Subjective:  Pt reports improved left shoulder mobility after therapy  Pt c/o increase left shoulder pain/tightness the past few days  Objective: See treatment diary below      Assessment: Tolerated treatment well  Cont'd soft tissue restriction pec minor and subscap regions; tightness with shoulder extension and IR  Pt responds well to manual therapy and exercise with improved mobility post treatment  Patient demonstrated fatigue post treatment and would benefit from continued PT      Plan: Continue per plan of care  Progress treatment as tolerated         Precautions: na      Manuals 4/15 4/26 5/5 5/9 5/12 5/17   4/7 4/11   FMT LUQ - thoracic outlet 25 GH GH JL JL GH   JL JL   1 rib 15 GH GH JL JL GH   JL JL   T spine HVLA  STM GH                        Neuro Re-Ed             LTR UE abducted  10"x10           ULT at wall  3' [facility]       5'   UT elongation in supine  10"x5 each with 's [facility]          Education foam roll thoracic spine  5' [facility]   5' [facility]        Low trap, ER             Scalene stretch seated             Posture ed             Ther Ex             Self massage with ball at wall  3'           Sleeper stretch   30"x2          IR stretch with cane up back    5' [facility]                                                                        Ther Activity                                       Gait Training                                       Modalities

## 2022-05-19 ENCOUNTER — OFFICE VISIT (OUTPATIENT)
Dept: PHYSICAL THERAPY | Facility: CLINIC | Age: 52
End: 2022-05-19
Payer: COMMERCIAL

## 2022-05-19 DIAGNOSIS — M25.512 CHRONIC LEFT SHOULDER PAIN: Primary | ICD-10-CM

## 2022-05-19 DIAGNOSIS — M75.32 CALCIFIC TENDONITIS OF LEFT SHOULDER: ICD-10-CM

## 2022-05-19 DIAGNOSIS — G89.29 CHRONIC LEFT SHOULDER PAIN: Primary | ICD-10-CM

## 2022-05-19 PROCEDURE — 97112 NEUROMUSCULAR REEDUCATION: CPT | Performed by: PHYSICAL THERAPIST

## 2022-05-19 PROCEDURE — 97140 MANUAL THERAPY 1/> REGIONS: CPT | Performed by: PHYSICAL THERAPIST

## 2022-05-19 NOTE — PROGRESS NOTES
Daily Note     Today's date: 2022  Patient name: Josue Aleman  : 1970  MRN: [id number]  Referring provider: Pilar Saunders MD  Dx:   Encounter Diagnosis     ICD-10-CM    1  Chronic left shoulder pain  M25 512     G89 29    2  Calcific tendonitis of left shoulder  M75 32                   Subjective: Felt much better after last visit  Objective: See treatment diary below      Assessment: Tolerated treatment well  Patient demonstrated fatigue post treatment and would benefit from continued PT      Plan: Continue per plan of care  Progress treatment as tolerated         Precautions: na      Manuals 4/15 4/26 5/5 5/9 5/12 5/17 5/19  4/7 4/11   FMT LUQ - thoracic outlet 25 GH GH JL JL GH JL  JL JL   1 rib 15 GH GH JL JL GH JL  JL JL   T spine HVLA  STM GH                        Neuro Re-Ed             LTR UE abducted  10"x10           ULT at wall  3' [facility]       5'   UT elongation in supine  10"x5 each with 's [facility]          Education foam roll thoracic spine  5' [facility]   5' [facility]  JL      Low trap, ER             Scalene stretch seated             Posture ed             Ther Ex             Self massage with ball at wall  3'           Sleeper stretch   30"x2          IR stretch with cane up back    5' JL [facility] JL      Seated IR stretch at table       10x                                                          Ther Activity                                       Gait Training                                       Modalities

## 2022-05-23 ENCOUNTER — OFFICE VISIT (OUTPATIENT)
Dept: PHYSICAL THERAPY | Facility: CLINIC | Age: 52
End: 2022-05-23
Payer: COMMERCIAL

## 2022-05-23 DIAGNOSIS — M75.32 CALCIFIC TENDONITIS OF LEFT SHOULDER: ICD-10-CM

## 2022-05-23 DIAGNOSIS — M25.512 CHRONIC LEFT SHOULDER PAIN: Primary | ICD-10-CM

## 2022-05-23 DIAGNOSIS — G89.29 CHRONIC LEFT SHOULDER PAIN: Primary | ICD-10-CM

## 2022-05-23 PROCEDURE — 97140 MANUAL THERAPY 1/> REGIONS: CPT | Performed by: PHYSICAL THERAPIST

## 2022-05-23 PROCEDURE — 97112 NEUROMUSCULAR REEDUCATION: CPT | Performed by: PHYSICAL THERAPIST

## 2022-05-23 NOTE — PROGRESS NOTES
Daily Note     Today's date: 2022  Patient name: Halle Roberts  : 1970  MRN: [id number]  Referring provider: Shelbi Lama MD  Dx:   Encounter Diagnosis     ICD-10-CM    1  Chronic left shoulder pain  M25 512     G89 29    2  Calcific tendonitis of left shoulder  M75 32                   Subjective: Felt better for 1-2 days after last treatment  Symptoms returned after doing a lot of yard work that she isn't used to performing  Objective: See treatment diary below      Assessment: Tolerated treatment well and symptoms abolished with aggresive MT today    Patient demonstrated fatigue post treatment and would benefit from continued PT      Plan: Continue per plan of care  Progress treatment as tolerated         Precautions: na      Manuals 4/15 4/26 5/5 5/9 5/12 5/17 5/19 5/23     FMT LUQ - thoracic outlet 25 GH GH JL JL GH JL JL     1 rib 15 GH GH JL JL GH JL JL     T spine HVLA  STM GH                        Neuro Re-Ed             LTR UE abducted  10"x10           ULT at wall  3'  [facility]          UT elongation in supine  10"x5 each with tc's  [facility]          Education foam roll thoracic spine  5'  [facility]   5'  [facility]  JL JL     Low trap, ER             Scalene stretch seated             Posture ed             Ther Ex             Self massage with ball at wall  3'           Sleeper stretch   30"x2          IR stretch with cane up back    5' JL GH JL JL     Seated IR stretch at table       10x JL                                                         Ther Activity                                       Gait Training                                       Modalities

## 2022-05-26 ENCOUNTER — OFFICE VISIT (OUTPATIENT)
Dept: PHYSICAL THERAPY | Facility: CLINIC | Age: 52
End: 2022-05-26
Payer: COMMERCIAL

## 2022-05-26 DIAGNOSIS — M75.32 CALCIFIC TENDONITIS OF LEFT SHOULDER: ICD-10-CM

## 2022-05-26 DIAGNOSIS — G89.29 CHRONIC LEFT SHOULDER PAIN: Primary | ICD-10-CM

## 2022-05-26 DIAGNOSIS — M25.512 CHRONIC LEFT SHOULDER PAIN: Primary | ICD-10-CM

## 2022-05-26 PROCEDURE — 97110 THERAPEUTIC EXERCISES: CPT | Performed by: PHYSICAL THERAPIST

## 2022-05-26 PROCEDURE — 97112 NEUROMUSCULAR REEDUCATION: CPT | Performed by: PHYSICAL THERAPIST

## 2022-05-26 NOTE — PROGRESS NOTES
Daily Note     Today's date: 2022  Patient name: Zeenat Mary  : 1970  MRN: [id number]  Referring provider: Grace Madera MD  Dx:   Encounter Diagnosis     ICD-10-CM    1  Chronic left shoulder pain  M25 512     G89 29    2  Calcific tendonitis of left shoulder  M75 32                   Subjective: Reports shoulder was "more aggravated" after her most recent PT treatment  Unsure if this was related to PT or to yard work she was doing  She needed tylenol for two days after her last visit, but feels pretty good today  Objective: See treatment diary below      Assessment: Tolerated treatment fair and focused on stretching and mobility work today, held MT to avoid aggravating her symptoms  She has not yet impletemented the updated home program developed over the last two weeks    Patient exhibited good technique with therapeutic exercises  No pain reported at end of session, minor tingling in her pinky finger that was present at onset of session today  Plan: Continue per plan of care        Precautions: na      Manuals 4/15 4/26 5/5 5/9 5/12 5/17 5/19 5/23 5/26    FMT LUQ - thoracic outlet 25 GH GH JL JL GH JL JL NV    1 rib 15 GH GH JL JL GH JL JL NV    T spine HVLA  STM GH                        Neuro Re-Ed             LTR UE abducted  10"x10           ULT at wall  3' [address] [facility]          UT elongation in supine  10"x5 each with tc's [address] [facility]          Education foam roll thoracic spine  5' [address] [facility]   5' [address] "hugs"x20, LTR 10"x10    Low trap, ER             Scalene stretch seated             Posture ed             Ther Ex             Self massage with ball at wall  3'           Sleeper stretch   30"x2          IR stretch with cane up back    5' JL [facility] [facility] JL JL JL    Seated IR stretch at table       10x JL 5'    SL 90-90 pivot prone         10x                                           Ther Activity                                       Gait Training                                       Modalities

## 2022-05-27 ENCOUNTER — OFFICE VISIT (OUTPATIENT)
Dept: OBGYN CLINIC | Facility: MEDICAL CENTER | Age: 52
End: 2022-05-27
Payer: COMMERCIAL

## 2022-05-27 VITALS
HEIGHT: 62 IN | DIASTOLIC BLOOD PRESSURE: 72 MMHG | HEART RATE: 84 BPM | WEIGHT: 218.4 LBS | SYSTOLIC BLOOD PRESSURE: 108 MMHG | BODY MASS INDEX: 40.19 KG/M2

## 2022-05-27 DIAGNOSIS — M75.32 CALCIFIC TENDONITIS OF LEFT SHOULDER: ICD-10-CM

## 2022-05-27 DIAGNOSIS — R20.2 NUMBNESS AND TINGLING IN LEFT HAND: ICD-10-CM

## 2022-05-27 DIAGNOSIS — M19.012 OSTEOARTHRITIS OF LEFT AC (ACROMIOCLAVICULAR) JOINT: ICD-10-CM

## 2022-05-27 DIAGNOSIS — M50.30 DEGENERATIVE CERVICAL DISC: ICD-10-CM

## 2022-05-27 DIAGNOSIS — M54.12 RADICULOPATHY, CERVICAL REGION: Primary | ICD-10-CM

## 2022-05-27 DIAGNOSIS — M25.512 CHRONIC LEFT SHOULDER PAIN: ICD-10-CM

## 2022-05-27 DIAGNOSIS — R20.0 NUMBNESS AND TINGLING IN LEFT HAND: ICD-10-CM

## 2022-05-27 DIAGNOSIS — G89.29 CHRONIC LEFT SHOULDER PAIN: ICD-10-CM

## 2022-05-27 PROCEDURE — 99213 OFFICE O/P EST LOW 20 MIN: CPT | Performed by: EMERGENCY MEDICINE

## 2022-05-27 PROCEDURE — 20610 DRAIN/INJ JOINT/BURSA W/O US: CPT | Performed by: EMERGENCY MEDICINE

## 2022-05-27 RX ORDER — TRIAMCINOLONE ACETONIDE 40 MG/ML
40 INJECTION, SUSPENSION INTRA-ARTICULAR; INTRAMUSCULAR
Status: COMPLETED | OUTPATIENT
Start: 2022-05-27 | End: 2022-05-27

## 2022-05-27 RX ORDER — BUPIVACAINE HYDROCHLORIDE 5 MG/ML
3.5 INJECTION, SOLUTION PERINEURAL
Status: COMPLETED | OUTPATIENT
Start: 2022-05-27 | End: 2022-05-27

## 2022-05-27 RX ADMIN — TRIAMCINOLONE ACETONIDE 40 MG: 40 INJECTION, SUSPENSION INTRA-ARTICULAR; INTRAMUSCULAR at 11:11

## 2022-05-27 RX ADMIN — BUPIVACAINE HYDROCHLORIDE 3.5 ML: 5 INJECTION, SOLUTION PERINEURAL at 11:11
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
## 2022-05-31 ENCOUNTER — OFFICE VISIT (OUTPATIENT)
Dept: PHYSICAL THERAPY | Facility: CLINIC | Age: 52
End: 2022-05-31
Payer: COMMERCIAL

## 2022-05-31 DIAGNOSIS — G89.29 CHRONIC LEFT SHOULDER PAIN: Primary | ICD-10-CM

## 2022-05-31 DIAGNOSIS — M25.512 CHRONIC LEFT SHOULDER PAIN: Primary | ICD-10-CM

## 2022-05-31 DIAGNOSIS — M75.32 CALCIFIC TENDONITIS OF LEFT SHOULDER: ICD-10-CM

## 2022-05-31 PROCEDURE — 97140 MANUAL THERAPY 1/> REGIONS: CPT

## 2022-05-31 PROCEDURE — 97112 NEUROMUSCULAR REEDUCATION: CPT

## 2022-05-31 PROCEDURE — 97110 THERAPEUTIC EXERCISES: CPT

## 2022-05-31 NOTE — PROGRESS NOTES
Daily Note     Today's date: 2022  Patient name: Halle Roberts  : 1970  MRN: [id number]  Referring provider: Shelbi Lama MD  Dx:   Encounter Diagnosis     ICD-10-CM    1  Chronic left shoulder pain  M25 512     G89 29    2  Calcific tendonitis of left shoulder  M75 32                   Subjective:  Pt reports seeing MD  and received injection in left shoulder with some relief  Objective: See treatment diary below      Assessment: Tolerated treatment well  Good tolerance to manual therapy and exercise with improved left shoulder mobility noted  Plan: Continue per plan of care        Precautions: na      Manuals 4/15 4/26 5/5 5/9 5/12 5/17 5/19 5/23 5/26 5/31   FMT LUQ - thoracic outlet 25 GH GH JL JL GH JL JL NV GH   1 rib 15 GH GH JL JL GH JL JL NV GH   T spine HVLA  STM GH                        Neuro Re-Ed             LTR UE abducted  10"x10           ULT at wall  3'  [facility]          UT elongation in supine  10"x5 each with tc's  [facility]          Education foam roll thoracic spine  5'  [facility]   5'  [facility]  JL JL "hugs"x20, LTR 10"x10 GH   Low trap, ER             Scalene stretch seated             Posture ed             Ther Ex             Self massage with ball at wall  3'           Sleeper stretch   30"x2          IR stretch with cane up back    5' JL  [facility] JL JL JL GH   Seated IR stretch at table       10x JL 5'  [address]   SL 90-90 pivot prone         10x [address] [facility]                                          Ther Activity                                       Gait Training                                       Modalities

## 2022-06-02 ENCOUNTER — APPOINTMENT (OUTPATIENT)
Dept: PHYSICAL THERAPY | Facility: CLINIC | Age: 52
End: 2022-06-02
Payer: COMMERCIAL

## 2022-06-06 ENCOUNTER — OFFICE VISIT (OUTPATIENT)
Dept: PHYSICAL THERAPY | Facility: CLINIC | Age: 52
End: 2022-06-06
Payer: COMMERCIAL

## 2022-06-06 DIAGNOSIS — M75.32 CALCIFIC TENDONITIS OF LEFT SHOULDER: ICD-10-CM

## 2022-06-06 DIAGNOSIS — G89.29 CHRONIC LEFT SHOULDER PAIN: Primary | ICD-10-CM

## 2022-06-06 DIAGNOSIS — M25.512 CHRONIC LEFT SHOULDER PAIN: Primary | ICD-10-CM

## 2022-06-06 PROCEDURE — 97112 NEUROMUSCULAR REEDUCATION: CPT

## 2022-06-06 PROCEDURE — 97110 THERAPEUTIC EXERCISES: CPT

## 2022-06-06 PROCEDURE — 97140 MANUAL THERAPY 1/> REGIONS: CPT

## 2022-06-06 NOTE — PROGRESS NOTES
Daily Note     Today's date: 2022  Patient name: Sean Snyder  : 1970  MRN: [id number]  Referring provider: Jeromy St MD  Dx:   Encounter Diagnosis     ICD-10-CM    1  Chronic left shoulder pain  M25 512     G89 29    2  Calcific tendonitis of left shoulder  M75 32                   Subjective:  Pt cont's to report improvement with functional mobility left shoulder and decrease pain  Pt reports increase frequency of left hand paresthesias since recent chiropractor appt  Pt compliant with HEP  Objective: See treatment diary below  Pt seen x21 visits with FOTO outcome measure 53 at IE and 83 this visit  Assessment: Tolerated treatment well  Improved functional IR ROM noted left shoulder  Pt cont's to respond well to manual therapy and exercise with improved left shoulder mobility and decrease pain  Decrease left hand paresthesias reported post treatment  Plan: Continue per plan of care  Discussed with therapist (Alec Sutherland) and will continue 1x/week         Precautions: na      Manuals    FMT LUQ - thoracic outlet GH GH GH JL JL GH JL JL NV GH   1 rib [address] GH GH JL JL GH JL JL NV [address]   T spine HVLA  STM GH                        Neuro Re-Ed             LTR UE abducted  10"x10           ULT at wall  3' [address]          UT elongation in supine  10"x5 each with tc's [address]           foam roll thoracic spine 5' [address] 5' [address]   5' [address]  JL JL "hugs"x20, LTR 10"x10 GH   Low trap, ER             Scalene stretch seated             Posture ed             Ther Ex             Self massage with ball at wall  3'           Sleeper stretch   30"x2          IR stretch with cane up back 5'   5' JL GH JL JL JL GH   Seated IR stretch at table 5'      10x JL 5' [address]   SL 90-90 pivot prone nv        10x [address]                                          Ther Activity                                       Gait Training                                       Modalities

## 2022-06-09 ENCOUNTER — APPOINTMENT (OUTPATIENT)
Dept: PHYSICAL THERAPY | Facility: CLINIC | Age: 52
End: 2022-06-09
Payer: COMMERCIAL

## 2022-06-13 ENCOUNTER — OFFICE VISIT (OUTPATIENT)
Dept: PHYSICAL THERAPY | Facility: CLINIC | Age: 52
End: 2022-06-13
Payer: COMMERCIAL

## 2022-06-13 DIAGNOSIS — G89.29 CHRONIC LEFT SHOULDER PAIN: Primary | ICD-10-CM

## 2022-06-13 DIAGNOSIS — M25.512 CHRONIC LEFT SHOULDER PAIN: Primary | ICD-10-CM

## 2022-06-13 DIAGNOSIS — M75.32 CALCIFIC TENDONITIS OF LEFT SHOULDER: ICD-10-CM

## 2022-06-13 PROCEDURE — 97112 NEUROMUSCULAR REEDUCATION: CPT

## 2022-06-13 PROCEDURE — 97140 MANUAL THERAPY 1/> REGIONS: CPT

## 2022-06-13 PROCEDURE — 97110 THERAPEUTIC EXERCISES: CPT

## 2022-06-13 NOTE — PROGRESS NOTES
Daily Note     Today's date: 2022  Patient name: Ileana Roberson  : 1970  MRN: [id number]  Referring provider: Awa Hathaway MD  Dx:   Encounter Diagnosis     ICD-10-CM    1  Chronic left shoulder pain  M25 512     G89 29    2  Calcific tendonitis of left shoulder  M75 32                   Subjective:  Pt cont's to report improvement with functional mobility left shoulder and decrease pain  Pt cont's to report intermittent paresthesias left hand  Objective: See treatment diary below  Assessment: Tolerated treatment well  Improved functional IR ROM noted left shoulder  Pt cont's to respond well to manual therapy and exercise with improved left shoulder mobility and decrease pain  Plan: Continue per plan of care  Discussed with therapist (Benjamin Luna) and will continue 1x/week         Precautions: na      Manuals    FMT LUQ - thoracic outlet GH GH GH JL JL GH JL JL NV GH   1 rib [address] GH GH JL JL GH JL JL NV GH   T spine HVLA                          Neuro Re-Ed             LTR UE abducted             ULT at wall   GH          UT elongation in supine   GH           foam roll thoracic spine 5' [address]' [address]   5' [address]  JL JL "hugs"x20, LTR 10"x10 [address]   Supine scap set with iso shld ext   10"x5           Low trap, ER             Scalene stretch seated             Posture ed             Ther Ex             Self massage with ball at wall             Sleeper stretch   30"x2          IR stretch with cane up back 5'  [address]  5' JL GH JL JL JL GH   Seated IR stretch at table 5'  [address]     10x JL [address]   SL 90-90 pivot prone nv        10x [address]                                          Ther Activity                                       Gait Training                                       Modalities

## 2022-06-20 ENCOUNTER — OFFICE VISIT (OUTPATIENT)
Dept: PHYSICAL THERAPY | Facility: CLINIC | Age: 52
End: 2022-06-20
Payer: COMMERCIAL

## 2022-06-20 DIAGNOSIS — M75.32 CALCIFIC TENDONITIS OF LEFT SHOULDER: ICD-10-CM

## 2022-06-20 DIAGNOSIS — M25.512 CHRONIC LEFT SHOULDER PAIN: Primary | ICD-10-CM

## 2022-06-20 DIAGNOSIS — G89.29 CHRONIC LEFT SHOULDER PAIN: Primary | ICD-10-CM

## 2022-06-20 PROCEDURE — 97140 MANUAL THERAPY 1/> REGIONS: CPT

## 2022-06-20 PROCEDURE — 97110 THERAPEUTIC EXERCISES: CPT

## 2022-06-20 PROCEDURE — 97112 NEUROMUSCULAR REEDUCATION: CPT

## 2022-06-20 NOTE — PROGRESS NOTES
Daily Note     Today's date: 2022  Patient name: Ileana Roberson  : 1970  MRN: [id number]  Referring provider: Awa Hahtaway MD  Dx:   Encounter Diagnosis     ICD-10-CM    1  Chronic left shoulder pain  M25 512     G89 29    2  Calcific tendonitis of left shoulder  M75 32                   Subjective:  Pt cont's to report improvement with functional mobility left shoulder, decrease pain and left UE paresthesias, improved tolerance to activity  Objective: See treatment diary below  Assessment: Tolerated treatment well  Continue to note improved functional IR ROM left shoulder with decrease pain  Good tolerance to manual therapy and exercise with no c/o pain  Pt has good understanding of exercise program and demonstrates proper technique  Plan: Continue per plan of care  Continue 1x/week with possible D/C to HEP next visit         Precautions: na      Manuals    FMT LUQ - thoracic outlet GH GH GH JL JL GH JL JL NV GH   1 rib [address] GH GH JL JL GH JL JL NV GH   T spine HVLA                          Neuro Re-Ed             LTR UE abducted             ULT at wall             UT elongation in supine              foam roll thoracic spine 5' [address] 5' [address]   5' [address]  JL JL "hugs"x20, LTR 10"x10 [address]   Supine scap set with iso shld ext   10"x5 [address]          Low trap, ER             Scalene stretch seated             Posture ed             Ther Ex             Self massage with ball at wall             Sleeper stretch             IR stretch with cane up back 5' [address] 5' JL GH JL JL JL GH   Seated IR stretch at table 5' [address] GH    10x JL 5' [address]   SL 90-90 pivot prone nv        10x [address]                                          Ther Activity                                       Gait Training                                       Modalities

## 2022-06-27 ENCOUNTER — OFFICE VISIT (OUTPATIENT)
Dept: PHYSICAL THERAPY | Facility: CLINIC | Age: 52
End: 2022-06-27
Payer: COMMERCIAL

## 2022-06-27 DIAGNOSIS — M75.32 CALCIFIC TENDONITIS OF LEFT SHOULDER: ICD-10-CM

## 2022-06-27 DIAGNOSIS — G89.29 CHRONIC LEFT SHOULDER PAIN: Primary | ICD-10-CM

## 2022-06-27 DIAGNOSIS — M25.512 CHRONIC LEFT SHOULDER PAIN: Primary | ICD-10-CM

## 2022-06-27 PROCEDURE — 97112 NEUROMUSCULAR REEDUCATION: CPT

## 2022-06-27 PROCEDURE — 97140 MANUAL THERAPY 1/> REGIONS: CPT

## 2022-06-27 PROCEDURE — 97110 THERAPEUTIC EXERCISES: CPT

## 2022-06-27 NOTE — PROGRESS NOTES
Daily Note     Today's date: 2022  Patient name: Fartun Fleming  : 1970  MRN: [id number]  Referring provider: Nadine Murillo MD  Dx:   Encounter Diagnosis     ICD-10-CM    1  Chronic left shoulder pain  M25 512     G89 29    2  Calcific tendonitis of left shoulder  M75 32                   Subjective:  Pt states 90% improved since starting therapy  Pt reports intermittent paresthesias left thenar eminence and thumb  Objective: See treatment diary below  Assessment: Tolerated treatment well  Continue to note improved functional IR ROM left shoulder with decrease pain  Pt has good understanding of exercise program and demonstrates proper technique  Pt independent with HEP  Plan: Hold on therapy at this time and pt will continue with HEP          Precautions: na      Manuals    FMT LUQ - thoracic outlet GH GH GH GH JL GH JL JL NV GH   1 rib [address] GH [address] GH JL GH JL JL NV GH   T spine HVLA                          Neuro Re-Ed             LTR UE abducted             ULT at wall             UT elongation in supine              foam roll thoracic spine 5' [address] 5' [address]  5' [address]  JL JL "hugs"x20, LTR 10"x10 [address]   Supine scap set with iso shld ext   10"x5 [address] GH         Low trap, ER             Scalene stretch seated             Posture ed             Ther Ex             Self massage with ball at wall             Sleeper stretch             IR stretch with cane up back 5' [address] GH [address] JL [address] JL JL JL [address]   Seated IR stretch at table 5' [address]   10x JL 5' [address]   SL 90-90 pivot prone nv        10x [address]                                          Ther Activity                                       Gait Training                                       Modalities

## 2022-07-28 ENCOUNTER — APPOINTMENT (OUTPATIENT)
Dept: LAB | Facility: MEDICAL CENTER | Age: 52
End: 2022-07-28
Payer: COMMERCIAL

## 2022-07-28 DIAGNOSIS — Z00.8 HEALTH EXAMINATION IN POPULATION SURVEY: ICD-10-CM

## 2022-07-28 LAB
CHOLEST SERPL-MCNC: 157 MG/DL
EST. AVERAGE GLUCOSE BLD GHB EST-MCNC: 97 MG/DL
HBA1C MFR BLD: 5 %
HDLC SERPL-MCNC: 54 MG/DL
LDLC SERPL CALC-MCNC: 86 MG/DL (ref 0–100)
NONHDLC SERPL-MCNC: 103 MG/DL
TRIGL SERPL-MCNC: 87 MG/DL

## 2022-07-28 PROCEDURE — 80061 LIPID PANEL: CPT

## 2022-07-28 PROCEDURE — 83036 HEMOGLOBIN GLYCOSYLATED A1C: CPT

## 2022-07-28 PROCEDURE — 36415 COLL VENOUS BLD VENIPUNCTURE: CPT

## 2022-08-17 ENCOUNTER — HOSPITAL ENCOUNTER (OUTPATIENT)
Dept: NEUROLOGY | Facility: CLINIC | Age: 52
Discharge: HOME | End: 2022-08-17
Payer: COMMERCIAL

## 2022-08-17 DIAGNOSIS — R20.2 NUMBNESS AND TINGLING IN LEFT HAND: ICD-10-CM

## 2022-08-17 DIAGNOSIS — M50.30 DEGENERATIVE CERVICAL DISC: ICD-10-CM

## 2022-08-17 DIAGNOSIS — R20.0 NUMBNESS AND TINGLING IN LEFT HAND: ICD-10-CM

## 2022-08-17 DIAGNOSIS — M54.12 RADICULOPATHY, CERVICAL REGION: ICD-10-CM

## 2022-08-17 PROCEDURE — 95886 MUSC TEST DONE W/N TEST COMP: CPT | Performed by: PSYCHIATRY & NEUROLOGY

## 2022-08-17 PROCEDURE — 95909 NRV CNDJ TST 5-6 STUDIES: CPT | Performed by: PSYCHIATRY & NEUROLOGY

## 2022-09-14 ENCOUNTER — APPOINTMENT (OUTPATIENT)
Dept: LAB | Facility: MEDICAL CENTER | Age: 52
End: 2022-09-14
Payer: COMMERCIAL

## 2022-09-14 DIAGNOSIS — R79.89 ABNORMAL TSH: ICD-10-CM

## 2022-09-14 DIAGNOSIS — R76.8 THYROID ANTIBODY POSITIVE: ICD-10-CM

## 2022-09-14 LAB
T3 SERPL-MCNC: 1 NG/ML (ref 0.6–1.8)
T4 FREE SERPL-MCNC: 0.9 NG/DL (ref 0.76–1.46)
TSH SERPL DL<=0.05 MIU/L-ACNC: 2.19 UIU/ML (ref 0.45–4.5)

## 2022-09-14 PROCEDURE — 36415 COLL VENOUS BLD VENIPUNCTURE: CPT

## 2022-09-14 PROCEDURE — 84480 ASSAY TRIIODOTHYRONINE (T3): CPT

## 2022-09-14 PROCEDURE — 84439 ASSAY OF FREE THYROXINE: CPT

## 2022-09-14 PROCEDURE — 84443 ASSAY THYROID STIM HORMONE: CPT

## 2022-09-25 NOTE — PROGRESS NOTES
FAMILY PRACTICE OFFICE VISIT  [facility] - [facility]       NAME: Mahogany Cramer  AGE: 46 y o  SEX: female       : 1970        MRN: [id number]    DATE: 2022  TIME: 10:21 AM    Assessment and Plan     Problem List Items Addressed This Visit        Respiratory    Obstructive sleep apnea treated with continuous positive airway pressure (CPAP)     Patient states that she is unable to use a CPAP  She was encouraged to discuss getting an oral mandibular advancement device with her dentist             Cardiovascular and Mediastinum    Spider vein, symptomatic     Patient notes that she will be following up with vascular surgery again later this year to get these addressed  Musculoskeletal and Integument    Candidal intertrigo     Patient given nystatin powder to apply under breasts as needed  Relevant Medications    nystatin (MYCOSTATIN) powder       Other    Fatigue     Encouraged patient to pursue oral mandibular advancement device through her dentist to address her sleep apnea  Reviewed recent normal thyroid function  Check CBC and vitamin-D level  Relevant Orders    CBC and differential    Obesity with body mass index 30 or greater - Primary     BMI Counseling: Body mass index is 39 47 kg/m²  The BMI is above normal  Nutrition recommendations include reducing portion sizes, 3-5 servings of fruits/vegetables daily and increasing intake of lean protein  Exercise recommendations include moderate aerobic physical activity for 150 minutes/week and exercising 3-5 times per week  Pharmacotherapy was ordered for patient to aid in weight loss  Script written for [address] [place] [address] for patient to start  She previously has tried phentermine but felt that it made her heart race  Follow-up in 1 month  We also had extensive discussion about trying to exercise regularly and work on improving her diet    We discussed the importance of staying consistent and establishing new lifestyle patterns as opposed to doing a diet that is short-term and expecting long-term lasting results  Patient declined referral to Nutrition or weight management at this time  Relevant Medications    liraglutide (SAXENDA) injection    Vitamin D deficiency     Patient takes vitamin-D 5000 units but not consistently  Check level and will adjust dosing as needed  Relevant Orders    Vitamin D 25 hydroxy    Thyroid antibody positive     Reviewed that her recent TSH, T3, and T4 were normal   We will need to continue monitoring her levels given her positive antibodies  We discussed the increased likelihood that at some point she will develop true hypothyroidism  Other Visit Diagnoses     Encounter for screening mammogram for breast cancer        Relevant Orders    Mammo screening bilateral w 3d & cad    Poison ivy dermatitis        Relevant Medications    nystatin (MYCOSTATIN) powder          Candidal intertrigo  Patient given nystatin powder to apply under breasts as needed  Fatigue  Encouraged patient to pursue oral mandibular advancement device through her dentist to address her sleep apnea  Reviewed recent normal thyroid function  Check CBC and vitamin-D level  Obesity with body mass index 30 or greater  BMI Counseling: Body mass index is 39 47 kg/m²  The BMI is above normal  Nutrition recommendations include reducing portion sizes, 3-5 servings of fruits/vegetables daily and increasing intake of lean protein  Exercise recommendations include moderate aerobic physical activity for 150 minutes/week and exercising 3-5 times per week  Pharmacotherapy was ordered for patient to aid in weight loss  Script written for [address] [place] [address] for patient to start  She previously has tried phentermine but felt that it made her heart race  Follow-up in 1 month  We also had extensive discussion about trying to exercise regularly and work on improving her diet    We discussed the importance of staying consistent and establishing new lifestyle patterns as opposed to doing a diet that is short-term and expecting long-term lasting results  Patient declined referral to Nutrition or weight management at this time  Vitamin D deficiency  Patient takes vitamin-D 5000 units but not consistently  Check level and will adjust dosing as needed  Thyroid antibody positive  Reviewed that her recent TSH, T3, and T4 were normal   We will need to continue monitoring her levels given her positive antibodies  We discussed the increased likelihood that at some point she will develop true hypothyroidism  Spider vein, symptomatic  Patient notes that she will be following up with vascular surgery again later this year to get these addressed  Obstructive sleep apnea treated with continuous positive airway pressure (CPAP)  Patient states that she is unable to use a CPAP  She was encouraged to discuss getting an oral mandibular advancement device with her dentist             Chief Complaint     Chief Complaint   Patient presents with    Follow-up     Follow up for blood work       History of Present Illness   Leydi Arellano is a 46y o -year-old female who presents for follow-up on chronic conditions  The patient feels that sleep apnea has been untreated  Regular CPAP use is not tolerated - did not yet pursue oral mandibular advancement device  The patient confirms excessive daytime sleepiness  Last year, she noted symptomatic spider veins/venous insufficiency and was encouraged to continue compression stockings and was referred to Vascular  She notes she was going to have an operation to address these but ended up getting COVID around the same time  She plans to have them addressed this winter  The patient has a history of vitamin-D deficiency and is currently taking 5000 units 1-2 times weekly  Last vitamin-D level was 30 in 12/2021  Patient had a history of elevated TSH   Recent repeat labs this month were normal, though  TSH was 2 190 with a normal T3 and T4 as well  She does have a hx of positive thyroid antibodies, though, so continued monitoring is necessary  She notes that she has concerns for a long time about her weight and fatigue level  She notes that her hair feels like it is always falling out  Review of Systems   Review of Systems   Constitutional: Positive for fatigue  Negative for chills and fever  Respiratory: Negative for shortness of breath  Cardiovascular: Negative for chest pain, palpitations and leg swelling  Musculoskeletal:        Leg discomfort   Neurological: Negative for dizziness and headaches         Active Problem List     Patient Active Problem List   Diagnosis    Depression, major, recurrent ([facility] [address] )    Eczema    Enlarged uterus    Fatigue    Hand dermatitis    Irregular menses    Obesity with body mass index 30 or greater    Spider vein, symptomatic    Spider veins of both lower extremities    Vertigo    Weight gain    Arthralgia    Obstructive sleep apnea treated with continuous positive airway pressure (CPAP)    Vitamin D deficiency    Candidal intertrigo    Venous insufficiency of both lower extremities    Abnormal TSH    Thyroid antibody positive    Radiculopathy, cervical region    Numbness and tingling in left hand         Past Medical History:  Past Medical History:   Diagnosis Date    Depression     Sleep apnea        Past Surgical History:  Past Surgical History:   Procedure Laterality Date    MOUTH SURGERY      ROOT CANAL      Apicoectomy; resolved; 07/26/17    WISDOM TOOTH EXTRACTION      1980's       Family History:  Family History   Problem Relation Age of Onset    Hypertension Mother     Rheum arthritis Mother     Diverticulosis Mother         plus diverticulitis    Cirrhosis Father     Leukemia Father 61        ALL    Diabetes Maternal Grandmother         mellitus    Hypertension Maternal Grandmother         pulmonary    Cancer Maternal Grandfather 48        face/throat    Anxiety disorder Paternal Grandmother     Depression Paternal Grandmother     Dementia Paternal Grandmother     Glaucoma Paternal Grandmother     Hypertension Paternal Grandmother     Lung cancer Paternal Grandfather 80    Heart attack Paternal Grandfather     No Known Problems Sister     No Known Problems Daughter        Social History:  Social History     Socioeconomic History    Marital status: /Civil Union     Spouse name: Not on file    Number of children: Not on file    Years of education: college student    Highest education level: Not on file   Occupational History    Occupation: employed   Tobacco Use    Smoking status: Never Smoker    Smokeless tobacco: Never Used   Vaping Use    Vaping Use: Never used   Substance and Sexual Activity    Alcohol use: Yes     Comment: occassional    Drug use: Never    Sexual activity: Yes     Partners: Male     Birth control/protection: Male Sterilization, None   Other Topics Concern    Not on file   Social History Narrative    Daily coffee consumption 2 cups a day    Household: Mother     Social Determinants of Health     Financial Resource Strain: Not on file   Food Insecurity: Not on file   Transportation Needs: Not on file   Physical Activity: Not on file   Stress: Not on file   Social Connections: Not on file   Intimate Partner Violence: Not on file   Housing Stability: Not on file       Objective     Vitals:    09/26/22 1600   BP: 110/70   BP Location: Left arm   Cuff Size: Large   Pulse: 87   Temp: 98 7 °F (37 1 °C)   TempSrc: Tympanic   SpO2: 97%   Weight: 97 9 kg (215 lb 12 8 oz)   Height: 5' 2" (1 575 m)     Wt Readings from Last 3 Encounters:   09/26/22 97 9 kg (215 lb 12 8 oz)   05/27/22 99 1 kg (218 lb 6 4 oz)   04/06/22 98 kg (216 lb)       Physical Exam  Vitals reviewed  Constitutional:       General: She is not in acute distress  Appearance: Normal appearance  She is well-developed  She is obese  She is not ill-appearing  HENT:      Head: Normocephalic and atraumatic  Neck:      Thyroid: No thyromegaly  Vascular: No carotid bruit  Cardiovascular:      Rate and Rhythm: Normal rate and regular rhythm  Pulses: Normal pulses  Heart sounds: Normal heart sounds  No murmur heard  Pulmonary:      Effort: Pulmonary effort is normal       Breath sounds: Normal breath sounds  No wheezing, rhonchi or rales  Musculoskeletal:      Cervical back: Neck supple  Right lower leg: No edema  Left lower leg: No edema  Lymphadenopathy:      Cervical: No cervical adenopathy  Skin:     General: Skin is warm and dry  Findings: Rash (Crusting clusters of erythematous papules/vesicles on the right forearm) present  Neurological:      Mental Status: She is alert  Psychiatric:         Mood and Affect: Mood normal          Behavior: Behavior normal          Thought Content:  Thought content normal          Judgment: Judgment normal          Pertinent Laboratory/Diagnostic Studies:  Lab Results   Component Value Date    BUN 14 12/23/2021    CREATININE 0 85 12/23/2021    CALCIUM 9 2 12/23/2021    K 4 0 12/23/2021    CO2 29 12/23/2021     12/23/2021     Lab Results   Component Value Date    ALT 18 12/23/2021    AST 11 12/23/2021    ALKPHOS 78 12/23/2021       Lab Results   Component Value Date    WBC 4 55 12/23/2021    HGB 13 6 12/23/2021    HCT 40 9 12/23/2021    MCV 91 12/23/2021     12/23/2021       Lab Results   Component Value Date    TRIG 87 07/28/2022     Lab Results   Component Value Date    HDL 54 07/28/2022     Lab Results   Component Value Date    LDLCALC 86 07/28/2022     Lab Results   Component Value Date    HGBA1C 5 0 07/28/2022       Results for orders placed or performed in visit on 09/14/22   TSH, 3rd generation   Result Value Ref Range    TSH 3RD GENERATON 2 190 0 450 - 4 500 uIU/mL   T3   Result Value Ref Range    T3, Total 1 00 0 60 - 1 80 ng/mL   T4, free   Result Value Ref Range    Free T4 0 90 0 76 - 1 46 ng/dL       Orders Placed This Encounter   Procedures    Mammo screening bilateral w 3d & cad    CBC and differential    Vitamin D 25 hydroxy       ALLERGIES:  No Known Allergies    Current Medications     Current Outpatient Medications   Medication Sig Dispense Refill    clobetasol (TEMOVATE) 0 05 % cream Apply topically 2 (two) times a day (Patient taking differently: Apply topically as needed) 30 g 0    Ibuprofen (ADVIL PO) Take by mouth as needed      liraglutide (SAXENDA) injection Inject 0 6 mg SC QD x 1 week  Increase by 0 6 mg each week until reach max dose of 3 mg daily (1 2 mg QD week 2, 1 8 mg QD week 3, etc )  15 mL 5    magnesium 30 MG tablet Take 30 mg by mouth as needed       Multiple Vitamin (MULTIVITAMIN) tablet Take 1 tablet by mouth as needed       nystatin (MYCOSTATIN) powder Apply topically 3 (three) times a day as needed (candidiasis) 60 g 3    VITAMIN D PO Take 5,000 Units by mouth as needed        No current facility-administered medications for this visit           Health Maintenance     Health Maintenance   Topic Date Due    COVID-19 Vaccine (4 - Booster for [employer] series) 02/06/2022    PT PLAN OF CARE  03/23/2022    Influenza Vaccine (1) 09/01/2022    Annual Physical  12/06/2022    Breast Cancer Screening: Mammogram  12/13/2022    Depression Remission PHQ  12/06/2022    BMI: Adult  09/26/2023    BMI: Followup Plan  09/27/2023    Cervical Cancer Screening  11/10/2024    DTaP,Tdap,and Td Vaccines (3 - Td or Tdap) 10/09/2027    Colorectal Cancer Screening  01/28/2031    HIV Screening  Completed    Hepatitis C Screening  Completed    Pneumococcal Vaccine: Pediatrics (0 to 5 Years) and At-Risk Patients (6 to 59 Years)  Aged Out    HIB Vaccine  Aged Out    Hepatitis B Vaccine  Aged Out    IPV Vaccine  Aged Out    Hepatitis A Vaccine  Aged Bryant Swartz Meningococcal ACWY Vaccine  Aged Out    HPV Vaccine  Aged Out     Immunization History   Administered Date(s) Administered    COVID-19 PFIZER VACCINE 0 3 ML IM 12/18/2020, 01/08/2021, 10/06/2021    Hep B, adult 07/24/2013, 08/26/2013, 03/12/2014    INFLUENZA 01/01/2007, 10/26/2020    Influenza Quadrivalent 3 years and older 10/05/2018    Influenza Quadrivalent Preservative Free 3 years and older IM 10/03/2017    Influenza, seasonal, injectable 10/24/2009, 11/01/2015    Meningococcal, Unknown Serogroups 03/19/2014    Tdap 09/26/2007, 10/09/2017    Tuberculin Skin Test-PPD Intradermal 07/29/2013, 08/12/2013, 03/21/2014       Marcia Vo PA-C  9/27/2022 10:21 AM  [facility]

## 2022-09-26 ENCOUNTER — OFFICE VISIT (OUTPATIENT)
Dept: FAMILY MEDICINE CLINIC | Facility: CLINIC | Age: 52
End: 2022-09-26
Payer: COMMERCIAL

## 2022-09-26 VITALS
HEART RATE: 87 BPM | WEIGHT: 215.8 LBS | HEIGHT: 62 IN | OXYGEN SATURATION: 97 % | DIASTOLIC BLOOD PRESSURE: 70 MMHG | BODY MASS INDEX: 39.71 KG/M2 | SYSTOLIC BLOOD PRESSURE: 110 MMHG | TEMPERATURE: 98.7 F

## 2022-09-26 DIAGNOSIS — G47.33 OBSTRUCTIVE SLEEP APNEA TREATED WITH CONTINUOUS POSITIVE AIRWAY PRESSURE (CPAP): ICD-10-CM

## 2022-09-26 DIAGNOSIS — Z99.89 OBSTRUCTIVE SLEEP APNEA TREATED WITH CONTINUOUS POSITIVE AIRWAY PRESSURE (CPAP): ICD-10-CM

## 2022-09-26 DIAGNOSIS — I78.1 SPIDER VEIN, SYMPTOMATIC: ICD-10-CM

## 2022-09-26 DIAGNOSIS — Z12.31 ENCOUNTER FOR SCREENING MAMMOGRAM FOR BREAST CANCER: ICD-10-CM

## 2022-09-26 DIAGNOSIS — R53.83 FATIGUE, UNSPECIFIED TYPE: ICD-10-CM

## 2022-09-26 DIAGNOSIS — E55.9 VITAMIN D DEFICIENCY: ICD-10-CM

## 2022-09-26 DIAGNOSIS — R76.8 THYROID ANTIBODY POSITIVE: ICD-10-CM

## 2022-09-26 DIAGNOSIS — B37.2 CANDIDAL INTERTRIGO: ICD-10-CM

## 2022-09-26 DIAGNOSIS — L23.7 POISON IVY DERMATITIS: ICD-10-CM

## 2022-09-26 DIAGNOSIS — E66.9 OBESITY WITH BODY MASS INDEX 30 OR GREATER: Primary | ICD-10-CM

## 2022-09-26 PROCEDURE — 99214 OFFICE O/P EST MOD 30 MIN: CPT | Performed by: PHYSICIAN ASSISTANT

## 2022-09-26 RX ORDER — NYSTATIN 100000 [USP'U]/G
POWDER TOPICAL 3 TIMES DAILY PRN
Qty: 60 G | Refills: 3 | Status: SHIPPED | OUTPATIENT
Start: 2022-09-26

## 2022-09-27 DIAGNOSIS — E66.9 OBESITY WITH BODY MASS INDEX 30 OR GREATER: ICD-10-CM

## 2022-09-27 NOTE — TELEPHONE ENCOUNTER
Pt called in stating the pharmacy called her to tell her she needs a prior auth for her Liraglutide  Please fill if appropriate

## 2022-09-27 NOTE — ASSESSMENT & PLAN NOTE
Reviewed that her recent TSH, T3, and T4 were normal   We will need to continue monitoring her levels given her positive antibodies  We discussed the increased likelihood that at some point she will develop true hypothyroidism

## 2022-09-27 NOTE — ASSESSMENT & PLAN NOTE
Patient notes that she will be following up with vascular surgery again later this year to get these addressed

## 2022-09-27 NOTE — ASSESSMENT & PLAN NOTE
Patient states that she is unable to use a CPAP    She was encouraged to discuss getting an oral mandibular advancement device with her dentist

## 2022-09-27 NOTE — ASSESSMENT & PLAN NOTE
Patient takes vitamin-D 5000 units but not consistently  Check level and will adjust dosing as needed

## 2022-09-27 NOTE — ASSESSMENT & PLAN NOTE
BMI Counseling: Body mass index is 39 47 kg/m²  The BMI is above normal  Nutrition recommendations include reducing portion sizes, 3-5 servings of fruits/vegetables daily and increasing intake of lean protein  Exercise recommendations include moderate aerobic physical activity for 150 minutes/week and exercising 3-5 times per week  Pharmacotherapy was ordered for patient to aid in weight loss  Script written for [place] for patient to start  She previously has tried phentermine but felt that it made her heart race  Follow-up in 1 month  We also had extensive discussion about trying to exercise regularly and work on improving her diet  We discussed the importance of staying consistent and establishing new lifestyle patterns as opposed to doing a diet that is short-term and expecting long-term lasting results  Patient declined referral to Nutrition or weight management at this time

## 2022-09-27 NOTE — ASSESSMENT & PLAN NOTE
Encouraged patient to pursue oral mandibular advancement device through her dentist to address her sleep apnea  Reviewed recent normal thyroid function  Check CBC and vitamin-D level

## 2022-09-28 NOTE — TELEPHONE ENCOUNTER
Please submit for prior auth for Saxenda   Patient cannot take phentermine due to palpitations that it caused in the past

## 2022-09-30 ENCOUNTER — TELEPHONE (OUTPATIENT)
Dept: FAMILY MEDICINE CLINIC | Facility: CLINIC | Age: 52
End: 2022-09-30

## 2022-09-30 DIAGNOSIS — E66.9 OBESITY WITH BODY MASS INDEX 30 OR GREATER: Primary | ICD-10-CM

## 2022-09-30 NOTE — TELEPHONE ENCOUNTER
Prior Hiro Barnhart is needed for medication *Liraglutide saxenda     Pt called to check the status

## 2022-10-04 NOTE — TELEPHONE ENCOUNTER
Prior auth submitted, key [id number]    Of note, pen needles were not sent to pharmacy  Will require pen needles if [facility] is approved

## 2022-10-04 NOTE — TELEPHONE ENCOUNTER
Called pharmacy and asked them to start prior auth  When I went into cover my meds, it stated patient was not found under the insurance information we have on file

## 2022-10-11 NOTE — TELEPHONE ENCOUNTER
No prior auth determination  yet  Per CoverMyMeds: If [facility] has not replied within 72 hours for urgent requests and up to 15 days for standard requests, please contact [facility] at [phone]

## 2022-11-04 ENCOUNTER — OFFICE VISIT (OUTPATIENT)
Dept: GASTROENTEROLOGY | Facility: CLINIC | Age: 52
End: 2022-11-04

## 2022-11-04 VITALS
BODY MASS INDEX: 38.64 KG/M2 | TEMPERATURE: 98 F | HEIGHT: 62 IN | DIASTOLIC BLOOD PRESSURE: 80 MMHG | WEIGHT: 210 LBS | SYSTOLIC BLOOD PRESSURE: 118 MMHG

## 2022-11-04 DIAGNOSIS — K64.9 HEMORRHOIDS, UNSPECIFIED HEMORRHOID TYPE: Primary | ICD-10-CM

## 2022-11-04 NOTE — PATIENT INSTRUCTIONS
I would want you to get 10-15 g of fiber via  supplements this includes psyllium, Metamucil, or Benefiber    You can try variation of the fibers to see which 1 is most tolerable for you    Please get about 8-10 cups of water daily    You can start off with Sitz baths 2 times daily, use of squatty potty, and topical ointments of lidocaine and diltiazem  You can also take MiraLax after couple of days if you still are straining because we want to alleviate amount of pressure on your rectum    If symptoms do not improve over the next several days please consider seeing colorectal surgeon after your trip  Will be more than happy to answer any questions or concerns you may have

## 2022-11-06 NOTE — PROGRESS NOTES
[facility] - Outpatient Consultation  Priscila Olivo 46 y o  female MRN: [id number]  Encounter: [id number]          ASSESSMENT AND PLAN:      1  Hemorrhoids, unspecified hemorrhoid type  2  Rectal discomfort  3  History of colon polyps- benign  Repeat colonoscopy is planned in 2022  Colonoscopy in 2021 was within normal limits with multiple benign (hyperplastic polyps) polyps which were removed  - diltiazem (CARDIZEM) 2% cream; Apply topically daily  Dispense: 30 g; Refill: 2  - Ambulatory Referral to Colorectal Surgery; Future  I would want you to get 10-15 g of fiber via  supplements this includes psyllium, Metamucil, or Benefiber  You can try variation of the fibers to see which 1 is most tolerable for you    Please get about 8-10 cups of water daily    You can start off with Sitz baths 2 times daily, use of squatty potty, and topical ointments of lidocaine and diltiazem  You can also take MiraLax after couple of days if you still are straining because we want to alleviate amount of pressure on your rectum    If symptoms do not improve over the next several days please consider seeing colorectal surgeon after your trip  Will be more than happy to answer any questions or concerns you may have  ______________________________________________________________________    HPI:      She is a very pleasant 80-year-old female presents here for rectal discomfort  She is planned to go on vacation next week  She has had rectal discomfort with passage of stool  This is also associated with difficulty in setting  She does have history of constipation has never had pain this severe  She is trying to optimize her fluid and fiber intake  Pain is usually worse with sitting and passage of bowel movement  REVIEW OF SYSTEMS:    CONSTITUTIONAL: Denies any fever, chills, rigors, and weight loss  HEENT: No earache or tinnitus  Denies hearing loss or visual disturbances    CARDIOVASCULAR: No chest pain or palpitations  RESPIRATORY: Denies any cough, hemoptysis, shortness of breath or dyspnea on exertion  GASTROINTESTINAL: As noted in the History of Present Illness  GENITOURINARY: No problems with urination  Denies any hematuria or dysuria  NEUROLOGIC: No dizziness or vertigo, denies headaches  MUSCULOSKELETAL: Denies any muscle or joint pain  SKIN: Denies skin rashes or itching  ENDOCRINE: Denies excessive thirst  Denies intolerance to heat or cold  PSYCHOSOCIAL: Denies depression or anxiety  Denies any recent memory loss         Historical Information   Past Medical History:   Diagnosis Date   • Depression    • Sleep apnea      Past Surgical History:   Procedure Laterality Date   • MOUTH SURGERY     • ROOT CANAL      Apicoectomy; resolved; 07/26/17   • WISDOM TOOTH EXTRACTION      1980's     Social History   Social History     Substance and Sexual Activity   Alcohol Use Yes    Comment: occassional     Social History     Substance and Sexual Activity   Drug Use Never     Social History     Tobacco Use   Smoking Status Never Smoker   Smokeless Tobacco Never Used     Family History   Problem Relation Age of Onset   • Hypertension Mother    • Rheum arthritis Mother    • Diverticulosis Mother         plus diverticulitis   • Cirrhosis Father    • Leukemia Father 61        ALL   • Diabetes Maternal Grandmother         mellitus   • Hypertension Maternal Grandmother         pulmonary   • Cancer Maternal Grandfather 48        face/throat   • Anxiety disorder Paternal Grandmother    • Depression Paternal Grandmother    • Dementia Paternal Grandmother    • Glaucoma Paternal Grandmother    • Hypertension Paternal Grandmother    • Lung cancer Paternal Grandfather 80   • Heart attack Paternal Grandfather    • No Known Problems Sister    • No Known Problems Daughter        Meds/Allergies       Current Outpatient Medications:   •  clobetasol (TEMOVATE) 0 05 % cream  •  diltiazem (CARDIZEM) 2% cream  •  Ibuprofen (ADVIL PO)  •  Insulin Pen Needle 32G X 4 MM MISC  •  liraglutide (SAXENDA) injection  •  magnesium 30 MG tablet  •  Multiple Vitamin (MULTIVITAMIN) tablet  •  nystatin (MYCOSTATIN) powder  •  VITAMIN D PO    No Known Allergies        Objective     Blood pressure 118/80, temperature 98 °F (36 7 °C), temperature source Tympanic, height 5' 2" (1 575 m), weight 95 3 kg (210 lb), not currently breastfeeding  Body mass index is 38 41 kg/m²  PHYSICAL EXAM:      General Appearance:   Alert, cooperative, no distress   HEENT:   Normocephalic, atraumatic, anicteric      Neck:  Supple, symmetrical, trachea midline   Lungs:   Clear to auscultation bilaterally; no rales, rhonchi or wheezing; respirations unlabored    Heart[de-identified]   Regular rate and rhythm; no murmur, rub, or gallop  Abdomen:   Soft, non-tender, non-distended; normal bowel sounds; no masses, no organomegaly    Genitalia:   Deferred    Rectal:   Thrombosed hemorrhoid was identified exam was limited but otherwise within normal limits   Extremities:  No cyanosis, clubbing or edema    Pulses:  2+ and symmetric    Skin:  No jaundice, rashes, or lesions    Lymph nodes:  No palpable cervical lymphadenopathy        Lab Results:   No visits with results within 1 Day(s) from this visit  Latest known visit with results is:   Appointment on 09/14/2022   Component Date Value   • TSH 3RD GENERATON 09/14/2022 2 190    • T3, Total 09/14/2022 1 00    • Free T4 09/14/2022 0 90          Radiology Results:   No results found

## 2022-12-22 ENCOUNTER — HOSPITAL ENCOUNTER (OUTPATIENT)
Dept: MAMMOGRAPHY | Facility: MEDICAL CENTER | Age: 52
Discharge: HOME | End: 2022-12-22

## 2022-12-22 VITALS — WEIGHT: 210.1 LBS | BODY MASS INDEX: 38.66 KG/M2 | HEIGHT: 62 IN

## 2022-12-22 DIAGNOSIS — Z12.31 ENCOUNTER FOR SCREENING MAMMOGRAM FOR BREAST CANCER: ICD-10-CM

## 2022-12-27 ENCOUNTER — TELEPHONE (OUTPATIENT)
Dept: OBGYN CLINIC | Facility: MEDICAL CENTER | Age: 52
End: 2022-12-27

## 2022-12-30 ENCOUNTER — APPOINTMENT (OUTPATIENT)
Dept: LAB | Facility: MEDICAL CENTER | Age: 52
End: 2022-12-30

## 2022-12-30 DIAGNOSIS — E55.9 VITAMIN D DEFICIENCY: ICD-10-CM

## 2022-12-30 DIAGNOSIS — R53.83 FATIGUE, UNSPECIFIED TYPE: ICD-10-CM

## 2022-12-30 LAB
25(OH)D3 SERPL-MCNC: 17.9 NG/ML (ref 30–100)
BASOPHILS # BLD AUTO: 0.02 THOUSANDS/ÂΜL (ref 0–0.1)
BASOPHILS NFR BLD AUTO: 0 % (ref 0–1)
EOSINOPHIL # BLD AUTO: 0.06 THOUSAND/ÂΜL (ref 0–0.61)
EOSINOPHIL NFR BLD AUTO: 1 % (ref 0–6)
ERYTHROCYTE [DISTWIDTH] IN BLOOD BY AUTOMATED COUNT: 13.1 % (ref 11.6–15.1)
HCT VFR BLD AUTO: 40.9 % (ref 34.8–46.1)
HGB BLD-MCNC: 13.5 G/DL (ref 11.5–15.4)
IMM GRANULOCYTES # BLD AUTO: 0.02 THOUSAND/UL (ref 0–0.2)
IMM GRANULOCYTES NFR BLD AUTO: 0 % (ref 0–2)
LYMPHOCYTES # BLD AUTO: 1.38 THOUSANDS/ÂΜL (ref 0.6–4.47)
LYMPHOCYTES NFR BLD AUTO: 28 % (ref 14–44)
MCH RBC QN AUTO: 30.1 PG (ref 26.8–34.3)
MCHC RBC AUTO-ENTMCNC: 33 G/DL (ref 31.4–37.4)
MCV RBC AUTO: 91 FL (ref 82–98)
MONOCYTES # BLD AUTO: 0.37 THOUSAND/ÂΜL (ref 0.17–1.22)
MONOCYTES NFR BLD AUTO: 8 % (ref 4–12)
NEUTROPHILS # BLD AUTO: 3.1 THOUSANDS/ÂΜL (ref 1.85–7.62)
NEUTS SEG NFR BLD AUTO: 63 % (ref 43–75)
NRBC BLD AUTO-RTO: 0 /100 WBCS
PLATELET # BLD AUTO: 218 THOUSANDS/UL (ref 149–390)
PMV BLD AUTO: 9.9 FL (ref 8.9–12.7)
RBC # BLD AUTO: 4.48 MILLION/UL (ref 3.81–5.12)
WBC # BLD AUTO: 4.95 THOUSAND/UL (ref 4.31–10.16)

## 2023-01-02 DIAGNOSIS — E55.9 VITAMIN D DEFICIENCY: Primary | ICD-10-CM

## 2023-01-02 RX ORDER — CHOLECALCIFEROL (VITAMIN D3) 50 MCG
2000 TABLET ORAL DAILY
Qty: 90 TABLET | Refills: 3 | Status: SHIPPED | OUTPATIENT
Start: 2023-01-02

## 2023-01-02 RX ORDER — ERGOCALCIFEROL 1.25 MG/1
50000 CAPSULE ORAL WEEKLY
Qty: 12 CAPSULE | Refills: 0 | Status: SHIPPED | OUTPATIENT
Start: 2023-01-02 | End: 2023-05-17

## 2023-01-18 ENCOUNTER — ANNUAL EXAM (OUTPATIENT)
Dept: OBGYN CLINIC | Facility: MEDICAL CENTER | Age: 53
End: 2023-01-18

## 2023-01-18 VITALS
BODY MASS INDEX: 38.28 KG/M2 | SYSTOLIC BLOOD PRESSURE: 110 MMHG | HEIGHT: 62 IN | DIASTOLIC BLOOD PRESSURE: 76 MMHG | WEIGHT: 208 LBS

## 2023-01-18 DIAGNOSIS — Z12.31 ENCOUNTER FOR SCREENING MAMMOGRAM FOR MALIGNANT NEOPLASM OF BREAST: Primary | ICD-10-CM

## 2023-01-18 NOTE — PROGRESS NOTES
ASSESSMENT & PLAN: Juan A Sampson is a 46 y o  [id number] with normal gynecologic exam     1   Routine well woman exam done today  2  Pap and HPV:  The patient's last pap and hpv was   It was normal     Pap with cotesting was not done today  Current ASCCP Guidelines reviewed  3   Mammogram ordered  4  Colorectal cancer screening was not ordered  5  The following were reviewed in today's visit: breast self exam, mammography screening ordered, menopause, exercise and healthy diet      CC:  Annual Gynecologic Examination    HPI: Juan A Sampson is a 46 y o  [id number] who presents for annual gynecologic examination  She has the following concerns:  Irregular cycles      Health Maintenance:    She wears her seatbelt routinely  She does perform regular monthly self breast exams  She feels safe at home       Past Medical History:   Diagnosis Date   • Depression    • Sleep apnea        Past Surgical History:   Procedure Laterality Date   • MOUTH SURGERY     • ROOT CANAL      Apicoectomy; resolved; 17   • WISDOM TOOTH EXTRACTION             Past OB/Gyn History:  OB History        3    Para   3    Term   3            AB        Living   3       SAB        IAB        Ectopic        Multiple        Live Births   3                 Family History   Problem Relation Age of Onset   • Hypertension Mother    • Rheum arthritis Mother    • Diverticulosis Mother         plus diverticulitis   • Cirrhosis Father    • Leukemia Father 61        ALL   • Diabetes Maternal Grandmother         mellitus   • Hypertension Maternal Grandmother         pulmonary   • Cancer Maternal Grandfather 48        face/throat   • Anxiety disorder Paternal Grandmother    • Depression Paternal Grandmother    • Dementia Paternal Grandmother    • Glaucoma Paternal Grandmother    • Hypertension Paternal Grandmother    • Lung cancer Paternal Grandfather 80   • Heart attack Paternal Grandfather    • No Known Problems Sister    • No Known Problems Daughter        Social History:  Social History     Socioeconomic History   • Marital status: /Civil Union     Spouse name: Not on file   • Number of children: Not on file   • Years of education: college student   • Highest education level: Not on file   Occupational History   • Occupation: employed   Tobacco Use   • Smoking status: Never   • Smokeless tobacco: Never   Vaping Use   • Vaping Use: Never used   Substance and Sexual Activity   • Alcohol use: Yes     Comment: occassional   • Drug use: Never   • Sexual activity: Yes     Partners: Male     Birth control/protection: Male Sterilization, None   Other Topics Concern   • Not on file   Social History Narrative    Daily coffee consumption 2 cups a day    Household: Mother     Social Determinants of Health     Financial Resource Strain: Not on file   Food Insecurity: Not on file   Transportation Needs: Not on file   Physical Activity: Not on file   Stress: Not on file   Social Connections: Not on file   Intimate Partner Violence: Not on file   Housing Stability: Not on file       No Known Allergies    Current Outpatient Medications:   •  Cholecalciferol (Vitamin D) 50 MCG (2000 UT) tablet, Take 1 tablet (2,000 Units total) by mouth daily, Disp: 90 tablet, Rfl: 3  •  clobetasol (TEMOVATE) 0 05 % cream, Apply topically 2 (two) times a day (Patient taking differently: Apply topically as needed), Disp: 30 g, Rfl: 0  •  diltiazem (CARDIZEM) 2% cream, Apply topically daily, Disp: 30 g, Rfl: 2  •  ergocalciferol (VITAMIN D2) 50,000 units, Take 1 capsule (50,000 Units total) by mouth once a week, Disp: 12 capsule, Rfl: 0  •  Ibuprofen (ADVIL PO), Take by mouth as needed, Disp: , Rfl:   •  Insulin Pen Needle 32G X 4 MM MISC, Use once daily with Saxenda , Disp: 100 each, Rfl: 3  •  liraglutide (SAXENDA) injection, Inject 0 6 mg SC QD x 1 week   Increase by 0 6 mg each week until reach max dose of 3 mg daily (1 2 mg QD week 2, 1 8 mg QD week 3, etc ) , Disp: 15 mL, Rfl: 5  •  Multiple Vitamin (MULTIVITAMIN) tablet, Take 1 tablet by mouth as needed , Disp: , Rfl:   •  nystatin (MYCOSTATIN) powder, Apply topically 3 (three) times a day as needed (candidiasis), Disp: 60 g, Rfl: 3  •  VITAMIN D PO, Take 5,000 Units by mouth as needed , Disp: , Rfl:   •  magnesium 30 MG tablet, Take 30 mg by mouth as needed  (Patient not taking: Reported on 1/18/2023), Disp: , Rfl:       Review of Systems  Constitutional :no fever, feels well, no tiredness, no recent weight gain or loss  ENT: no ear ache, no loss of hearing, no nosebleeds or nasal discharge, no sore throat or hoarseness  Cardiovascular: no complaints of slow or fast heart beat, no chest pain, no palpitations, no leg claudication or lower extremity edema  Respiratory: no complaints of shortness of shortness of breath, no BERNAL  Breasts:no complaints of breast pain, breast lump, or nipple discharge  Gastrointestinal: no complaints of abdominal pain, constipation, nausea, vomiting, or diarrhea or bloody stools  Genitourinary : no complaints of dysuria, incontinence, pelvic pain, no dysmenorrhea, vaginal discharge or abnormal vaginal bleeding and as noted in HPI  Musculoskeletal: no complaints of arthralgia, no myalgia, no joint swelling or stiffness, no limb pain or swelling  Integumentary: no complaints of skin rash or lesion, itching or dry skin  Neurological: no complaints of headache, no confusion, no numbness or tingling, no dizziness or fainting    Objective      /76   Ht 5' 2" (1 575 m)   Wt 94 3 kg (208 lb)   LMP 12/10/2022 (Exact Date)   BMI 38 04 kg/m²     General:   appears stated age, cooperative, alert normal mood and affect   Lungs: Unlabored breathing    Breasts: normal appearance, no masses or tenderness   Abdomen: soft, non-tender, without masses or organomegaly   Vulva: normal   Vagina: normal vagina, no discharge, exudate, lesion, or erythema   Urethra: normal   Cervix: Normal, no discharge  Nontender     Uterus: normal size, contour, position, consistency, mobility, non-tender   Adnexa: no mass, fullness, tenderness   Psychiatric orientation to person, place, and time: normal  mood and affect: normal

## 2023-02-01 DIAGNOSIS — E66.9 OBESITY WITH BODY MASS INDEX 30 OR GREATER: ICD-10-CM

## 2023-02-02 NOTE — TELEPHONE ENCOUNTER
Please confirm that the patient was able to increase all the way to 3 mg daily so the directions can be updated before I send it over

## 2023-02-07 NOTE — TELEPHONE ENCOUNTER
Please try again to confirm with the patient if she has been able to increase to 3 mg daily  Please also confirm a new prescription is needed since refills sent should have covered her until end of March

## 2023-02-08 NOTE — TELEPHONE ENCOUNTER
Date/Time: 2-8-23 / 11:38 AM    Pt returned call and stated she has increased to the 3 mg daily, and she believes she is good with the refills

## 2023-02-08 NOTE — TELEPHONE ENCOUNTER
2nd attempt at calling pt again and LVM to confirm that she has been able to increase to 3 mg daily, and to confirm about needing a new prescription

## 2023-03-21 ENCOUNTER — TELEPHONE (OUTPATIENT)
Dept: VASCULAR SURGERY | Facility: CLINIC | Age: 53
End: 2023-03-21

## 2023-03-21 NOTE — TELEPHONE ENCOUNTER
This is a reminder; patient is due for injections . Please call patient and schedule the following by the dates provided. Called and spoke with pt. They would like to wait until Sept 2023 to schedule apt    Patient's appointment(s) are due on or after Sept 2023.     Dopplers  [] Abdominal Aorta Iliac (AOIL)  [] Carotid (CV)   [] Celiac and/or Mesenteric  [] Endovascular Aortic Repair (EVAR)   [] Hemodialysis Access (HD)   [] Lower Limb Arterial (ATILIO)  [] Lower Limb Venous (LEV)  [] Lower Limb Venous Duplex with Reflux (LEVDR)  [] Renal Artery  [] Upper Limb Arterial (UEA)    [] Upper Limb Venous (UEV)              [] DEREK and Waveform analysis     Advanced Imaging   [] CTA head/neck    [] CTA abdomen    [] CTA abdomen & pelvis    [] CT abdomen with/ without contrast  [] CT abdomen with contrast  [] CT abdomen without contrast    [] CT abdomen & pelvis with/ without contrast  [] CT abdomen & pelvis with contrast  [] CT abdomen & pelvis without contrast    Office Visit   [] New patient, patient last seen over 3 years ago  [] Risk factor modification (RFM)   [x] Follow up SELF PAY - Sclera inj Full set A sclero 0.5% solution, 10 mL, 90 minutes,$500 [facility] [place] or [facility] office  [] Lost to follow up (LTFU)

## 2023-04-03 ENCOUNTER — TELEPHONE (OUTPATIENT)
Dept: FAMILY MEDICINE CLINIC | Facility: CLINIC | Age: 53
End: 2023-04-03

## 2023-04-03 NOTE — TELEPHONE ENCOUNTER
I would suggest confirming with the patient what she is taking  I would expect that she would be up to 3 mg daily by now, but maybe she had symptoms that prevented her from increasing all the way to 3 mg  That could be why she has a lot remaining

## 2023-04-03 NOTE — TELEPHONE ENCOUNTER
"Received PA request for Saxenda 18MG/3ML from [facility]; however, I do not see a recent fill request/approval for this medication with new dosing instructions  P/C to pharmacy - Patient was given two fills in December  She still has 45ML remaining which is 3 refills  Pharmacy confirmed they do not need a new RX at this time, but can you please confirm that the new dosing instructions at this time for the PA would be \"Inject 3 mg SC daily\"? Thank you!     "

## 2023-04-04 NOTE — TELEPHONE ENCOUNTER
Spoke with patient and she conformed she is taking 3 mg Saxenda   She sed pharmacy need new prior authorization for Saxenda

## 2023-05-17 ENCOUNTER — OFFICE VISIT (OUTPATIENT)
Dept: FAMILY MEDICINE CLINIC | Facility: CLINIC | Age: 53
End: 2023-05-17

## 2023-05-17 VITALS
HEART RATE: 78 BPM | RESPIRATION RATE: 12 BRPM | OXYGEN SATURATION: 97 % | SYSTOLIC BLOOD PRESSURE: 100 MMHG | WEIGHT: 198.6 LBS | TEMPERATURE: 97.7 F | BODY MASS INDEX: 36.55 KG/M2 | DIASTOLIC BLOOD PRESSURE: 60 MMHG | HEIGHT: 62 IN

## 2023-05-17 DIAGNOSIS — G47.33 OBSTRUCTIVE SLEEP APNEA: ICD-10-CM

## 2023-05-17 DIAGNOSIS — Z23 ENCOUNTER FOR IMMUNIZATION: ICD-10-CM

## 2023-05-17 DIAGNOSIS — E55.9 VITAMIN D DEFICIENCY: ICD-10-CM

## 2023-05-17 DIAGNOSIS — E66.9 OBESITY WITH BODY MASS INDEX 30 OR GREATER: ICD-10-CM

## 2023-05-17 DIAGNOSIS — Z00.00 ANNUAL PHYSICAL EXAM: Primary | ICD-10-CM

## 2023-05-17 DIAGNOSIS — I78.1 SPIDER VEIN, SYMPTOMATIC: ICD-10-CM

## 2023-05-17 DIAGNOSIS — G25.81 RLS (RESTLESS LEGS SYNDROME): ICD-10-CM

## 2023-05-17 RX ORDER — ROPINIROLE 0.25 MG/1
0.25 TABLET, FILM COATED ORAL
Qty: 30 TABLET | Refills: 0 | Status: SHIPPED | OUTPATIENT
Start: 2023-05-17

## 2023-05-17 NOTE — PROGRESS NOTES
ADULT ANNUAL [facility]    NAME: Veronica Ny  AGE: 48 y o  SEX: female  : 1970     DATE: 2023     Assessment and Plan:     Problem List Items Addressed This Visit        Respiratory    Obstructive sleep apnea     Patient was unable to use CPAP due to repeatedly taking it off in her sleep  She was reminded to discuss possible oral mandibular advancement device with her dentist at her next visit  We reviewed that this may help with her waking up not feeling rested if she can address her sleep apnea  Cardiovascular and Mediastinum    Spider vein, symptomatic     Patient reports that she plans on potentially getting sclerotherapy in the fall  She will follow-up with vascular surgery as directed/able  Other    Obesity with body mass index 30 or greater     Continue Saxenda 3 mg daily  She was encouraged to reach out with any severe side effects  Otherwise, follow-up in 3 months  BMI Counseling: Body mass index is 36 32 kg/m²  The BMI is above normal  Nutrition recommendations include 3-5 servings of fruits/vegetables daily  Exercise recommendations include exercising 3-5 times per week  Pharmacotherapy was ordered for patient to aid in weight loss  Vitamin D deficiency     Currently on vitamin D 2000 units daily  RLS (restless legs syndrome)     Given prescription for Requip 0 25 mg to take at bedtime  Reviewed labs did not show evidence of iron deficiency  Relevant Medications    rOPINIRole (REQUIP) 0 25 mg tablet   Other Visit Diagnoses     Annual physical exam    -  Primary    Encounter for immunization        Relevant Orders    Zoster Vaccine Recombinant IM (Completed)          Immunizations and preventive care screenings were discussed with patient today  Appropriate education was printed on patient's after visit summary      Counseling:  · Exercise: the importance of regular exercise/physical activity was discussed  Recommend exercise 3-5 times per week for at least 30 minutes  BMI Counseling: Body mass index is 36 32 kg/m²  The BMI is above normal  Nutrition recommendations include encouraging healthy choices of fruits and vegetables  Exercise recommendations include exercising 3-5 times per week  Rationale for BMI follow-up plan is due to patient being overweight or obese  Return in about 3 months (around 8/17/2023) for Recheck  Chief Complaint:     Chief Complaint   Patient presents with   • Physical Exam     Pt received shingles first shot in left deltoid  Pt tolerated injection well  History of Present Illness:     Adult Annual Physical   Patient here for a comprehensive physical exam  The patient reports problems - as below  unable to use CPAP due to taking it off - has not looked into ILDA device - does not wake up feeling rested - only sleeps 5-6 hours a night but averages about 7-8 hours a night    no recent vascular follow-up for spider veins - no sclero injections yet due to getting COVID and then going on vacation - maybe in the fall    done with ergo and now on 2000 units of vit D daily- not sure if energy improved    doing Saxenda 3 mg daily - notes that she had missed it for a month due to lapse in prior auth but is back on it - had sense of food sitting up into lower esophagus - notes that she vomited a few times due to this - has not vomited in a while - notes dry mouth happens from it at times         Diet and Physical Activity  · Diet/Nutrition: consuming 3-5 servings of fruits/vegetables daily  · Exercise: joined gym but not going        Depression Screening  PHQ-2/9 Depression Screening    Little interest or pleasure in doing things: 0 - not at all  Feeling down, depressed, or hopeless: 0 - not at all  Trouble falling or staying asleep, or sleeping too much: 0 - not at all  Feeling tired or having little energy: 1 - several days  Poor appetite or overeatin - several days  Feeling bad about yourself - or that you are a failure or have let yourself or your family down: 0 - not at all  Trouble concentrating on things, such as reading the newspaper or watching television: 0 - not at all  Moving or speaking so slowly that other people could have noticed  Or the opposite - being so fidgety or restless that you have been moving around a lot more than usual: 0 - not at all  Thoughts that you would be better off dead, or of hurting yourself in some way: 0 - not at all  PHQ-9 Score: 2   PHQ-9 Interpretation: No or Minimal depression        General Health  · Sleep: as above  · Hearing: normal - bilateral  She notes that she has an increase in sensitivity to loud noise, phone ringing, etc    · Vision: goes for regular eye exams and wears glasses  · Dental: regular dental visits  /GYN Health  · Patient is: perimenopausal  · Last menstrual period: 12/10/2022       Review of Systems:     Review of Systems   Constitutional: Negative for chills and fever  HENT: Positive for rhinorrhea (resolved but had after wedding)  Negative for congestion and sore throat  Eyes: Negative for visual disturbance  Respiratory: Negative for cough, shortness of breath and wheezing  Cardiovascular: Negative for chest pain, palpitations and leg swelling  Gastrointestinal: Negative for abdominal pain, blood in stool, constipation, diarrhea, nausea and vomiting  Endocrine: Negative for polydipsia and polyuria  Genitourinary: Negative for dysuria and frequency  Musculoskeletal: Negative for arthralgias and myalgias  Skin: Negative for rash  Neurological: Positive for dizziness (vertigo) and light-headedness (occ - change in position)  Negative for syncope and headaches  Hematological: Does not bruise/bleed easily  Psychiatric/Behavioral: Negative for dysphoric mood  The patient is not nervous/anxious         Past Medical History:     Past Medical History:   Diagnosis Date   • Depression    • Sleep apnea       Past Surgical History:     Past Surgical History:   Procedure Laterality Date   • MOUTH SURGERY     • ROOT CANAL      Apicoectomy; resolved; 07/26/17   • WISDOM TOOTH EXTRACTION      1980's      Social History:     Social History     Socioeconomic History   • Marital status: /Civil Union     Spouse name: None   • Number of children: None   • Years of education: college student   • Highest education level: None   Occupational History   • Occupation: employed   Tobacco Use   • Smoking status: Never   • Smokeless tobacco: Never   Vaping Use   • Vaping Use: Never used   Substance and Sexual Activity   • Alcohol use: Yes     Comment: occasional/social - about 1-2 per week   • Drug use: Never   • Sexual activity: Yes     Partners: Male     Birth control/protection: Male Sterilization, None   Other Topics Concern   • None   Social History Narrative    Daily coffee consumption 2 cups a day    Household:  Mother     Social Determinants of Health     Financial Resource Strain: Not on file   Food Insecurity: Not on file   Transportation Needs: Not on file   Physical Activity: Not on file   Stress: Not on file   Social Connections: Not on file   Intimate Partner Violence: Not on file   Housing Stability: Not on file      Family History:     Family History   Problem Relation Age of Onset   • Hypertension Mother    • Rheum arthritis Mother    • Diverticulosis Mother         plus diverticulitis   • Cataracts Mother    • Macular degeneration Mother    • Cirrhosis Father    • Leukemia Father 61        ALL   • No Known Problems Sister    • No Known Problems Daughter    • Diabetes Maternal Grandmother         mellitus   • Hypertension Maternal Grandmother         pulmonary   • Cancer Maternal Grandfather 48        face/throat   • Anxiety disorder Paternal Grandmother    • Depression Paternal Grandmother    • Dementia Paternal Grandmother    • Glaucoma Paternal "Grandmother    • Hypertension Paternal Grandmother    • Lung cancer Paternal Grandfather 80   • Heart attack Paternal Grandfather       Current Medications:     Current Outpatient Medications   Medication Sig Dispense Refill   • Cholecalciferol (Vitamin D) 50 MCG (2000 UT) tablet Take 1 tablet (2,000 Units total) by mouth daily 90 tablet 3   • clobetasol (TEMOVATE) 0 05 % cream Apply topically 2 (two) times a day (Patient taking differently: Apply topically as needed) 30 g 0   • Ibuprofen (ADVIL PO) Take by mouth as needed     • Insulin Pen Needle 32G X 4 MM MISC Use once daily with Saxenda  100 each 3   • liraglutide (SAXENDA) injection Inject 0 5 mL (3 mg total) under the skin daily 15 mL 2   • Multiple Vitamin (MULTIVITAMIN) tablet Take 1 tablet by mouth in the morning     • nystatin (MYCOSTATIN) powder Apply topically 3 (three) times a day as needed (candidiasis) 60 g 3   • psyllium (METAMUCIL) 58 6 % powder Take 1 packet by mouth 2 (two) times a day     • rOPINIRole (REQUIP) 0 25 mg tablet Take 1 tablet (0 25 mg total) by mouth daily at bedtime 30 tablet 0   • diltiazem (CARDIZEM) 2% cream Apply topically daily (Patient not taking: Reported on 5/17/2023) 30 g 2     No current facility-administered medications for this visit  Allergies:     No Known Allergies   Physical Exam:     /60 (BP Location: Left arm, Patient Position: Sitting, Cuff Size: Large)   Pulse 78   Temp 97 7 °F (36 5 °C) (Temporal)   Resp 12   Ht 5' 2\" (1 575 m)   Wt 90 1 kg (198 lb 9 6 oz)   SpO2 97%   BMI 36 32 kg/m²     Physical Exam  Vitals reviewed  Constitutional:       General: She is not in acute distress  Appearance: Normal appearance  She is well-developed  She is obese  She is not ill-appearing  HENT:      Head: Normocephalic and atraumatic        Right Ear: Tympanic membrane, ear canal and external ear normal       Left Ear: Tympanic membrane, ear canal and external ear normal       Nose: Nose normal  No " congestion  Mouth/Throat:      Mouth: Mucous membranes are moist       Pharynx: No oropharyngeal exudate  Eyes:      Conjunctiva/sclera: Conjunctivae normal       Pupils: Pupils are equal, round, and reactive to light  Neck:      Thyroid: No thyromegaly  Cardiovascular:      Rate and Rhythm: Normal rate and regular rhythm  Pulses: Normal pulses  Heart sounds: Normal heart sounds  No murmur heard  Pulmonary:      Effort: Pulmonary effort is normal       Breath sounds: Normal breath sounds  No wheezing, rhonchi or rales  Abdominal:      General: Bowel sounds are normal  There is no distension  Palpations: Abdomen is soft  There is no mass  Tenderness: There is no abdominal tenderness  Musculoskeletal:      Cervical back: Normal range of motion and neck supple  Right lower leg: No edema  Left lower leg: No edema  Lymphadenopathy:      Cervical: No cervical adenopathy  Skin:     General: Skin is warm and dry  Findings: No rash  Neurological:      Mental Status: She is alert  Sensory: No sensory deficit  Motor: No weakness  Comments: 5/5 strength in UE and LE   Psychiatric:         Mood and Affect: Mood normal          Behavior: Behavior normal          Thought Content:  Thought content normal          Judgment: Judgment normal           Darnell Cisneros PA-C  [facility]

## 2023-05-18 PROBLEM — G25.81 RLS (RESTLESS LEGS SYNDROME): Status: ACTIVE | Noted: 2023-05-18

## 2023-05-18 NOTE — ASSESSMENT & PLAN NOTE
Given prescription for Requip 0 25 mg to take at bedtime  Reviewed labs did not show evidence of iron deficiency

## 2023-05-18 NOTE — ASSESSMENT & PLAN NOTE
Patient was unable to use CPAP due to repeatedly taking it off in her sleep  She was reminded to discuss possible oral mandibular advancement device with her dentist at her next visit  We reviewed that this may help with her waking up not feeling rested if she can address her sleep apnea

## 2023-05-18 NOTE — ASSESSMENT & PLAN NOTE
Patient reports that she plans on potentially getting sclerotherapy in the fall  She will follow-up with vascular surgery as directed/able

## 2023-05-18 NOTE — ASSESSMENT & PLAN NOTE
Continue Saxenda 3 mg daily  She was encouraged to reach out with any severe side effects  Otherwise, follow-up in 3 months  BMI Counseling: Body mass index is 36 32 kg/m²  The BMI is above normal  Nutrition recommendations include 3-5 servings of fruits/vegetables daily  Exercise recommendations include exercising 3-5 times per week  Pharmacotherapy was ordered for patient to aid in weight loss

## 2023-06-29 ENCOUNTER — APPOINTMENT (OUTPATIENT)
Dept: LAB | Facility: MEDICAL CENTER | Age: 53
End: 2023-06-29

## 2023-06-29 DIAGNOSIS — Z00.8 HEALTH EXAMINATION IN POPULATION SURVEY: ICD-10-CM

## 2023-06-29 LAB
CHOLEST SERPL-MCNC: 157 MG/DL
EST. AVERAGE GLUCOSE BLD GHB EST-MCNC: 94 MG/DL
HBA1C MFR BLD: 4.9 %
HDLC SERPL-MCNC: 56 MG/DL
LDLC SERPL CALC-MCNC: 81 MG/DL (ref 0–100)
NONHDLC SERPL-MCNC: 101 MG/DL
TRIGL SERPL-MCNC: 100 MG/DL

## 2023-06-29 PROCEDURE — 83036 HEMOGLOBIN GLYCOSYLATED A1C: CPT

## 2023-06-29 PROCEDURE — 80061 LIPID PANEL: CPT

## 2023-06-29 PROCEDURE — 36415 COLL VENOUS BLD VENIPUNCTURE: CPT

## 2023-07-05 ENCOUNTER — OFFICE VISIT (OUTPATIENT)
Dept: FAMILY MEDICINE CLINIC | Facility: CLINIC | Age: 53
End: 2023-07-05
Payer: COMMERCIAL

## 2023-07-05 VITALS
BODY MASS INDEX: 36.4 KG/M2 | OXYGEN SATURATION: 96 % | WEIGHT: 199 LBS | SYSTOLIC BLOOD PRESSURE: 118 MMHG | DIASTOLIC BLOOD PRESSURE: 72 MMHG | HEART RATE: 83 BPM

## 2023-07-05 DIAGNOSIS — R21 RASH: Primary | ICD-10-CM

## 2023-07-05 PROCEDURE — 99214 OFFICE O/P EST MOD 30 MIN: CPT | Performed by: INTERNAL MEDICINE

## 2023-07-05 RX ORDER — DOXYCYCLINE HYCLATE 100 MG/1
100 CAPSULE ORAL EVERY 12 HOURS SCHEDULED
Qty: 14 CAPSULE | Refills: 0 | Status: SHIPPED | OUTPATIENT
Start: 2023-07-05 | End: 2023-07-12

## 2023-07-05 NOTE — PROGRESS NOTES
Assessment/Plan:    Rash  Clinical presentation consistent with a bull's-eye. She is currently on doxycycline, will do total 21 days of Doxy. Side effects discussed. Discontinue Bactrim. For pain continue with as needed Tylenol and NSAIDs. Diagnoses and all orders for this visit:    Rash  -     doxycycline hyclate (VIBRAMYCIN) 100 mg capsule; Take 1 capsule (100 mg total) by mouth every 12 (twelve) hours for 7 days          Subjective:      Patient ID: Dexter Perez is a 48 y.o. female. Patient came today with a new rash on her back that started about a week ago, she cannot exclude insect bite. She was initially started on Bactrim and then switched to doxycycline. Insect Bite  Associated symptoms include a rash. Pertinent negatives include no chills or fever. The following portions of the patient's history were reviewed and updated as appropriate: allergies, current medications, past family history, past medical history, past social history, past surgical history, and problem list.    Review of Systems   Constitutional: Negative for chills and fever. Skin: Positive for color change, rash and wound.          Objective:      /72 (BP Location: Left arm, Patient Position: Sitting, Cuff Size: Large)   Pulse 83   Wt 90.3 kg (199 lb)   SpO2 96%   BMI 36.40 kg/m²     No Known Allergies       Current Outpatient Medications:   •  Cholecalciferol (Vitamin D) 50 MCG (2000 UT) tablet, Take 1 tablet (2,000 Units total) by mouth daily, Disp: 90 tablet, Rfl: 3  •  doxycycline hyclate (VIBRAMYCIN) 100 mg capsule, Take 1 capsule (100 mg total) by mouth every 12 (twelve) hours for 7 days, Disp: 14 capsule, Rfl: 0  •  Ibuprofen (ADVIL PO), Take by mouth as needed, Disp: , Rfl:   •  liraglutide (SAXENDA) injection, Inject 0.5 mL (3 mg total) under the skin daily, Disp: 15 mL, Rfl: 2  •  Multiple Vitamin (MULTIVITAMIN) tablet, Take 1 tablet by mouth in the morning, Disp: , Rfl:   •  nystatin (MYCOSTATIN) powder, Apply topically 3 (three) times a day as needed (candidiasis), Disp: 60 g, Rfl: 3  •  psyllium (METAMUCIL) 58.6 % powder, Take 1 packet by mouth 2 (two) times a day, Disp: , Rfl:   •  rOPINIRole (REQUIP) 0.25 mg tablet, Take 1 tablet (0.25 mg total) by mouth daily at bedtime, Disp: 30 tablet, Rfl: 0  •  clobetasol (TEMOVATE) 0.05 % cream, Apply topically 2 (two) times a day (Patient not taking: Reported on 7/5/2023), Disp: 30 g, Rfl: 0  •  diltiazem (CARDIZEM) 2% cream, Apply topically daily (Patient not taking: Reported on 5/17/2023), Disp: 30 g, Rfl: 2  •  Insulin Pen Needle 32G X 4 MM MISC, Use once daily with Saxenda., Disp: 100 each, Rfl: 3     There are no Patient Instructions on file for this visit.         Physical Exam

## 2023-07-05 NOTE — ASSESSMENT & PLAN NOTE
Clinical presentation consistent with a bull's-eye. She is currently on doxycycline, will do total 21 days of Doxy. Side effects discussed. Discontinue Bactrim. For pain continue with as needed Tylenol and NSAIDs.

## 2023-07-18 ENCOUNTER — TELEPHONE (OUTPATIENT)
Dept: FAMILY MEDICINE CLINIC | Facility: CLINIC | Age: 53
End: 2023-07-18

## 2023-07-18 NOTE — TELEPHONE ENCOUNTER
PATIENT WAS IN FOR A BUG BITE ON BACK AND DR. PAZ  PUT PATIENT ON  DOXYCLYINE AND WAS TAKING IT BID FOR THREE WEEKS  HOWEVER SHE ONLY HAD TWO WEEKS OF PILLS AND DR. PAZ SAID HE WILL SEND IN 1 WEEKS EXTRA. PT WOULD LIKE THIS SENT TO [address]. PATIENT SAID THE MEDICINE HAS BEEN EFFECTIVE MAYBE 75% BETTER . BUT STILL A LITTLE NASTY PATIENT WOULD LIKE TO BE CALLED WHEN SCRIPT IS SENT IN.

## 2023-07-19 DIAGNOSIS — R21 RASH: Primary | ICD-10-CM

## 2023-07-19 RX ORDER — DOXYCYCLINE HYCLATE 100 MG/1
100 CAPSULE ORAL EVERY 12 HOURS SCHEDULED
Qty: 14 CAPSULE | Refills: 0 | Status: SHIPPED | OUTPATIENT
Start: 2023-07-19 | End: 2023-07-26

## 2023-08-21 ENCOUNTER — OFFICE VISIT (OUTPATIENT)
Dept: FAMILY MEDICINE CLINIC | Facility: CLINIC | Age: 53
End: 2023-08-21
Payer: COMMERCIAL

## 2023-08-21 VITALS
SYSTOLIC BLOOD PRESSURE: 124 MMHG | BODY MASS INDEX: 36.58 KG/M2 | HEART RATE: 79 BPM | RESPIRATION RATE: 12 BRPM | OXYGEN SATURATION: 97 % | WEIGHT: 198.8 LBS | HEIGHT: 62 IN | TEMPERATURE: 97.1 F | DIASTOLIC BLOOD PRESSURE: 60 MMHG

## 2023-08-21 DIAGNOSIS — E66.9 OBESITY WITH BODY MASS INDEX 30 OR GREATER: ICD-10-CM

## 2023-08-21 DIAGNOSIS — I78.1 SPIDER VEIN, SYMPTOMATIC: ICD-10-CM

## 2023-08-21 DIAGNOSIS — G47.33 OBSTRUCTIVE SLEEP APNEA: Primary | ICD-10-CM

## 2023-08-21 DIAGNOSIS — E55.9 VITAMIN D DEFICIENCY: ICD-10-CM

## 2023-08-21 DIAGNOSIS — G25.81 RLS (RESTLESS LEGS SYNDROME): ICD-10-CM

## 2023-08-21 PROCEDURE — 99214 OFFICE O/P EST MOD 30 MIN: CPT | Performed by: PHYSICIAN ASSISTANT

## 2023-08-21 NOTE — PROGRESS NOTES
FAMILY PRACTICE OFFICE VISIT  [facility] - [facility]       NAME: Dave Valles  AGE: 48 y.o. SEX: female       : 1970        MRN: [id number]    DATE: 2023  TIME: 3:18 PM    Assessment and Plan     Problem List Items Addressed This Visit        Respiratory    Obstructive sleep apnea - Primary     Unable to tolerate CPAP use. She will discuss the possibility of an oral mandibular advancement device when she sees her dentist later this week. Cardiovascular and Mediastinum    Spider vein, symptomatic     Patient will be reaching out to vascular to schedule possible sclerotherapy injections. Other    Obesity with body mass index 30 or greater     Currently on Saxenda but interested in transitioning soon to [facility]. She wishes to check the amount of Saxenda/refills that she has left and reach out with if she would like to switch to [facility] at this time. She will continue to strive for balanced diet and regular exercise. Follow-up in several months. Call with concerns in the interim. BMI Counseling: Body mass index is 36.36 kg/m². The BMI is above normal. Nutrition recommendations include decreasing overall calorie intake and 3-5 servings of fruits/vegetables daily. Exercise recommendations include moderate aerobic physical activity for 150 minutes/week and exercising 3-5 times per week. Pharmacotherapy was ordered for patient to aid in weight loss. Vitamin D deficiency     Currently on 2000 units daily. We will continue to monitor. RLS (restless legs syndrome)     Improved. She will use either Requip 0.25 mg or Tylenol as needed. Obstructive sleep apnea  Unable to tolerate CPAP use. She will discuss the possibility of an oral mandibular advancement device when she sees her dentist later this week.     Spider vein, symptomatic  Patient will be reaching out to vascular to schedule possible sclerotherapy injections. Vitamin D deficiency  Currently on 2000 units daily. We will continue to monitor. RLS (restless legs syndrome)  Improved. She will use either Requip 0.25 mg or Tylenol as needed. Obesity with body mass index 30 or greater  Currently on Saxenda but interested in transitioning soon to [facility]. She wishes to check the amount of Saxenda/refills that she has left and reach out with if she would like to switch to [facility] at this time. She will continue to strive for balanced diet and regular exercise. Follow-up in several months. Call with concerns in the interim. BMI Counseling: Body mass index is 36.36 kg/m². The BMI is above normal. Nutrition recommendations include decreasing overall calorie intake and 3-5 servings of fruits/vegetables daily. Exercise recommendations include moderate aerobic physical activity for 150 minutes/week and exercising 3-5 times per week. Pharmacotherapy was ordered for patient to aid in weight loss. Chief Complaint     Chief Complaint   Patient presents with   • Follow-up     3 month follow up       History of Present Illness   Jorje Medina is a 48y.o.-year-old female who presents for follow-up on chronic conditions. JIGAR - unable to use CPAP - has not looked into ILDA device yet but sees dentist this week    Spider veins - maybe sclero injections in the fall - needs to call to schedule    Vit D def - on 2000 units of vit D daily     Obesity - on Saxenda 3 mg daily - notes that she has just come back from vacation - notes that she had been a few pounds lower - notes that she bikes for exercise and did 2 big rides on vacation     RLS-on Requip 0.25 mg at bedtime - also feels better with Tylenol        Review of Systems   Review of Systems   Constitutional: Negative for chills and fever. Respiratory: Negative for shortness of breath. Cardiovascular: Negative for chest pain, palpitations and leg swelling.    Neurological: Negative for dizziness and headaches.        Active Problem List     Patient Active Problem List   Diagnosis   • Depression, major, recurrent ([address])   • Eczema   • Enlarged uterus   • Fatigue   • Hand dermatitis   • Irregular menses   • Obesity with body mass index 30 or greater   • Spider vein, symptomatic   • Spider veins of both lower extremities   • Vertigo   • Weight gain   • Arthralgia   • Obstructive sleep apnea   • Vitamin D deficiency   • Candidal intertrigo   • Venous insufficiency of both lower extremities   • Abnormal TSH   • Thyroid antibody positive   • Radiculopathy, cervical region   • Numbness and tingling in left hand   • RLS (restless legs syndrome)   • Rash   • Symptomatic varicose veins of both lower extremities         Past Medical History:  Past Medical History:   Diagnosis Date   • Depression    • Sleep apnea        Past Surgical History:  Past Surgical History:   Procedure Laterality Date   • MOUTH SURGERY     • ROOT CANAL      Apicoectomy; resolved; 07/26/17   • WISDOM TOOTH EXTRACTION      1980's       Family History:  Family History   Problem Relation Age of Onset   • Hypertension Mother    • Rheum arthritis Mother    • Diverticulosis Mother         plus diverticulitis   • Cataracts Mother    • Macular degeneration Mother    • Cirrhosis Father    • Leukemia Father 61        ALL   • No Known Problems Sister    • No Known Problems Daughter    • Diabetes Maternal Grandmother         mellitus   • Hypertension Maternal Grandmother         pulmonary   • Cancer Maternal Grandfather 48        face/throat   • Anxiety disorder Paternal Grandmother    • Depression Paternal Grandmother    • Dementia Paternal Grandmother    • Glaucoma Paternal Grandmother    • Hypertension Paternal Grandmother    • Lung cancer Paternal Grandfather 80   • Heart attack Paternal Grandfather        Social History:  Social History     Socioeconomic History   • Marital status: /Civil Union     Spouse name: Not on file   • Number of children: Not on file   • Years of education: college student   • Highest education level: Not on file   Occupational History   • Occupation: employed   Tobacco Use   • Smoking status: Never   • Smokeless tobacco: Never   Vaping Use   • Vaping Use: Never used   Substance and Sexual Activity   • Alcohol use: Yes     Comment: occasional/social - about 1-2 per week   • Drug use: Never   • Sexual activity: Yes     Partners: Male     Birth control/protection: Male Sterilization, None   Other Topics Concern   • Not on file   Social History Narrative    Daily coffee consumption 2 cups a day    Household: Mother     Social Determinants of Health     Financial Resource Strain: Not on file   Food Insecurity: Not on file   Transportation Needs: Not on file   Physical Activity: Not on file   Stress: Not on file   Social Connections: Not on file   Intimate Partner Violence: Not on file   Housing Stability: Not on file       Objective     Vitals:    08/21/23 1620   BP: 124/60   BP Location: Left arm   Patient Position: Sitting   Cuff Size: Large   Pulse: 79   Resp: 12   Temp: (!) 97.1 °F (36.2 °C)   TempSrc: Temporal   SpO2: 97%   Weight: 90.2 kg (198 lb 12.8 oz)   Height: 5' 2" (1.575 m)     Wt Readings from Last 3 Encounters:   08/21/23 90.2 kg (198 lb 12.8 oz)   07/05/23 90.3 kg (199 lb)   05/17/23 90.1 kg (198 lb 9.6 oz)       Physical Exam  Vitals reviewed. Constitutional:       General: She is not in acute distress. Appearance: Normal appearance. She is well-developed. She is obese. She is not ill-appearing. HENT:      Head: Normocephalic and atraumatic. Neck:      Thyroid: No thyromegaly. Cardiovascular:      Rate and Rhythm: Normal rate and regular rhythm. Pulses: Normal pulses. Heart sounds: Normal heart sounds. No murmur heard. Comments: No carotid bruits noted  Pulmonary:      Effort: Pulmonary effort is normal.      Breath sounds: Normal breath sounds. No wheezing, rhonchi or rales.    Musculoskeletal: Cervical back: Neck supple. Right lower leg: No edema. Left lower leg: No edema. Lymphadenopathy:      Cervical: No cervical adenopathy. Skin:     General: Skin is warm and dry. Neurological:      Mental Status: She is alert. Psychiatric:         Mood and Affect: Mood normal.         Behavior: Behavior normal.         Thought Content: Thought content normal.         Judgment: Judgment normal.         Pertinent Laboratory/Diagnostic Studies:  Lab Results   Component Value Date    BUN 14 12/23/2021    CREATININE 0.85 12/23/2021    CALCIUM 9.2 12/23/2021    K 4.0 12/23/2021    CO2 29 12/23/2021     12/23/2021     Lab Results   Component Value Date    ALT 18 12/23/2021    AST 11 12/23/2021    ALKPHOS 78 12/23/2021       Lab Results   Component Value Date    WBC 4.95 12/30/2022    HGB 13.5 12/30/2022    HCT 40.9 12/30/2022    MCV 91 12/30/2022     12/30/2022     Lab Results   Component Value Date    TRIG 100 06/29/2023     Lab Results   Component Value Date    HDL 56 06/29/2023     Lab Results   Component Value Date    LDLCALC 81 06/29/2023     Lab Results   Component Value Date    HGBA1C 4.9 06/29/2023       Results for orders placed or performed in visit on 06/29/23   Hemoglobin A1C   Result Value Ref Range    Hemoglobin A1C 4.9 Normal 3.8-5.6%; PreDiabetic 5.7-6.4%;  Diabetic >=6.5%; Glycemic control for adults with diabetes <7.0% %    EAG 94 mg/dl   Lipid panel   Result Value Ref Range    Cholesterol 157 See Comment mg/dL    Triglycerides 100 See Comment mg/dL    HDL, Direct 56 >=50 mg/dL    LDL Calculated 81 0 - 100 mg/dL    Non-HDL-Chol (CHOL-HDL) 101 mg/dl           ALLERGIES:  No Known Allergies    Current Medications     Current Outpatient Medications   Medication Sig Dispense Refill   • Cholecalciferol (Vitamin D) 50 MCG (2000 UT) tablet Take 1 tablet (2,000 Units total) by mouth daily 90 tablet 3   • clobetasol (TEMOVATE) 0.05 % cream Apply topically 2 (two) times a day 30 g 0   • diltiazem (CARDIZEM) 2% cream Apply topically daily 30 g 2   • Ibuprofen (ADVIL PO) Take by mouth as needed     • Insulin Pen Needle 32G X 4 MM MISC Use once daily with Saxenda. 100 each 3   • Multiple Vitamin (MULTIVITAMIN) tablet Take 1 tablet by mouth in the morning     • nystatin (MYCOSTATIN) powder Apply topically 3 (three) times a day as needed (candidiasis) 60 g 3   • psyllium (METAMUCIL) 58.6 % powder Take 1 packet by mouth 2 (two) times a day     • rOPINIRole (REQUIP) 0.25 mg tablet Take 1 tablet (0.25 mg total) by mouth daily at bedtime 30 tablet 0   • Semaglutide-Weight Management (WEGOVY) 2.4 MG/0.75ML Inject 0.75 mL (2.4 mg total) under the skin once a week 3 mL 2     No current facility-administered medications for this visit.          Health Maintenance     Health Maintenance   Topic Date Due   • PT PLAN OF CARE  03/23/2022   • Influenza Vaccine (1) 09/01/2023   • COVID-19 Vaccine (4 - Pfizer series) 11/21/2023 (Originally 12/1/2021)   • Breast Cancer Screening: Mammogram  12/22/2023   • Depression Remission PHQ  02/21/2024   • Annual Physical  05/17/2024   • BMI: Adult  08/21/2024   • BMI: Followup Plan  08/24/2024   • Cervical Cancer Screening  11/10/2024   • DTaP,Tdap,and Td Vaccines (3 - Td or Tdap) 10/09/2027   • Colorectal Cancer Screening  01/28/2031   • HIV Screening  Completed   • Hepatitis C Screening  Completed   • Pneumococcal Vaccine: Pediatrics (0 to 5 Years) and At-Risk Patients (6 to 59 Years)  Aged Out   • HIB Vaccine  Aged Out   • IPV Vaccine  Aged Out   • Hepatitis A Vaccine  Aged Out   • Meningococcal ACWY Vaccine  Aged Out   • HPV Vaccine  Aged [employer] History   Administered Date(s) Administered   • COVID-19 PFIZER VACCINE 0.3 ML IM 12/18/2020, 01/08/2021, 10/06/2021   • Hep B, adult 07/24/2013, 08/26/2013, 03/12/2014   • INFLUENZA 01/01/2007, 10/26/2020   • Influenza Quadrivalent 3 years and older 10/05/2018   • Influenza Quadrivalent Preservative Free 3 years and older IM 10/03/2017   • Influenza, seasonal, injectable 10/24/2009, 11/01/2015   • Meningococcal, Unknown Serogroups 03/19/2014   • Tdap 09/26/2007, 10/09/2017   • Tuberculin Skin Test-PPD Intradermal 07/29/2013, 08/12/2013, 03/21/2014   • Zoster Vaccine Recombinant 05/17/2023       Janet Khan PA-C  8/24/2023 3:18 PM  [facility]

## 2023-08-22 ENCOUNTER — TELEPHONE (OUTPATIENT)
Dept: VASCULAR SURGERY | Facility: CLINIC | Age: 53
End: 2023-08-22

## 2023-08-22 DIAGNOSIS — I83.893 SYMPTOMATIC VARICOSE VEINS OF BOTH LOWER EXTREMITIES: Primary | ICD-10-CM

## 2023-08-22 DIAGNOSIS — I87.2 VENOUS INSUFFICIENCY OF BOTH LOWER EXTREMITIES: ICD-10-CM

## 2023-08-22 NOTE — TELEPHONE ENCOUNTER
Pt would like a new stocking script mailed to her for panyhose and knee hi if possible injections 11/14/23      Thank You

## 2023-08-23 ENCOUNTER — PATIENT MESSAGE (OUTPATIENT)
Dept: FAMILY MEDICINE CLINIC | Facility: CLINIC | Age: 53
End: 2023-08-23

## 2023-08-23 DIAGNOSIS — E66.9 OBESITY WITH BODY MASS INDEX 30 OR GREATER: Primary | ICD-10-CM

## 2023-08-24 NOTE — ASSESSMENT & PLAN NOTE
Currently on Saxenda but interested in transitioning soon to [facility]. She wishes to check the amount of Saxenda/refills that she has left and reach out with if she would like to switch to [facility] at this time. She will continue to strive for balanced diet and regular exercise. Follow-up in several months. Call with concerns in the interim. BMI Counseling: Body mass index is 36.36 kg/m². The BMI is above normal. Nutrition recommendations include decreasing overall calorie intake and 3-5 servings of fruits/vegetables daily. Exercise recommendations include moderate aerobic physical activity for 150 minutes/week and exercising 3-5 times per week. Pharmacotherapy was ordered for patient to aid in weight loss.

## 2023-08-24 NOTE — ASSESSMENT & PLAN NOTE
Unable to tolerate CPAP use. She will discuss the possibility of an oral mandibular advancement device when she sees her dentist later this week.

## 2023-09-07 ENCOUNTER — TELEPHONE (OUTPATIENT)
Dept: FAMILY MEDICINE CLINIC | Facility: CLINIC | Age: 53
End: 2023-09-07

## 2023-09-07 NOTE — TELEPHONE ENCOUNTER
I called [employer] to start pt's prior auth for her cristina  Case number [id number] this can take about 4 days up to 15 days. Pt has been advised and given the case number through [employer]

## 2023-09-25 ENCOUNTER — OFFICE VISIT (OUTPATIENT)
Dept: FAMILY MEDICINE CLINIC | Facility: CLINIC | Age: 53
End: 2023-09-25
Payer: COMMERCIAL

## 2023-09-25 VITALS
WEIGHT: 200.2 LBS | HEART RATE: 92 BPM | OXYGEN SATURATION: 96 % | BODY MASS INDEX: 36.62 KG/M2 | DIASTOLIC BLOOD PRESSURE: 72 MMHG | SYSTOLIC BLOOD PRESSURE: 126 MMHG

## 2023-09-25 DIAGNOSIS — L30.9 DERMATITIS: Primary | ICD-10-CM

## 2023-09-25 PROCEDURE — 99213 OFFICE O/P EST LOW 20 MIN: CPT | Performed by: PHYSICIAN ASSISTANT

## 2023-09-25 RX ORDER — PREDNISONE 10 MG/1
TABLET ORAL
Qty: 42 TABLET | Refills: 0 | Status: SHIPPED | OUTPATIENT
Start: 2023-09-25

## 2023-09-25 NOTE — PROGRESS NOTES
FAMILY PRACTICE ACUTE OFFICE VISIT  [facility] - [facility]       NAME: Charlie Ardon  AGE: 48 y.o. SEX: female       : 1970        MRN: [id number]    DATE: 10/4/2023  TIME: 12:58 AM    Assessment and Plan     Problem List Items Addressed This Visit    None  Visit Diagnoses     Dermatitis    -  Primary    Possibly allergic. Uncertain trigger. Try prednisone taper. Continue hydrocortisone cream and PRN Benadryl. Consider Claritin in AM. Call if worsens or persists    Relevant Medications    predniSONE 10 mg tablet                Chief Complaint   No chief complaint on file. History of Present Illness   Charlie Ardon is a 48y.o.-year-old female who presents for a rash. Patient notes that she has a rash that began 1.5 weeks ago in between and under the breasts. She states they are circles and then it spread to her stomach. She was at the beach last week (rash had already started) and notes that her  thought she has 2 bug bites - whelts initially but then turned into the circles. She denies exposure to poison. She notes that there is a possibility of tick exposure. She does note that she was on doxy for 3 weeks for this in July (almost 3 months ago). The rash is itchy and spreading to legs and back. She has tried hydrocortisone cream but not helping much. She has to take Benadryl at bedtime because it is much itchier in the evening. She tried nystatin powder but not too helpful. She feels that she has improvements and reflares. Rash  This is a new problem. The current episode started 1 to 4 weeks ago. Pertinent negatives include no anorexia, congestion, diarrhea, fever, shortness of breath, sore throat or vomiting. Review of Systems   Review of Systems   Constitutional: Negative for fever. HENT: Negative for congestion and sore throat. Respiratory: Negative for shortness of breath.     Gastrointestinal: Negative for anorexia, diarrhea, nausea and vomiting. Musculoskeletal: Negative for arthralgias. Skin: Positive for rash. Neurological: Negative for dizziness and headaches. Active Problem List     Patient Active Problem List   Diagnosis   • Depression, major, recurrent (HCC)   • Eczema   • Enlarged uterus   • Fatigue   • Hand dermatitis   • Irregular menses   • Obesity with body mass index 30 or greater   • Spider vein, symptomatic   • Spider veins of both lower extremities   • Vertigo   • Weight gain   • Arthralgia   • Obstructive sleep apnea   • Vitamin D deficiency   • Candidal intertrigo   • Venous insufficiency of both lower extremities   • Abnormal TSH   • Thyroid antibody positive   • Radiculopathy, cervical region   • Numbness and tingling in left hand   • RLS (restless legs syndrome)   • Rash   • Symptomatic varicose veins of both lower extremities         Social History:  Social History     Socioeconomic History   • Marital status: /Civil Union     Spouse name: Not on file   • Number of children: Not on file   • Years of education: college student   • Highest education level: Not on file   Occupational History   • Occupation: employed   Tobacco Use   • Smoking status: Never   • Smokeless tobacco: Never   Vaping Use   • Vaping Use: Never used   Substance and Sexual Activity   • Alcohol use: Yes     Comment: occasional/social - about 1-2 per week   • Drug use: Never   • Sexual activity: Yes     Partners: Male     Birth control/protection: Male Sterilization, None   Other Topics Concern   • Not on file   Social History Narrative    Daily coffee consumption 2 cups a day    Household:  Mother     Social Determinants of Health     Financial Resource Strain: Not on file   Food Insecurity: Not on file   Transportation Needs: Not on file   Physical Activity: Not on file   Stress: Not on file   Social Connections: Not on file   Intimate Partner Violence: Not on file   Housing Stability: Not on file       Objective     Vitals: 09/25/23 0938   BP: 126/72   BP Location: Right arm   Patient Position: Sitting   Cuff Size: Large   Pulse: 92   SpO2: 96%   Weight: 90.8 kg (200 lb 3.2 oz)     Wt Readings from Last 3 Encounters:   09/25/23 90.8 kg (200 lb 3.2 oz)   08/21/23 90.2 kg (198 lb 12.8 oz)   07/05/23 90.3 kg (199 lb)       Physical Exam  Vitals reviewed. Constitutional:       General: She is not in acute distress. Appearance: Normal appearance. She is obese. She is not ill-appearing. Cardiovascular:      Rate and Rhythm: Normal rate and regular rhythm. Pulses: Normal pulses. Heart sounds: Normal heart sounds. No murmur heard. Pulmonary:      Effort: Pulmonary effort is normal.      Breath sounds: Normal breath sounds. No wheezing, rhonchi or rales. Skin:     General: Skin is warm and dry. Findings: Rash (Scattered erythematous patches on chest and torso without any noted vesicles, pustules, or crusting) present. Neurological:      Mental Status: She is alert. Psychiatric:         Mood and Affect: Mood normal.         Behavior: Behavior normal.         Thought Content: Thought content normal.         Judgment: Judgment normal.                        ALLERGIES:  No Known Allergies    Current Medications     Current Outpatient Medications   Medication Sig Dispense Refill   • Cholecalciferol (Vitamin D) 50 MCG (2000 UT) tablet Take 1 tablet (2,000 Units total) by mouth daily 90 tablet 3   • clobetasol (TEMOVATE) 0.05 % cream Apply topically 2 (two) times a day 30 g 0   • diltiazem (CARDIZEM) 2% cream Apply topically daily 30 g 2   • Ibuprofen (ADVIL PO) Take by mouth as needed     • Insulin Pen Needle 32G X 4 MM MISC Use once daily with Saxenda.  100 each 3   • Multiple Vitamin (MULTIVITAMIN) tablet Take 1 tablet by mouth in the morning     • nystatin (MYCOSTATIN) powder Apply topically 3 (three) times a day as needed (candidiasis) 60 g 3   • predniSONE 10 mg tablet Take 6 pills by mouth x 2 days, 5 pills QD x 2 days, 4 pills QD x 2 days, 3 pills QD x 2 days, 2 pills QD x 2 days, and 1 pill QD x 2 days. 42 tablet 0   • psyllium (METAMUCIL) 58.6 % powder Take 1 packet by mouth 2 (two) times a day     • rOPINIRole (REQUIP) 0.25 mg tablet Take 1 tablet (0.25 mg total) by mouth daily at bedtime 30 tablet 0   • Semaglutide-Weight Management (WEGOVY) 2.4 MG/0.75ML Inject 0.75 mL (2.4 mg total) under the skin once a week 3 mL 2     No current facility-administered medications for this visit.          Consuelo Padilla PA-C  10/4/2023 12:58 AM  [facility]

## 2023-10-30 ENCOUNTER — OFFICE VISIT (OUTPATIENT)
Dept: FAMILY MEDICINE CLINIC | Facility: CLINIC | Age: 53
End: 2023-10-30
Payer: COMMERCIAL

## 2023-10-30 VITALS
SYSTOLIC BLOOD PRESSURE: 110 MMHG | OXYGEN SATURATION: 98 % | HEIGHT: 62 IN | BODY MASS INDEX: 35.77 KG/M2 | RESPIRATION RATE: 14 BRPM | DIASTOLIC BLOOD PRESSURE: 58 MMHG | HEART RATE: 51 BPM | WEIGHT: 194.4 LBS | TEMPERATURE: 97.7 F

## 2023-10-30 DIAGNOSIS — R39.15 URINARY URGENCY: ICD-10-CM

## 2023-10-30 DIAGNOSIS — E66.9 OBESITY WITH BODY MASS INDEX 30 OR GREATER: Primary | ICD-10-CM

## 2023-10-30 LAB
SL AMB  POCT GLUCOSE, UA: NEGATIVE
SL AMB LEUKOCYTE ESTERASE,UA: NEGATIVE
SL AMB POCT BILIRUBIN,UA: NEGATIVE
SL AMB POCT BLOOD,UA: NEGATIVE
SL AMB POCT CLARITY,UA: CLEAR
SL AMB POCT COLOR,UA: YELLOW
SL AMB POCT KETONES,UA: NEGATIVE
SL AMB POCT NITRITE,UA: ABNORMAL
SL AMB POCT PH,UA: 5
SL AMB POCT SPECIFIC GRAVITY,UA: 1.02
SL AMB POCT URINE PROTEIN: ABNORMAL
SL AMB POCT UROBILINOGEN: 0.2

## 2023-10-30 PROCEDURE — 99213 OFFICE O/P EST LOW 20 MIN: CPT | Performed by: PHYSICIAN ASSISTANT

## 2023-10-30 PROCEDURE — 87086 URINE CULTURE/COLONY COUNT: CPT | Performed by: PHYSICIAN ASSISTANT

## 2023-10-30 PROCEDURE — 81002 URINALYSIS NONAUTO W/O SCOPE: CPT | Performed by: PHYSICIAN ASSISTANT

## 2023-10-30 RX ORDER — ONDANSETRON HYDROCHLORIDE 8 MG/1
8 TABLET, FILM COATED ORAL EVERY 8 HOURS PRN
Qty: 30 TABLET | Refills: 0 | Status: SHIPPED | OUTPATIENT
Start: 2023-10-30

## 2023-10-30 NOTE — ASSESSMENT & PLAN NOTE
Patient has been having difficulty tolerating Wegovy 2.4 mg weekly. We will decrease to 1.7 mg weekly. Reach out with any ongoing concerns. Given prescription for Zofran 8 mg to take as needed if having difficulty after taking Wegovy 1.7 mg (concerned as she is going on vacation). We will need to consider further decrease in dose if continues to have difficulty tolerating it.

## 2023-10-30 NOTE — PROGRESS NOTES
FAMILY PRACTICE ACUTE OFFICE VISIT  [facility] - [facility]       NAME: Nohemi Son  AGE: 48 y.o. SEX: female       : 1970        MRN: [id number]    DATE: 10/30/2023  TIME: 9:52 AM    Assessment and Plan     Problem List Items Addressed This Visit          Other    Obesity with body mass index 30 or greater - Primary     Patient has been having difficulty tolerating Wegovy 2.4 mg weekly. We will decrease to 1.7 mg weekly. Reach out with any ongoing concerns. Given prescription for Zofran 8 mg to take as needed if having difficulty after taking Wegovy 1.7 mg (concerned as she is going on vacation). We will need to consider further decrease in dose if continues to have difficulty tolerating it. Relevant Medications    Semaglutide-Weight Management (WEGOVY) 1.7 MG/0.75ML    ondansetron (ZOFRAN) 8 mg tablet     Other Visit Diagnoses       Urinary urgency        Reviewed normal urine dip. We will send urine for culture. Relevant Orders    POCT urine dip    Urine culture              Chief Complaint     Chief Complaint   Patient presents with    Medication Problem     Side effects       History of Present Illness   Nohemi Son is a 48y.o.-year-old female who presents for medication side effect.      Patient notes that she has had trouble since switched from [facility] to [facility] - notes that she had some constipation but a lot of nausea and burping/belching and experiencing large air bubble come up with rolling over in bed - taking Zofran and drinking ginger tea to help with the nausea - notes that she had to skip last week's dose due to her symptoms - had trouble to the point of not being able to drink coffee     Also notes that she has had slight urgency over the last week - not sure if she is having this from at times having to hold her urine with work - has twinges of pain too - concerned about UTI          Review of Systems   Review of Systems Gastrointestinal:  Positive for constipation and nausea. Burping/belching   Genitourinary:  Positive for dysuria and urgency. Active Problem List     Patient Active Problem List   Diagnosis    Depression, major, recurrent (HCC)    Eczema    Enlarged uterus    Fatigue    Hand dermatitis    Irregular menses    Obesity with body mass index 30 or greater    Spider vein, symptomatic    Spider veins of both lower extremities    Vertigo    Weight gain    Arthralgia    Obstructive sleep apnea    Vitamin D deficiency    Candidal intertrigo    Venous insufficiency of both lower extremities    Abnormal TSH    Thyroid antibody positive    Radiculopathy, cervical region    Numbness and tingling in left hand    RLS (restless legs syndrome)    Rash    Symptomatic varicose veins of both lower extremities         Social History:  Social History     Socioeconomic History    Marital status: /Civil Union     Spouse name: Not on file    Number of children: Not on file    Years of education: college student    Highest education level: Not on file   Occupational History    Occupation: employed   Tobacco Use    Smoking status: Never    Smokeless tobacco: Never   Vaping Use    Vaping Use: Never used   Substance and Sexual Activity    Alcohol use: Yes     Comment: occasional/social - about 1-2 per week    Drug use: Never    Sexual activity: Yes     Partners: Male     Birth control/protection: Male Sterilization, None   Other Topics Concern    Not on file   Social History Narrative    Daily coffee consumption 2 cups a day    Household:  Mother     Social Determinants of Health     Financial Resource Strain: Not on file   Food Insecurity: Not on file   Transportation Needs: Not on file   Physical Activity: Not on file   Stress: Not on file   Social Connections: Not on file   Intimate Partner Violence: Not on file   Housing Stability: Not on file       Objective     Vitals:    10/30/23 0859   BP: 110/58   BP Location: Left arm   Patient Position: Sitting   Cuff Size: Large   Pulse: (!) 51   Resp: 14   Temp: 97.7 °F (36.5 °C)   TempSrc: Temporal   SpO2: 98%   Weight: 88.2 kg (194 lb 6.4 oz)   Height: 5' 2" (1.575 m)     Wt Readings from Last 3 Encounters:   10/30/23 88.2 kg (194 lb 6.4 oz)   09/25/23 90.8 kg (200 lb 3.2 oz)   08/21/23 90.2 kg (198 lb 12.8 oz)       Physical Exam  Vitals reviewed. Constitutional:       General: She is not in acute distress. Appearance: Normal appearance. She is well-developed. She is obese. She is not ill-appearing. HENT:      Head: Normocephalic and atraumatic. Neck:      Thyroid: No thyromegaly. Cardiovascular:      Rate and Rhythm: Normal rate and regular rhythm. Pulses: Normal pulses. Heart sounds: Normal heart sounds. No murmur heard. Comments: No carotid bruits noted  Pulmonary:      Effort: Pulmonary effort is normal.      Breath sounds: Normal breath sounds. No wheezing, rhonchi or rales. Abdominal:      General: Bowel sounds are normal. There is no distension. Palpations: Abdomen is soft. There is no mass. Tenderness: There is no abdominal tenderness. There is right CVA tenderness and left CVA tenderness. There is no guarding. Musculoskeletal:      Cervical back: Neck supple. Lymphadenopathy:      Cervical: No cervical adenopathy. Neurological:      Mental Status: She is alert. Psychiatric:         Mood and Affect: Mood normal.         Behavior: Behavior normal.         Thought Content: Thought content normal.         Judgment: Judgment normal.         Pertinent Laboratory/Diagnostic Studies:  Results for orders placed or performed in visit on 06/29/23   Hemoglobin A1C   Result Value Ref Range    Hemoglobin A1C 4.9 Normal 3.8-5.6%; PreDiabetic 5.7-6.4%;  Diabetic >=6.5%; Glycemic control for adults with diabetes <7.0% %    EAG 94 mg/dl   Lipid panel   Result Value Ref Range    Cholesterol 157 See Comment mg/dL    Triglycerides 100 See Comment mg/dL HDL, Direct 56 >=50 mg/dL    LDL Calculated 81 0 - 100 mg/dL    Non-HDL-Chol (CHOL-HDL) 101 mg/dl       Orders Placed This Encounter   Procedures    Urine culture    POCT urine dip       ALLERGIES:  No Known Allergies    Current Medications     Current Outpatient Medications   Medication Sig Dispense Refill    Cholecalciferol (Vitamin D) 50 MCG (2000 UT) tablet Take 1 tablet (2,000 Units total) by mouth daily 90 tablet 3    clobetasol (TEMOVATE) 0.05 % cream Apply topically 2 (two) times a day 30 g 0    diltiazem (CARDIZEM) 2% cream Apply topically daily 30 g 2    Ibuprofen (ADVIL PO) Take by mouth as needed      Multiple Vitamin (MULTIVITAMIN) tablet Take 1 tablet by mouth in the morning      nystatin (MYCOSTATIN) powder Apply topically 3 (three) times a day as needed (candidiasis) 60 g 3    ondansetron (ZOFRAN) 8 mg tablet Take 1 tablet (8 mg total) by mouth every 8 (eight) hours as needed for nausea or vomiting 30 tablet 0    psyllium (METAMUCIL) 58.6 % powder Take 1 packet by mouth 2 (two) times a day      rOPINIRole (REQUIP) 0.25 mg tablet Take 1 tablet (0.25 mg total) by mouth daily at bedtime 30 tablet 0    Semaglutide-Weight Management (WEGOVY) 1.7 MG/0.75ML Inject 0.75 mL (1.7 mg total) under the skin once a week for 28 days 3 mL 0     No current facility-administered medications for this visit.          Daron Beasley PA-C  10/30/2023 9:52 AM  [facility]

## 2023-10-31 ENCOUNTER — APPOINTMENT (EMERGENCY)
Dept: CT IMAGING | Facility: HOSPITAL | Age: 53
End: 2023-10-31
Attending: EMERGENCY MEDICINE
Payer: COMMERCIAL

## 2023-10-31 ENCOUNTER — HOSPITAL ENCOUNTER (EMERGENCY)
Facility: HOSPITAL | Age: 53
Discharge: HOME | End: 2023-10-31
Attending: EMERGENCY MEDICINE
Payer: COMMERCIAL

## 2023-10-31 VITALS
SYSTOLIC BLOOD PRESSURE: 127 MMHG | DIASTOLIC BLOOD PRESSURE: 72 MMHG | HEART RATE: 91 BPM | TEMPERATURE: 98.5 F | BODY MASS INDEX: 36.45 KG/M2 | RESPIRATION RATE: 18 BRPM | WEIGHT: 199.3 LBS | OXYGEN SATURATION: 93 %

## 2023-10-31 DIAGNOSIS — N20.0 NEPHROLITHIASIS: Primary | ICD-10-CM

## 2023-10-31 DIAGNOSIS — N13.5 URETEROPELVIC JUNCTION (UPJ) OBSTRUCTION, LEFT: ICD-10-CM

## 2023-10-31 LAB
ALBUMIN SERPL BCP-MCNC: 4.3 G/DL (ref 3.5–5)
ALP SERPL-CCNC: 75 U/L (ref 34–104)
ALT SERPL W P-5'-P-CCNC: 11 U/L (ref 7–52)
AMORPH URATE CRY URNS QL MICRO: ABNORMAL
ANION GAP SERPL CALCULATED.3IONS-SCNC: 10 MMOL/L
AST SERPL W P-5'-P-CCNC: 13 U/L (ref 13–39)
BACTERIA UR CULT: NORMAL
BACTERIA UR QL AUTO: ABNORMAL /HPF
BASOPHILS # BLD AUTO: 0.03 THOUSANDS/ÂΜL (ref 0–0.1)
BASOPHILS NFR BLD AUTO: 1 % (ref 0–1)
BILIRUB SERPL-MCNC: 0.38 MG/DL (ref 0.2–1)
BILIRUB UR QL STRIP: NEGATIVE
BUN SERPL-MCNC: 13 MG/DL (ref 5–25)
CALCIUM SERPL-MCNC: 9.5 MG/DL (ref 8.4–10.2)
CAOX CRY URNS QL MICRO: ABNORMAL /HPF
CHLORIDE SERPL-SCNC: 105 MMOL/L (ref 96–108)
CLARITY UR: CLEAR
CO2 SERPL-SCNC: 25 MMOL/L (ref 21–32)
COLOR UR: ABNORMAL
CREAT SERPL-MCNC: 1.22 MG/DL (ref 0.6–1.3)
EOSINOPHIL # BLD AUTO: 0.09 THOUSAND/ÂΜL (ref 0–0.61)
EOSINOPHIL NFR BLD AUTO: 1 % (ref 0–6)
ERYTHROCYTE [DISTWIDTH] IN BLOOD BY AUTOMATED COUNT: 12.2 % (ref 11.6–15.1)
GFR SERPL CREATININE-BSD FRML MDRD: 50 ML/MIN/1.73SQ M
GLUCOSE SERPL-MCNC: 155 MG/DL (ref 65–140)
GLUCOSE UR STRIP-MCNC: NEGATIVE MG/DL
HCT VFR BLD AUTO: 43 % (ref 34.8–46.1)
HGB BLD-MCNC: 14.6 G/DL (ref 11.5–15.4)
HGB UR QL STRIP.AUTO: NEGATIVE
IMM GRANULOCYTES # BLD AUTO: 0.02 THOUSAND/UL (ref 0–0.2)
IMM GRANULOCYTES NFR BLD AUTO: 0 % (ref 0–2)
KETONES UR STRIP-MCNC: NEGATIVE MG/DL
LEUKOCYTE ESTERASE UR QL STRIP: NEGATIVE
LIPASE SERPL-CCNC: 50 U/L (ref 11–82)
LYMPHOCYTES # BLD AUTO: 1.41 THOUSANDS/ÂΜL (ref 0.6–4.47)
LYMPHOCYTES NFR BLD AUTO: 23 % (ref 14–44)
MCH RBC QN AUTO: 30.1 PG (ref 26.8–34.3)
MCHC RBC AUTO-ENTMCNC: 34 G/DL (ref 31.4–37.4)
MCV RBC AUTO: 89 FL (ref 82–98)
MONOCYTES # BLD AUTO: 0.28 THOUSAND/ÂΜL (ref 0.17–1.22)
MONOCYTES NFR BLD AUTO: 5 % (ref 4–12)
MUCOUS THREADS UR QL AUTO: ABNORMAL
NEUTROPHILS # BLD AUTO: 4.38 THOUSANDS/ÂΜL (ref 1.85–7.62)
NEUTS SEG NFR BLD AUTO: 70 % (ref 43–75)
NITRITE UR QL STRIP: NEGATIVE
NON-SQ EPI CELLS URNS QL MICRO: ABNORMAL /HPF
NRBC BLD AUTO-RTO: 0 /100 WBCS
PH UR STRIP.AUTO: 7.5 [PH]
PLATELET # BLD AUTO: 216 THOUSANDS/UL (ref 149–390)
PMV BLD AUTO: 9.8 FL (ref 8.9–12.7)
POTASSIUM SERPL-SCNC: 3.9 MMOL/L (ref 3.5–5.3)
PROT SERPL-MCNC: 6.9 G/DL (ref 6.4–8.4)
PROT UR STRIP-MCNC: ABNORMAL MG/DL
RBC # BLD AUTO: 4.85 MILLION/UL (ref 3.81–5.12)
RBC #/AREA URNS AUTO: ABNORMAL /HPF
SODIUM SERPL-SCNC: 140 MMOL/L (ref 135–147)
SP GR UR STRIP.AUTO: 1.02 (ref 1–1.03)
UROBILINOGEN UR STRIP-ACNC: <2 MG/DL
WBC # BLD AUTO: 6.21 THOUSAND/UL (ref 4.31–10.16)
WBC #/AREA URNS AUTO: ABNORMAL /HPF

## 2023-10-31 PROCEDURE — 74176 CT ABD & PELVIS W/O CONTRAST: CPT

## 2023-10-31 PROCEDURE — 85025 COMPLETE CBC W/AUTO DIFF WBC: CPT | Performed by: STUDENT IN AN ORGANIZED HEALTH CARE EDUCATION/TRAINING PROGRAM

## 2023-10-31 PROCEDURE — 80053 COMPREHEN METABOLIC PANEL: CPT | Performed by: STUDENT IN AN ORGANIZED HEALTH CARE EDUCATION/TRAINING PROGRAM

## 2023-10-31 PROCEDURE — 99284 EMERGENCY DEPT VISIT MOD MDM: CPT

## 2023-10-31 PROCEDURE — 96375 TX/PRO/DX INJ NEW DRUG ADDON: CPT

## 2023-10-31 PROCEDURE — 36415 COLL VENOUS BLD VENIPUNCTURE: CPT | Performed by: STUDENT IN AN ORGANIZED HEALTH CARE EDUCATION/TRAINING PROGRAM

## 2023-10-31 PROCEDURE — 81001 URINALYSIS AUTO W/SCOPE: CPT | Performed by: STUDENT IN AN ORGANIZED HEALTH CARE EDUCATION/TRAINING PROGRAM

## 2023-10-31 PROCEDURE — 83690 ASSAY OF LIPASE: CPT | Performed by: STUDENT IN AN ORGANIZED HEALTH CARE EDUCATION/TRAINING PROGRAM

## 2023-10-31 PROCEDURE — 99285 EMERGENCY DEPT VISIT HI MDM: CPT | Performed by: EMERGENCY MEDICINE

## 2023-10-31 PROCEDURE — 96374 THER/PROPH/DIAG INJ IV PUSH: CPT

## 2023-10-31 PROCEDURE — G1004 CDSM NDSC: HCPCS

## 2023-10-31 RX ORDER — OXYCODONE HYDROCHLORIDE AND ACETAMINOPHEN 5; 325 MG/1; MG/1
1 TABLET ORAL EVERY 4 HOURS PRN
Qty: 10 TABLET | Refills: 0 | Status: SHIPPED | OUTPATIENT
Start: 2023-10-31

## 2023-10-31 RX ORDER — FENTANYL CITRATE 50 UG/ML
100 INJECTION, SOLUTION INTRAMUSCULAR; INTRAVENOUS ONCE
Status: DISCONTINUED | OUTPATIENT
Start: 2023-10-31 | End: 2023-10-31 | Stop reason: HOSPADM

## 2023-10-31 RX ORDER — KETOROLAC TROMETHAMINE 30 MG/ML
15 INJECTION, SOLUTION INTRAMUSCULAR; INTRAVENOUS ONCE
Status: COMPLETED | OUTPATIENT
Start: 2023-10-31 | End: 2023-10-31

## 2023-10-31 RX ORDER — FENTANYL CITRATE 50 UG/ML
50 INJECTION, SOLUTION INTRAMUSCULAR; INTRAVENOUS ONCE
Status: COMPLETED | OUTPATIENT
Start: 2023-10-31 | End: 2023-10-31

## 2023-10-31 RX ADMIN — KETOROLAC TROMETHAMINE 15 MG: 30 INJECTION, SOLUTION INTRAMUSCULAR; INTRAVENOUS at 18:43

## 2023-10-31 RX ADMIN — FENTANYL CITRATE 50 MCG: 50 INJECTION INTRAMUSCULAR; INTRAVENOUS at 18:45

## 2023-10-31 NOTE — ED PROVIDER NOTES
History  Chief Complaint   Patient presents with    Back Pain     L back pain, started one hour ago, + urgency/frequency, not a lot of urination, + nausea     59-year-old female presents emergency department today for cute onset left flank pain that began just over one hour ago. She was at her primary care physician yesterday for evaluation of increased urinary frequency which has been going on for the past week. She says that her urine test was negative and she was sent home. Today while she was sitting at work she noted abrupt onset back pain which she said radiates into the left flank. No fever or chills but she does note vague abdominal pain and nausea. She denies blood in the urine or specific area of abdominal pain. She is unable to touch her left flank without severe pain. Denies rash over the area. She has not tried any for the pain. Prior to Admission Medications   Prescriptions Last Dose Informant Patient Reported? Taking?    Cholecalciferol (Vitamin D) 50 MCG (2000 UT) tablet  Self No No   Sig: Take 1 tablet (2,000 Units total) by mouth daily   Ibuprofen (ADVIL PO)  Self Yes No   Sig: Take by mouth as needed   Multiple Vitamin (MULTIVITAMIN) tablet  Self Yes No   Sig: Take 1 tablet by mouth in the morning   Semaglutide-Weight Management (WEGOVY) 1.7 MG/0.75ML   No No   Sig: Inject 0.75 mL (1.7 mg total) under the skin once a week for 28 days   clobetasol (TEMOVATE) 0.05 % cream  Self No No   Sig: Apply topically 2 (two) times a day   diltiazem (CARDIZEM) 2% cream  Self No No   Sig: Apply topically daily   nystatin (MYCOSTATIN) powder  Self No No   Sig: Apply topically 3 (three) times a day as needed (candidiasis)   ondansetron (ZOFRAN) 8 mg tablet   No No   Sig: Take 1 tablet (8 mg total) by mouth every 8 (eight) hours as needed for nausea or vomiting   psyllium (METAMUCIL) 58.6 % powder  Self Yes No   Sig: Take 1 packet by mouth 2 (two) times a day   rOPINIRole (REQUIP) 0.25 mg tablet  Self No No   Sig: Take 1 tablet (0.25 mg total) by mouth daily at bedtime      Facility-Administered Medications: None       Past Medical History:   Diagnosis Date    Depression     Sleep apnea        Past Surgical History:   Procedure Laterality Date    MOUTH SURGERY      ROOT CANAL      Apicoectomy; resolved; 07/26/17    WISDOM TOOTH EXTRACTION      18's       Family History   Problem Relation Age of Onset    Hypertension Mother     Rheum arthritis Mother     Diverticulosis Mother         plus diverticulitis    Cataracts Mother     Macular degeneration Mother     Cirrhosis Father     Leukemia Father 61        ALL    No Known Problems Sister     No Known Problems Daughter     Diabetes Maternal Grandmother         mellitus    Hypertension Maternal Grandmother         pulmonary    Cancer Maternal Grandfather 48        face/throat    Anxiety disorder Paternal Grandmother     Depression Paternal Grandmother     Dementia Paternal Grandmother     Glaucoma Paternal Grandmother     Hypertension Paternal Grandmother     Lung cancer Paternal Grandfather 80    Heart attack Paternal Grandfather      I have reviewed and agree with the history as documented. E-Cigarette/Vaping    E-Cigarette Use Never User      E-Cigarette/Vaping Substances    Nicotine No     THC No     CBD No     Flavoring No     Other No     Unknown No      Social History     Tobacco Use    Smoking status: Never    Smokeless tobacco: Never   Vaping Use    Vaping Use: Never used   Substance Use Topics    Alcohol use: Yes     Comment: occasional/social - about 1-2 per week    Drug use: Never        Review of Systems   Constitutional:  Positive for appetite change. Negative for chills, diaphoresis, fatigue and fever. Respiratory:  Negative for chest tightness and shortness of breath. Cardiovascular:  Negative for chest pain and palpitations. Gastrointestinal:  Positive for abdominal pain and nausea.  Negative for blood in stool, constipation, diarrhea and vomiting. Genitourinary:  Positive for decreased urine volume, flank pain, frequency and urgency. Negative for difficulty urinating, dysuria, pelvic pain and vaginal pain. Musculoskeletal:  Positive for back pain. Skin:  Negative for rash and wound. Neurological:  Negative for dizziness, tremors, seizures, speech difficulty, weakness and light-headedness. Psychiatric/Behavioral:  Negative for agitation and confusion. Physical Exam  ED Triage Vitals [10/31/23 1807]   Temperature Pulse Respirations Blood Pressure SpO2   98.5 °F (36.9 °C) 79 18 155/72 100 %      Temp src Heart Rate Source Patient Position - Orthostatic VS BP Location FiO2 (%)   -- Monitor Sitting Right arm --      Pain Score       8             Orthostatic Vital Signs  Vitals:    10/31/23 1807 10/31/23 2042   BP: 155/72 127/72   Pulse: 79 91   Patient Position - Orthostatic VS: Sitting Lying       Physical Exam  Constitutional:       General: She is in acute distress. Appearance: She is ill-appearing. HENT:      Head: Normocephalic and atraumatic. Nose: Nose normal.   Cardiovascular:      Rate and Rhythm: Normal rate and regular rhythm. Pulses: Normal pulses. Heart sounds: No murmur heard. Pulmonary:      Effort: Pulmonary effort is normal. No respiratory distress. Abdominal:      General: Abdomen is flat. There is no distension. Palpations: Abdomen is soft. Tenderness: There is abdominal tenderness. There is right CVA tenderness and left CVA tenderness. There is no guarding. Comments: Generalized mild abdominal pain. Severe flank pain, left greater than right. Musculoskeletal:      Cervical back: No rigidity. Skin:     General: Skin is warm and dry. Coloration: Skin is not jaundiced. Findings: No bruising, erythema, lesion or rash. Comments: No rash overlying flank. Neurological:      Mental Status: She is alert and oriented to person, place, and time.    Psychiatric: Comments:  In acute distress / pain         ED Medications  Medications   fentanyl citrate (PF) 100 MCG/2ML 100 mcg (100 mcg Intravenous Not Given 10/31/23 2125)   ketorolac (TORADOL) injection 15 mg (15 mg Intravenous Given 10/31/23 1843)   fentanyl citrate (PF) 100 MCG/2ML 50 mcg (50 mcg Intravenous Given 10/31/23 1845)       Diagnostic Studies  Results Reviewed       Procedure Component Value Units Date/Time    Urine Microscopic [[id number]]  (Abnormal) Collected: 10/31/23 1928    Lab Status: Final result Specimen: Urine, Clean Catch Updated: 10/31/23 1941     RBC, UA 4-10 /hpf      WBC, UA 2-4 /hpf      Epithelial Cells Occasional /hpf      Bacteria, UA Moderate /hpf      MUCUS THREADS Occasional     Amorphous Crystals, UA Occasional     Ca Oxalate Kellen, UA Occasional /hpf     UA w Reflex to Microscopic w Reflex to Culture [[id number]]  (Abnormal) Collected: 10/31/23 1928    Lab Status: Final result Specimen: Urine, Clean Catch Updated: 10/31/23 1936     Color, UA Light Yellow     Clarity, UA Clear     Specific Gravity, UA 1.023     pH, UA 7.5     Leukocytes, UA Negative     Nitrite, UA Negative     Protein, UA Trace mg/dl      Glucose, UA Negative mg/dl      Ketones, UA Negative mg/dl      Urobilinogen, UA <2.0 mg/dl      Bilirubin, UA Negative     Occult Blood, UA Negative    Lipase [[id number]]  (Normal) Collected: 10/31/23 1849    Lab Status: Final result Specimen: Blood from Arm, Right Updated: 10/31/23 1924     Lipase 50 u/L     Comprehensive metabolic panel [[id number]]  (Abnormal) Collected: 10/31/23 1849    Lab Status: Final result Specimen: Blood from Arm, Right Updated: 10/31/23 1924     Sodium 140 mmol/L      Potassium 3.9 mmol/L      Chloride 105 mmol/L      CO2 25 mmol/L      ANION GAP 10 mmol/L      BUN 13 mg/dL      Creatinine 1.22 mg/dL      Glucose 155 mg/dL      Calcium 9.5 mg/dL      AST 13 U/L      ALT 11 U/L      Alkaline Phosphatase 75 U/L      Total Protein 6.9 g/dL      Albumin 4.3 g/dL Total Bilirubin 0.38 mg/dL      eGFR 50 ml/min/1.73sq m     Narrative:      National Kidney Disease Foundation guidelines for Chronic Kidney Disease (CKD):     Stage 1 with normal or high GFR (GFR > 90 mL/min/1.73 square meters)    Stage 2 Mild CKD (GFR = 60-89 mL/min/1.73 square meters)    Stage 3A Moderate CKD (GFR = 45-59 mL/min/1.73 square meters)    Stage 3B Moderate CKD (GFR = 30-44 mL/min/1.73 square meters)    Stage 4 Severe CKD (GFR = 15-29 mL/min/1.73 square meters)    Stage 5 End Stage CKD (GFR <15 mL/min/1.73 square meters)  Note: GFR calculation is accurate only with a steady state creatinine    CBC and differential [[id number]] Collected: 10/31/23 1849    Lab Status: Final result Specimen: Blood from Arm, Right Updated: 10/31/23 1858     WBC 6.21 Thousand/uL      RBC 4.85 Million/uL      Hemoglobin 14.6 g/dL      Hematocrit 43.0 %      MCV 89 fL      MCH 30.1 pg      MCHC 34.0 g/dL      RDW 12.2 %      MPV 9.8 fL      Platelets 390 Thousands/uL      nRBC 0 /100 WBCs      Neutrophils Relative 70 %      Immat GRANS % 0 %      Lymphocytes Relative 23 %      Monocytes Relative 5 %      Eosinophils Relative 1 %      Basophils Relative 1 %      Neutrophils Absolute 4.38 Thousands/µL      Immature Grans Absolute 0.02 Thousand/uL      Lymphocytes Absolute 1.41 Thousands/µL      Monocytes Absolute 0.28 Thousand/µL      Eosinophils Absolute 0.09 Thousand/µL      Basophils Absolute 0.03 Thousands/µL                    CT renal stone study abdomen pelvis wo contrast   Final Result by Brandt Peabody, MD (10/31 2037)      Mild left hydroureteronephrosis resulting from a 3 mm left UV junction calculus. Nonobstructing right-sided intrarenal calculus measuring 3 mm.             Workstation performed: [id number]               Procedures  Procedures      ED Course                                       Medical Decision Making  49-year-old female presents emergency department today for cute onset left greater than right flank pain. Patient has had urinary urgency for over 1 week now with recent negative UA in her family doctor office yesterday. Suspicion for renal stone versus pyelonephritis is high in this patient. Appropriate labs including UA, lipase, CBC, CMP and CT scan with stone protocol have been ordered and are pending. Pain control medication is also been ordered. Patient in pain but hemodynamically stable upon initial presentation. Pending results of aforementioned tests. CT scan of the abdomen revealed 3 mm stone in inferior pole of right kidney as well as a 3 mm obstructing stone in the left UPJ. Stone in the left UPJ is the suspected etiology of her acute left-sided flank pain which has now resolved after some time in the emergency room and with pain medication. Anticipate spontaneous passage based on size and the movement that has occurred so far since arrival in the emergency room this afternoon. Discussed conservative care with the patient, and all questions were answered. She was discharged with urology follow-up and oral pain medication should it become severe again. We discussed symptoms over warrant her to return back to emergency room as well as follow-up afterward. She verbalized understanding and agrees with the plan of care. She improved during her time in the emergency room and was discharged home in stable condition with clear follow-up plan. Amount and/or Complexity of Data Reviewed  Labs: ordered. Radiology: ordered. Risk  Prescription drug management.           Disposition  Final diagnoses:   Nephrolithiasis   Ureteropelvic junction (UPJ) obstruction, left     Time reflects when diagnosis was documented in both MDM as applicable and the Disposition within this note       Time User Action Codes Description Comment    10/31/2023  9:00 PM Ed Lacona Add [N20.0] Nephrolithiasis     10/31/2023  9:00 PM Ed Lacona Add [N13.5] Ureteropelvic junction (UPJ) obstruction, left ED Disposition       ED Disposition   Discharge    Condition   Stable    Date/Time   Tue Oct 31, 2023  8:59 PM    Comment   Ann Webb discharge to home/self care. Follow-up Information       Follow up With Specialties Details Why Contact Info Additional [facility] Emergency Medicine Go to  If symptoms worsen [address] [id number]  [address] [facility], [address] [place] [address], [id number]            Patient's Medications   Discharge Prescriptions    OXYCODONE-ACETAMINOPHEN (PERCOCET) 5-325 MG PER TABLET    Take 1 tablet by mouth every 4 (four) hours as needed for moderate pain for up to 10 doses Max Daily Amount: 6 tablets       Start Date: 10/31/2023End Date: --       Order Dose: 1 tablet       Quantity: 10 tablet    Refills: 0     No discharge procedures on file. PDMP Review       None             ED Provider  Attending physically available and evaluated Ann Webb. I managed the patient along with the ED Attending.     Electronically Signed by           Oliva Gutierrez MD  10/31/23 1672

## 2023-11-07 ENCOUNTER — TELEPHONE (OUTPATIENT)
Dept: VASCULAR SURGERY | Facility: CLINIC | Age: 53
End: 2023-11-07

## 2023-12-19 DIAGNOSIS — Z00.6 ENCOUNTER FOR EXAMINATION FOR NORMAL COMPARISON OR CONTROL IN CLINICAL RESEARCH PROGRAM: ICD-10-CM

## 2024-01-02 ENCOUNTER — OFFICE VISIT (OUTPATIENT)
Dept: VASCULAR SURGERY | Facility: CLINIC | Age: 54
End: 2024-01-02
Payer: COMMERCIAL

## 2024-01-02 VITALS
BODY MASS INDEX: 35.88 KG/M2 | DIASTOLIC BLOOD PRESSURE: 84 MMHG | SYSTOLIC BLOOD PRESSURE: 124 MMHG | WEIGHT: 195 LBS | HEIGHT: 62 IN

## 2024-01-02 DIAGNOSIS — I87.2 VENOUS INSUFFICIENCY OF BOTH LOWER EXTREMITIES: Primary | ICD-10-CM

## 2024-01-02 DIAGNOSIS — I83.93 SPIDER VEINS OF BOTH LOWER EXTREMITIES: Chronic | ICD-10-CM

## 2024-01-02 PROCEDURE — 99213 OFFICE O/P EST LOW 20 MIN: CPT | Performed by: NURSE PRACTITIONER

## 2024-01-02 NOTE — PROGRESS NOTES
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  She returns the office in follow-up to discuss injection sclerotherapy    -Scattered superficial spider/reticular veins with cosmetic concerns.  On occasion has some itching at the right lateral calf and left lateral thigh reticular veins  -Primary area of concerns right anterior and lateral calf followed by left shin and left lateral calf  then left lateral thigh   -We discussed injection sclerotherapy for treatment of superficial telangiectasias including cost, need to wear compression stockings after injections, multiple sessions may be needed  -Rx 20-30 mmHg compression waist high given  -For scheduling: Asclera 0.5% solution, 10 mL, foam technique, 90 minutes, $500  -Photos taken   -Will plan excuse from work for 48 hours after injection     Venous insufficiency of both lower extremities  -BLE deep venous insufficiency  -No significant lower extremity swelling or venous stasis changes   -Rx 20-30mmHg compression to be worn daily and removed at night   -Periodic leg elevation   -Frequent ambulation   -Continued weight loss        Diagnoses and all orders for this visit:    Venous insufficiency of both lower extremities  -     Compression Stocking  -     Compression Stocking    Spider veins of both lower extremities  -     Compression Stocking  -     Compression Stocking          Subjective:      Patient ID: Kylah Batista is a 53 y.o. female.    Patient presents for evaluation of spider veins on BLE. She is interested in sclero injections. She wears compression and elevates her legs daily. Pt is not a smoker.     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  She returns the office in follow-up to discuss injection  "sclerotherapy  Patient previously evaluated by me November 2021 and Dr. Borden October 2021.  She was evaluated with a venous reflux study which showed deep and superficial venous insufficiency.  She was recommended for injection sclerotherapy which we planned for though she developed COVID and issue from office side she now returns to discuss injection sclerotherapy.  She has no lower extremity edema.  No significantly large bulky varicosities.  She is scattered bilateral lower extremity superficial spider and reticular veins.  She does complain of restless legs with long periods of sitting and was started on Requip by her PCP.  She is wearing 8 to 15 mmHg compression stockings daily. She lost 20+ lbs since last office visit.   The following portions of the patient's history were reviewed and updated as appropriate: allergies, current medications, past family history, past medical history, past social history, past surgical history, and problem list.  ROS reviewed     Review of Systems   Skin:         BLE spider veins   All other systems reviewed and are negative.        Objective:  I have reviewed and made appropriate changes to the review of systems input by the medical assistant.    Vitals:    01/02/24 1019   BP: 124/84   BP Location: Left arm   Patient Position: Sitting   Cuff Size: Standard   Weight: 88.5 kg (195 lb)   Height: 5' 2\" (1.575 m)       Patient Active Problem List   Diagnosis    Depression, major, recurrent (HCC)    Eczema    Enlarged uterus    Fatigue    Hand dermatitis    Irregular menses    Obesity with body mass index 30 or greater    Spider vein, symptomatic    Spider veins of both lower extremities    Vertigo    Weight gain    Arthralgia    Obstructive sleep apnea    Vitamin D deficiency    Candidal intertrigo    Venous insufficiency of both lower extremities    Abnormal TSH    Thyroid antibody positive    Radiculopathy, cervical region    Numbness and tingling in left hand    RLS (restless " legs syndrome)    Rash    Symptomatic varicose veins of both lower extremities       Past Surgical History:   Procedure Laterality Date    MOUTH SURGERY      ROOT CANAL      Apicoectomy; resolved; 07/26/17    WISDOM TOOTH EXTRACTION      1980's       Family History   Problem Relation Age of Onset    Hypertension Mother     Rheum arthritis Mother     Diverticulosis Mother         plus diverticulitis    Cataracts Mother     Macular degeneration Mother     Cirrhosis Father     Leukemia Father 63        ALL    No Known Problems Sister     No Known Problems Daughter     Diabetes Maternal Grandmother         mellitus    Hypertension Maternal Grandmother         pulmonary    Cancer Maternal Grandfather 50        face/throat    Anxiety disorder Paternal Grandmother     Depression Paternal Grandmother     Dementia Paternal Grandmother     Glaucoma Paternal Grandmother     Hypertension Paternal Grandmother     Lung cancer Paternal Grandfather 86    Heart attack Paternal Grandfather        Social History     Socioeconomic History    Marital status: /Civil Union     Spouse name: Not on file    Number of children: Not on file    Years of education: college student    Highest education level: Not on file   Occupational History    Occupation: employed   Tobacco Use    Smoking status: Never    Smokeless tobacco: Never   Vaping Use    Vaping status: Never Used   Substance and Sexual Activity    Alcohol use: Yes     Comment: occasional/social - about 1-2 per week    Drug use: Never    Sexual activity: Yes     Partners: Male     Birth control/protection: Male Sterilization, None   Other Topics Concern    Not on file   Social History Narrative    Daily coffee consumption 2 cups a day    Household: Mother     Social Determinants of Health     Financial Resource Strain: Not on file   Food Insecurity: Not on file   Transportation Needs: Not on file   Physical Activity: Not on file   Stress: Not on file   Social Connections: Not on  "file   Intimate Partner Violence: Not on file   Housing Stability: Not on file       No Known Allergies      Current Outpatient Medications:     Cholecalciferol (Vitamin D) 50 MCG (2000 UT) tablet, Take 1 tablet (2,000 Units total) by mouth daily, Disp: 90 tablet, Rfl: 3    clobetasol (TEMOVATE) 0.05 % cream, Apply topically 2 (two) times a day, Disp: 30 g, Rfl: 0    diltiazem (CARDIZEM) 2% cream, Apply topically daily, Disp: 30 g, Rfl: 2    Ibuprofen (ADVIL PO), Take by mouth as needed, Disp: , Rfl:     Multiple Vitamin (MULTIVITAMIN) tablet, Take 1 tablet by mouth in the morning, Disp: , Rfl:     nystatin (MYCOSTATIN) powder, Apply topically 3 (three) times a day as needed (candidiasis), Disp: 60 g, Rfl: 3    ondansetron (ZOFRAN) 8 mg tablet, Take 1 tablet (8 mg total) by mouth every 8 (eight) hours as needed for nausea or vomiting, Disp: 30 tablet, Rfl: 0    psyllium (METAMUCIL) 58.6 % powder, Take 1 packet by mouth 2 (two) times a day, Disp: , Rfl:     rOPINIRole (REQUIP) 0.25 mg tablet, Take 1 tablet (0.25 mg total) by mouth daily at bedtime, Disp: 30 tablet, Rfl: 0    oxyCODONE-acetaminophen (Percocet) 5-325 mg per tablet, Take 1 tablet by mouth every 4 (four) hours as needed for moderate pain for up to 10 doses Max Daily Amount: 6 tablets, Disp: 10 tablet, Rfl: 0      /84 (BP Location: Left arm, Patient Position: Sitting, Cuff Size: Standard)   Ht 5' 2\" (1.575 m)   Wt 88.5 kg (195 lb)   BMI 35.67 kg/m²          Physical Exam  Vitals and nursing note reviewed.   Constitutional:       Appearance: Normal appearance.   HENT:      Head: Normocephalic and atraumatic.   Eyes:      Extraocular Movements: Extraocular movements intact.   Cardiovascular:      Pulses:           Radial pulses are 2+ on the left side.        Dorsalis pedis pulses are 2+ on the right side and 2+ on the left side.        Posterior tibial pulses are 2+ on the right side and 2+ on the left side.      Heart sounds: Normal heart sounds. "   Pulmonary:      Effort: Pulmonary effort is normal.      Breath sounds: Normal breath sounds.   Musculoskeletal:         General: No swelling. Normal range of motion.   Skin:     General: Skin is warm.   Neurological:      General: No focal deficit present.      Mental Status: She is alert and oriented to person, place, and time.   Psychiatric:         Mood and Affect: Mood normal.         Behavior: Behavior normal.

## 2024-01-02 NOTE — ASSESSMENT & PLAN NOTE
-BLE deep venous insufficiency  -No significant lower extremity swelling or venous stasis changes   -Rx 20-30mmHg compression to be worn daily and removed at night   -Periodic leg elevation   -Frequent ambulation   -Continued weight loss

## 2024-01-02 NOTE — ASSESSMENT & PLAN NOTE
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  She returns the office in follow-up to discuss injection sclerotherapy    -Scattered superficial spider/reticular veins with cosmetic concerns.  On occasion has some itching at the right lateral calf and left lateral thigh reticular veins  -Primary area of concerns right anterior and lateral calf followed by left shin and left lateral calf  then left lateral thigh   -We discussed injection sclerotherapy for treatment of superficial telangiectasias including cost, need to wear compression stockings after injections, multiple sessions may be needed  -Rx 20-30 mmHg compression waist high given  -For scheduling: Asclera 0.5% solution, 10 mL, foam technique, 90 minutes, $500  -Photos taken   -Will plan excuse from work for 48 hours after injection

## 2024-01-17 DIAGNOSIS — E66.9 OBESITY WITH BODY MASS INDEX 30 OR GREATER: ICD-10-CM

## 2024-01-17 RX ORDER — ONDANSETRON HYDROCHLORIDE 8 MG/1
8 TABLET, FILM COATED ORAL EVERY 8 HOURS PRN
Qty: 21 TABLET | Refills: 2 | Status: SHIPPED | OUTPATIENT
Start: 2024-01-17

## 2024-01-18 ENCOUNTER — HOSPITAL ENCOUNTER (OUTPATIENT)
Dept: MAMMOGRAPHY | Facility: MEDICAL CENTER | Age: 54
Discharge: HOME | End: 2024-01-18
Payer: COMMERCIAL

## 2024-01-18 VITALS — HEIGHT: 62 IN | WEIGHT: 195.11 LBS | BODY MASS INDEX: 35.9 KG/M2

## 2024-01-18 DIAGNOSIS — Z12.31 ENCOUNTER FOR SCREENING MAMMOGRAM FOR MALIGNANT NEOPLASM OF BREAST: ICD-10-CM

## 2024-01-18 PROCEDURE — 77063 BREAST TOMOSYNTHESIS BI: CPT

## 2024-01-18 PROCEDURE — 77067 SCR MAMMO BI INCL CAD: CPT

## 2024-01-18 RX ORDER — SEMAGLUTIDE 1.7 MG/.75ML
INJECTION, SOLUTION SUBCUTANEOUS
Qty: 3 ML | Refills: 0 | Status: SHIPPED | OUTPATIENT
Start: 2024-01-18

## 2024-01-18 NOTE — TELEPHONE ENCOUNTER
Patient is due for follow-up.  She canceled her follow-up appointment last month.  Please call her to get an appointment rescheduled.

## 2024-01-18 NOTE — TELEPHONE ENCOUNTER
I called pt, she is scheduled for a follow up visit for Feb 7th at 2:40 PM. She is also due for her 2nd shingrix inj last one was in May. This note has also been placed in the appointment notes.     Thank you!

## 2024-01-23 ENCOUNTER — ANNUAL EXAM (OUTPATIENT)
Dept: OBGYN CLINIC | Facility: MEDICAL CENTER | Age: 54
End: 2024-01-23
Payer: COMMERCIAL

## 2024-01-23 VITALS
HEIGHT: 62 IN | SYSTOLIC BLOOD PRESSURE: 122 MMHG | DIASTOLIC BLOOD PRESSURE: 76 MMHG | WEIGHT: 196 LBS | BODY MASS INDEX: 36.07 KG/M2

## 2024-01-23 DIAGNOSIS — Z01.419 ENCOUNTER FOR WELL WOMAN EXAM WITH ROUTINE GYNECOLOGICAL EXAM: Primary | ICD-10-CM

## 2024-01-23 DIAGNOSIS — N95.1 MENOPAUSAL HOT FLUSHES: ICD-10-CM

## 2024-01-23 PROCEDURE — G0476 HPV COMBO ASSAY CA SCREEN: HCPCS | Performed by: OBSTETRICS & GYNECOLOGY

## 2024-01-23 PROCEDURE — G0145 SCR C/V CYTO,THINLAYER,RESCR: HCPCS | Performed by: OBSTETRICS & GYNECOLOGY

## 2024-01-23 PROCEDURE — S0612 ANNUAL GYNECOLOGICAL EXAMINA: HCPCS | Performed by: OBSTETRICS & GYNECOLOGY

## 2024-01-24 NOTE — PROGRESS NOTES
ASSESSMENT & PLAN: Kylah Batista is a 53 y.o.  with normal gynecologic exam.    1.  Routine well woman exam done today  2.  Pap and HPV:  The patient's last pap and hpv was .    It was normal.    Pap with cotesting was done today.    Current ASCCP Guidelines reviewed.   3.  Mammogram ordered  4.  Colorectal cancer screening was not ordered.  5. The following were reviewed in today's visit: breast self exam, mammography screening ordered, menopause, exercise, and healthy diet  6. Hot flushes  - we discussed HRT and other alternatives / [place]sd[place]  , states will try supplements and if no improvement let us know      CC:  Annual Gynecologic Examination    HPI: Kylah Batista is a 53 y.o.  who presents for annual gynecologic examination.  She has the following concerns:  LMP 12/10/2022 - one full year without menses , having hot flushes      Health Maintenance:    She wears her seatbelt routinely.    She does perform regular monthly self breast exams.    She feels safe at home.     Past Medical History:   Diagnosis Date    Depression     Sleep apnea        Past Surgical History:   Procedure Laterality Date    MOUTH SURGERY      ROOT CANAL      Apicoectomy; resolved; 17    WISDOM TOOTH EXTRACTION             Past OB/Gyn History:  OB History          3    Para   3    Term   3            AB        Living   3         SAB        IAB        Ectopic        Multiple        Live Births   3                   Family History   Problem Relation Age of Onset    Hypertension Mother     Rheum arthritis Mother     Diverticulosis Mother         plus diverticulitis    Cataracts Mother     Macular degeneration Mother     Cirrhosis Father     Leukemia Father 63        ALL    No Known Problems Sister     No Known Problems Daughter     Diabetes Maternal Grandmother         mellitus    Hypertension Maternal Grandmother         pulmonary    Cancer Maternal Grandfather 50        face/throat    Anxiety  disorder Paternal Grandmother     Depression Paternal Grandmother     Dementia Paternal Grandmother     Glaucoma Paternal Grandmother     Hypertension Paternal Grandmother     Lung cancer Paternal Grandfather 86    Heart attack Paternal Grandfather        Social History:  Social History     Socioeconomic History    Marital status: /Civil Union     Spouse name: Not on file    Number of children: Not on file    Years of education: college student    Highest education level: Not on file   Occupational History    Occupation: employed   Tobacco Use    Smoking status: Never    Smokeless tobacco: Never   Vaping Use    Vaping status: Never Used   Substance and Sexual Activity    Alcohol use: Yes     Comment: occasional/social - about 1-2 per week    Drug use: Never    Sexual activity: Yes     Partners: Male     Birth control/protection: Male Sterilization, None   Other Topics Concern    Not on file   Social History Narrative    Daily coffee consumption 2 cups a day    Household: Mother     Social Determinants of Health     Financial Resource Strain: Not on file   Food Insecurity: Not on file   Transportation Needs: Not on file   Physical Activity: Not on file   Stress: Not on file   Social Connections: Not on file   Intimate Partner Violence: Not on file   Housing Stability: Not on file       No Known Allergies    Current Outpatient Medications:     Cholecalciferol (Vitamin D) 50 MCG (2000 UT) tablet, Take 1 tablet (2,000 Units total) by mouth daily, Disp: 90 tablet, Rfl: 3    clobetasol (TEMOVATE) 0.05 % cream, Apply topically 2 (two) times a day, Disp: 30 g, Rfl: 0    diltiazem (CARDIZEM) 2% cream, Apply topically daily, Disp: 30 g, Rfl: 2    Ibuprofen (ADVIL PO), Take by mouth as needed, Disp: , Rfl:     Multiple Vitamin (MULTIVITAMIN) tablet, Take 1 tablet by mouth in the morning, Disp: , Rfl:     nystatin (MYCOSTATIN) powder, Apply topically 3 (three) times a day as needed (candidiasis), Disp: 60 g, Rfl: 3     "ondansetron (ZOFRAN) 8 mg tablet, Take 1 tablet (8 mg total) by mouth every 8 (eight) hours as needed for nausea or vomiting, Disp: 21 tablet, Rfl: 2    psyllium (METAMUCIL) 58.6 % powder, Take 1 packet by mouth 2 (two) times a day, Disp: , Rfl:     rOPINIRole (REQUIP) 0.25 mg tablet, Take 1 tablet (0.25 mg total) by mouth daily at bedtime, Disp: 30 tablet, Rfl: 0    Wegovy 1.7 MG/0.75ML, Inject 0.75 mL (1.7 mg total) under the skin once a week for 28 days, Disp: 3 mL, Rfl: 0    oxyCODONE-acetaminophen (Percocet) 5-325 mg per tablet, Take 1 tablet by mouth every 4 (four) hours as needed for moderate pain for up to 10 doses Max Daily Amount: 6 tablets, Disp: 10 tablet, Rfl: 0      Review of Systems  Constitutional :no fever, feels well, no tiredness, no recent weight gain or loss  ENT: no ear ache, no loss of hearing, no nosebleeds or nasal discharge, no sore throat or hoarseness.  Cardiovascular: no complaints of slow or fast heart beat, no chest pain, no palpitations, no leg claudication or lower extremity edema.  Respiratory: no complaints of shortness of shortness of breath, no BERNAL  Breasts:no complaints of breast pain, breast lump, or nipple discharge  Gastrointestinal: no complaints of abdominal pain, constipation, nausea, vomiting, or diarrhea or bloody stools  Genitourinary : no complaints of dysuria, incontinence, pelvic pain, no dysmenorrhea, vaginal discharge or abnormal vaginal bleeding and as noted in HPI.  Musculoskeletal: no complaints of arthralgia, no myalgia, no joint swelling or stiffness, no limb pain or swelling.  Integumentary: no complaints of skin rash or lesion, itching or dry skin  Neurological: no complaints of headache, no confusion, no numbness or tingling, no dizziness or fainting    Objective      /76   Ht 5' 2\" (1.575 m)   Wt 88.9 kg (196 lb)   LMP 12/10/2022 (Exact Date)   BMI 35.85 kg/m²     General:   appears stated age, cooperative, alert normal mood and affect   Lungs: " Unlabored breathing    Breasts: normal appearance, no masses or tenderness   Abdomen: soft, non-tender, without masses or organomegaly   Vulva: normal   Vagina: normal vagina, no discharge, exudate, lesion, or erythema   Urethra: normal   Cervix: Normal, no discharge. Nontender.   Uterus: normal size, contour, position, consistency, mobility, non-tender   Adnexa: no mass, fullness, tenderness   Psychiatric orientation to person, place, and time: normal. mood and affect: normal

## 2024-01-26 LAB
LAB AP GYN PRIMARY INTERPRETATION: NORMAL
Lab: NORMAL

## 2024-02-12 ENCOUNTER — TELEPHONE (OUTPATIENT)
Dept: VASCULAR SURGERY | Facility: CLINIC | Age: 54
End: 2024-02-12

## 2024-02-12 ENCOUNTER — OFFICE VISIT (OUTPATIENT)
Dept: FAMILY MEDICINE CLINIC | Facility: CLINIC | Age: 54
End: 2024-02-12
Payer: COMMERCIAL

## 2024-02-12 VITALS
DIASTOLIC BLOOD PRESSURE: 70 MMHG | HEART RATE: 90 BPM | BODY MASS INDEX: 36.07 KG/M2 | SYSTOLIC BLOOD PRESSURE: 122 MMHG | WEIGHT: 197.2 LBS | OXYGEN SATURATION: 97 %

## 2024-02-12 DIAGNOSIS — Z23 ENCOUNTER FOR IMMUNIZATION: ICD-10-CM

## 2024-02-12 DIAGNOSIS — E66.9 OBESITY WITH BODY MASS INDEX 30 OR GREATER: Primary | ICD-10-CM

## 2024-02-12 PROCEDURE — 90471 IMMUNIZATION ADMIN: CPT

## 2024-02-12 PROCEDURE — 90750 HZV VACC RECOMBINANT IM: CPT

## 2024-02-12 PROCEDURE — 99213 OFFICE O/P EST LOW 20 MIN: CPT | Performed by: PHYSICIAN ASSISTANT

## 2024-02-12 NOTE — ASSESSMENT & PLAN NOTE
Patient has had trouble tolerating Wegovy 1.7 mg even though removing it after only about 1/2 of the dose each week. She will decrease to 0.25 mg weekly and start with [employer]. She will work on increasing her trips to the gym per week. She will follow-up in about 10 weeks. We will then plan to determine whether she will continue with 0.25 mg or increase to 0.5 ng. We will likely not increase beyond that dose, though, due to expected intolerance. She was encouraged to call with any concerns prior to next visit.

## 2024-02-12 NOTE — PROGRESS NOTES
FAMILY PRACTICE OFFICE VISIT  [facility] - [facility]       NAME: Kylah Batista  AGE: 53 y.o. SEX: female       : 1970        MRN: [id number]    DATE: 2024  TIME: 6:58 PM    Assessment and Plan     Problem List Items Addressed This Visit          Other    Obesity with body mass index 30 or greater - Primary     Patient has had trouble tolerating Wegovy 1.7 mg even though removing it after only about 1/2 of the dose each week. She will decrease to 0.25 mg weekly and start with Weight [employer]. She will work on increasing her trips to the gym per week. She will follow-up in about 10 weeks. We will then plan to determine whether she will continue with 0.25 mg or increase to 0.5 ng. We will likely not increase beyond that dose, though, due to expected intolerance. She was encouraged to call with any concerns prior to next visit.          Relevant Medications    Semaglutide-Weight Management (WEGOVY) 0.25 MG/0.5ML     Other Visit Diagnoses       Encounter for immunization        Relevant Orders    Zoster Vaccine Recombinant IM (Completed)                Chief Complaint     Chief Complaint   Patient presents with    Follow-up       History of Present Illness   Kylah Batista is a 53 y.o.-year-old female who presents for follow-up on weight.     Recheck on obesity-was having difficulty with Wegovy 2.4 mg so decreased to 1.7 mg weekly at last visit and gave prescription for Zofran to use if having difficulty with nausea while on vacation-reports she has been still experiencing a lot of intermittent nausea despite the decrease in dose - notes that she normally vomits the first morning or two after each week's dose - notes that food just feels like it sits in her abdomen and is not digesting/causing her nausea.  Since her last visit about 3 months ago, she has gained about 3 pounds. She continues with constipation and notes having that even without the medication but  reports that Metamucil controls that. She experiences extra twinges/cramping if she has not had a BM in several days. She would like to decrease back to the starting dose to see how she tolerates it. She has been giving herself only half of the 1.7 mg pens. She will be starting Weight Watchers. She has just started going to the gym - going weekly but aiming for 3-4 times weekly.           Review of Systems   Review of Systems   Gastrointestinal:  Positive for constipation and nausea.       Active Problem List     Patient Active Problem List   Diagnosis    Depression, major, recurrent (HCC)    Eczema    Enlarged uterus    Fatigue    Hand dermatitis    Irregular menses    Obesity with body mass index 30 or greater    Spider vein, symptomatic    Spider veins of both lower extremities    Vertigo    Weight gain    Arthralgia    Obstructive sleep apnea    Vitamin D deficiency    Candidal intertrigo    Venous insufficiency of both lower extremities    Abnormal TSH    Thyroid antibody positive    Radiculopathy, cervical region    Numbness and tingling in left hand    RLS (restless legs syndrome)    Rash    Symptomatic varicose veins of both lower extremities         Past Medical History:  Past Medical History:   Diagnosis Date    Depression     Sleep apnea        Past Surgical History:  Past Surgical History:   Procedure Laterality Date    MOUTH SURGERY      ROOT CANAL      Apicoectomy; resolved; 07/26/17    WISDOM TOOTH EXTRACTION      1980's       Family History:  Family History   Problem Relation Age of Onset    Hypertension Mother     Rheum arthritis Mother     Diverticulosis Mother         plus diverticulitis    Cataracts Mother     Macular degeneration Mother     Cirrhosis Father     Leukemia Father 63        ALL    No Known Problems Sister     No Known Problems Daughter     Diabetes Maternal Grandmother         mellitus    Hypertension Maternal Grandmother         pulmonary    Cancer Maternal Grandfather 50         face/throat    Anxiety disorder Paternal Grandmother     Depression Paternal Grandmother     Dementia Paternal Grandmother     Glaucoma Paternal Grandmother     Hypertension Paternal Grandmother     Lung cancer Paternal Grandfather 86    Heart attack Paternal Grandfather        Social History:  Social History     Socioeconomic History    Marital status: /Civil Union     Spouse name: Not on file    Number of children: Not on file    Years of education: college student    Highest education level: Not on file   Occupational History    Occupation: employed   Tobacco Use    Smoking status: Never    Smokeless tobacco: Never   Vaping Use    Vaping status: Never Used   Substance and Sexual Activity    Alcohol use: Yes     Comment: occasional/social - about 1-2 per week    Drug use: Never    Sexual activity: Yes     Partners: Male     Birth control/protection: Male Sterilization, None   Other Topics Concern    Not on file   Social History Narrative    Daily coffee consumption 2 cups a day    Household: Mother     Social Determinants of Health     Financial Resource Strain: Not on file   Food Insecurity: Not on file   Transportation Needs: Not on file   Physical Activity: Not on file   Stress: Not on file   Social Connections: Not on file   Intimate Partner Violence: Not on file   Housing Stability: Not on file       Objective     Vitals:    02/12/24 0901   BP: 122/70   BP Location: Left arm   Patient Position: Sitting   Cuff Size: Large   Pulse: 90   SpO2: 97%   Weight: 89.4 kg (197 lb 3.2 oz)     Wt Readings from Last 3 Encounters:   02/12/24 89.4 kg (197 lb 3.2 oz)   01/23/24 88.9 kg (196 lb)   01/18/24 88.5 kg (195 lb 1.7 oz)       Physical Exam  Vitals reviewed.   Constitutional:       General: She is not in acute distress.     Appearance: Normal appearance. She is well-developed. She is not ill-appearing.   HENT:      Head: Normocephalic and atraumatic.   Neck:      Thyroid: No thyromegaly.   Cardiovascular:       Rate and Rhythm: Normal rate and regular rhythm.      Pulses: Normal pulses.      Heart sounds: Normal heart sounds. No murmur heard.     Comments: No carotid bruits noted  Pulmonary:      Effort: Pulmonary effort is normal.      Breath sounds: Normal breath sounds. No wheezing, rhonchi or rales.   Musculoskeletal:      Cervical back: Neck supple.      Right lower leg: No edema.      Left lower leg: No edema.   Lymphadenopathy:      Cervical: No cervical adenopathy.   Skin:     General: Skin is warm and dry.   Neurological:      Mental Status: She is alert.   Psychiatric:         Mood and Affect: Mood normal.         Behavior: Behavior normal.         Thought Content: Thought content normal.         Judgment: Judgment normal.         Pertinent Laboratory/Diagnostic Studies:  Lab Results   Component Value Date    BUN 13 10/31/2023    CREATININE 1.22 10/31/2023    CALCIUM 9.5 10/31/2023    K 3.9 10/31/2023    CO2 25 10/31/2023     10/31/2023     Lab Results   Component Value Date    ALT 11 10/31/2023    AST 13 10/31/2023    ALKPHOS 75 10/31/2023       Lab Results   Component Value Date    WBC 6.21 10/31/2023    HGB 14.6 10/31/2023    HCT 43.0 10/31/2023    MCV 89 10/31/2023     10/31/2023     Lab Results   Component Value Date    TRIG 100 06/29/2023     Lab Results   Component Value Date    HDL 56 06/29/2023     Lab Results   Component Value Date    LDLCALC 81 06/29/2023     Lab Results   Component Value Date    HGBA1C 4.9 06/29/2023       Results for orders placed or performed in visit on 01/23/24   HPV High Risk    Specimen: Thin-Prep Vial   Result Value Ref Range    HPV Other HR Negative Negative    HPV16 Negative Negative    HPV18 Negative Negative   Liquid-based pap, screening   Result Value Ref Range    Case Report       Gynecologic Cytology Report                       Case: [id number]                                  Authorizing Provider:  Amy Polanco MD  Collected:           01/23/2024  1447              Ordering Location:     [facility]  Received:            01/23/2024 1447                                     [facility]                                                           First Screen:          Mildred Hernandez, CT                                                         Specimen:    LIQUID-BASED PAP, SCREENING, Cervix, Endocervical                                          Primary Interpretation Negative for intraepithelial lesion or malignancy     Specimen Adequacy       Satisfactory for evaluation. Endocervical/transformation zone component present.    Additional Information       Robo[employer]'s FDA approved ,  and ThinPrep Imaging Duo System are utilized with strict adherence to the 's instruction manual to prepare gynecologic and non-gynecologic cytology specimens for the production of ThinPrep slides as well as for gynecologic ThinPrep imaging. These processes have been validated by our laboratory and/or by the .  The Pap test is not a diagnostic procedure and should not be used as the sole means to detect cervical cancer. It is only a screening procedure to aid in the detection of cervical cancer and its precursors. Both false-negative and false-positive results have been experienced. Your patient's test result should be interpreted in this context together with the history and clinical findings.             ALLERGIES:  No Known Allergies    Current Medications     Current Outpatient Medications   Medication Sig Dispense Refill    Semaglutide-Weight Management (WEGOVY) 0.25 MG/0.5ML Inject 0.5 mL (0.25 mg total) under the skin once a week 2 mL 2    Cholecalciferol (Vitamin D) 50 MCG (2000 UT) tablet Take 1 tablet (2,000 Units total) by mouth daily 90 tablet 3    clobetasol (TEMOVATE) 0.05 % cream Apply topically 2 (two) times a day 30 g 0    diltiazem (CARDIZEM) 2% cream Apply topically daily 30 g 2    Ibuprofen (ADVIL PO) Take by mouth  as needed      Multiple Vitamin (MULTIVITAMIN) tablet Take 1 tablet by mouth in the morning      nystatin (MYCOSTATIN) powder Apply topically 3 (three) times a day as needed (candidiasis) 60 g 3    ondansetron (ZOFRAN) 8 mg tablet Take 1 tablet (8 mg total) by mouth every 8 (eight) hours as needed for nausea or vomiting 21 tablet 2    oxyCODONE-acetaminophen (Percocet) 5-325 mg per tablet Take 1 tablet by mouth every 4 (four) hours as needed for moderate pain for up to 10 doses Max Daily Amount: 6 tablets 10 tablet 0    psyllium (METAMUCIL) 58.6 % powder Take 1 packet by mouth 2 (two) times a day      rOPINIRole (REQUIP) 0.25 mg tablet Take 1 tablet (0.25 mg total) by mouth daily at bedtime 30 tablet 0     No current facility-administered medications for this visit.         Health Maintenance     Health Maintenance   Topic Date Due    PT PLAN OF CARE  03/23/2022    Influenza Vaccine (1) 09/01/2023    COVID-19 Vaccine (4 - 2023-24 season) 09/01/2023    Annual Physical  05/17/2024    Depression Screening  08/21/2024    Breast Cancer Screening: Mammogram  01/18/2025    Cervical Cancer Screening  01/23/2027    DTaP,Tdap,and Td Vaccines (3 - Td or Tdap) 10/09/2027    Colorectal Cancer Screening  01/28/2031    HIV Screening  Completed    Hepatitis C Screening  Completed    Zoster Vaccine  Completed    Pneumococcal Vaccine: Pediatrics (0 to 5 Years) and At-Risk Patients (6 to 64 Years)  Aged Out    HIB Vaccine  Aged Out    IPV Vaccine  Aged Out    Hepatitis A Vaccine  Aged Out    Meningococcal ACWY Vaccine  Aged Out    HPV Vaccine  Aged Out     Immunization History   Administered Date(s) Administered    COVID-19 PFIZER VACCINE 0.3 ML IM 12/18/2020, 01/08/2021, 10/06/2021    Hep B, adult 07/24/2013, 08/26/2013, 03/12/2014    INFLUENZA 01/01/2007, 10/26/2020    Influenza Quadrivalent 3 years and older 10/05/2018    Influenza Quadrivalent Preservative Free 3 years and older IM 10/03/2017    Influenza, seasonal, injectable  10/24/2009, 11/01/2015    Meningococcal, Unknown Serogroups 03/19/2014    Tdap 09/26/2007, 10/09/2017    Tuberculin Skin Test-PPD Intradermal 07/29/2013, 08/12/2013, 03/21/2014    Zoster Vaccine Recombinant 05/17/2023, 02/12/2024       Olimpia Quiroga PA-C  2/12/2024 6:58 PM  [facility]

## 2024-03-26 ENCOUNTER — CLINICAL SUPPORT (OUTPATIENT)
Dept: VASCULAR SURGERY | Facility: CLINIC | Age: 54
End: 2024-03-26

## 2024-03-26 VITALS
OXYGEN SATURATION: 98 % | WEIGHT: 199 LBS | HEIGHT: 62 IN | DIASTOLIC BLOOD PRESSURE: 80 MMHG | HEART RATE: 97 BPM | BODY MASS INDEX: 36.62 KG/M2 | SYSTOLIC BLOOD PRESSURE: 118 MMHG

## 2024-03-26 DIAGNOSIS — I83.93 SPIDER VEINS OF BOTH LOWER EXTREMITIES: Primary | Chronic | ICD-10-CM

## 2024-03-26 DIAGNOSIS — E66.9 OBESITY WITH BODY MASS INDEX 30 OR GREATER: Primary | ICD-10-CM

## 2024-03-26 PROCEDURE — 36468 NJX SCLRSNT SPIDER VEINS: CPT | Performed by: NURSE PRACTITIONER

## 2024-03-26 RX ORDER — ACETAMINOPHEN 160 MG
TABLET,DISINTEGRATING ORAL
COMMUNITY
Start: 2024-01-17

## 2024-03-26 NOTE — ASSESSMENT & PLAN NOTE
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  On occasion has some itching at the right lateral calf and left lateral thigh reticular veins  -Full session of injection sclerotherapy to bilateral lower extremities with excellent response.  Compression dressing and compression stocking applied  -Compression stockings to be worn for the next 48 hours then can be worn daily for the next 4 weeks  -Follow-up in the office in 4 to 6 weeks to recheck injection sites

## 2024-03-26 NOTE — LETTER
March 26, 2024     Patient: Kylah Batista  YOB: 1970  Date of Visit: 3/26/2024      To Whom it May Concern:    Kylah Batista is under my professional care. Kylah was seen in my office on 3/26/2024. Kylah may return to work on 3/29/2204 .    If you have any questions or concerns, please don't hesitate to call.         Sincerely,          RADHA Santo        CC: No Recipients

## 2024-03-26 NOTE — PROGRESS NOTES
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  On occasion has some itching at the right lateral calf and left lateral thigh reticular veins  -Full session of injection sclerotherapy to bilateral lower extremities with excellent response.  Compression dressing and compression stocking applied  -Compression stockings to be worn for the next 48 hours then can be worn daily for the next 4 weeks  -Follow-up in the office in 4 to 6 weeks to recheck injection sites              Subjective:      Patient ID: Kylah Batista is a 53 y.o. female       Indications for Procedure: Patient presents with BLE dilated reticular veins and telangiectasia on the legs.     Objective      Exam: On bilateral lower extremities, dilated reticular veins and telangiectasias are present symmetrically without findings of chronic venous insufficiency.             Superficial prominent veins.  Patient advised or risks of pain upon injection, redness and swelling after treatment, bruising and bleeding, necrosis/ulceration, allergy, discoloration and the likelihood that multiple treatments may be necessary and clearance may not be complete.  Affected areas on the right lateral thigh, right lateral calf, right posterior calf, anterior shin on the right side, distal medial knee on the right side, right anterior thigh and left medial calf, left anterior shin, left lateral calf, left lateral thigh and left anterior thigh were treated with intravascular injections of 10 cc of  0.5% Asclera using a 32G syringe per ’s  protocol.    The patient tolerated the procedure well with minimal erythema and edema. Immediate pressure was applied with cotton balls secured with tape at the injection sites. Compression stockings of 15-20mm Hg were applied to the treated legs.  She will obtain 20 to 30 mmHg compression from [facility] [place] [place].  The patient was advised to wear compression stockings and dressing for the next 48 hours.  After 48 hours stockings and dressings can be removed.  Stockings can be worn during the day for the next 4 weeks. Follow up in 6 weeks

## 2024-04-10 ENCOUNTER — TELEPHONE (OUTPATIENT)
Dept: FAMILY MEDICINE CLINIC | Facility: CLINIC | Age: 54
End: 2024-04-10

## 2024-04-18 DIAGNOSIS — E66.9 OBESITY WITH BODY MASS INDEX 30 OR GREATER: Primary | ICD-10-CM

## 2024-04-22 ENCOUNTER — OFFICE VISIT (OUTPATIENT)
Dept: FAMILY MEDICINE CLINIC | Facility: CLINIC | Age: 54
End: 2024-04-22
Payer: COMMERCIAL

## 2024-04-22 ENCOUNTER — APPOINTMENT (OUTPATIENT)
Dept: LAB | Facility: MEDICAL CENTER | Age: 54
End: 2024-04-22

## 2024-04-22 VITALS
SYSTOLIC BLOOD PRESSURE: 110 MMHG | TEMPERATURE: 98.6 F | BODY MASS INDEX: 36.95 KG/M2 | OXYGEN SATURATION: 98 % | DIASTOLIC BLOOD PRESSURE: 78 MMHG | HEART RATE: 82 BPM | WEIGHT: 202 LBS

## 2024-04-22 DIAGNOSIS — G47.33 OBSTRUCTIVE SLEEP APNEA: ICD-10-CM

## 2024-04-22 DIAGNOSIS — I83.93 SPIDER VEINS OF BOTH LOWER EXTREMITIES: Primary | Chronic | ICD-10-CM

## 2024-04-22 DIAGNOSIS — G25.81 RLS (RESTLESS LEGS SYNDROME): ICD-10-CM

## 2024-04-22 DIAGNOSIS — E66.9 OBESITY WITH BODY MASS INDEX 30 OR GREATER: ICD-10-CM

## 2024-04-22 DIAGNOSIS — Z00.6 ENCOUNTER FOR EXAMINATION FOR NORMAL COMPARISON OR CONTROL IN CLINICAL RESEARCH PROGRAM: ICD-10-CM

## 2024-04-22 DIAGNOSIS — Z00.8 HEALTH EXAMINATION IN POPULATION SURVEY: ICD-10-CM

## 2024-04-22 LAB
EST. AVERAGE GLUCOSE BLD GHB EST-MCNC: 94 MG/DL
HBA1C MFR BLD: 4.9 %

## 2024-04-22 PROCEDURE — 36415 COLL VENOUS BLD VENIPUNCTURE: CPT

## 2024-04-22 PROCEDURE — 99214 OFFICE O/P EST MOD 30 MIN: CPT | Performed by: PHYSICIAN ASSISTANT

## 2024-04-22 PROCEDURE — 83036 HEMOGLOBIN GLYCOSYLATED A1C: CPT

## 2024-04-22 PROCEDURE — 80061 LIPID PANEL: CPT

## 2024-04-22 NOTE — PROGRESS NOTES
FAMILY PRACTICE OFFICE VISIT  [facility] - [facility] PRIMARY CARE       NAME: Kylah Batista  AGE: 53 y.o. 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  Reports that she has been wearing her compression stockings and still healing from the procedure.  She will follow-up as directed by vascular for reevaluation.            Respiratory    Obstructive sleep apnea     Unable to use CPAP.  Encouraged patient to continue to look into oral mandibular advancement device.            Neurology/Sleep    RLS (restless legs syndrome)     No longer on Requip.  Consider restarting Requip and titrating dose upward if needed.            Other    Obesity with body mass index 30 or greater     Patient to be starting Wegovy 1 mg weekly soon.  Continue to monitor for nausea.  Follow-up in 3 months.    BMI Counseling: Body mass index is 36.95 kg/m². The BMI is above normal. Nutrition recommendations include decreasing overall calorie intake and 3-5 servings of fruits/vegetables daily. Exercise recommendations include exercising 3-5 times per week. Pharmacotherapy was ordered for patient to aid in weight loss.              Spider veins of both lower extremities  Patient recently had sclerotherapy injections in March.  Reports that she has been wearing her compression stockings and still healing from the procedure.  She will follow-up as directed by vascular for reevaluation.    Obstructive sleep apnea  Unable to use CPAP.  Encouraged patient to continue to look into oral mandibular advancement device.    Obesity with body mass index 30 or greater  Patient to be starting Wegovy 1 mg weekly soon.  Continue to monitor for nausea.  Follow-up  in 3 months.    BMI Counseling: Body mass index is 36.95 kg/m². The BMI is above normal. Nutrition recommendations include decreasing overall calorie intake and 3-5 servings of fruits/vegetables daily. Exercise recommendations include exercising 3-5 times per week. Pharmacotherapy was ordered for patient to aid in weight loss.      RLS (restless legs syndrome)  No longer on Requip.  Consider restarting Requip and titrating dose upward if needed.            Chief Complaint     Chief Complaint   Patient presents with    Follow-up       History of Present Illness   Kylah Batista is a 53 y.o.-year-old female who presents for recheck on weight.     Recheck on obesity- on Wegovy 0.5 mg (retitrating due to nausea at lower doses) - will be starting 1 mg tomorrow - reports she has been having occasional intermittent nausea with the current dose - notes that she is able to eat more with these lower doses - no recent     JIGAR - unable to use CPAP - look into ILDA device yet but dentist does not make them    Spider veins - did sclero injections in 3/2024 - notes that she has been wearing the compression stockings and notes that she is still healing     Vit D def - not on 2000 units of vit D daily  - Last level was 17.9 in December 2022    RLS - no longer on Requip 0.25 mg at bedtime - notes that she feels it nightly and even wakes her up from sleeping           Review of Systems   Review of Systems   Constitutional:  Negative for chills and fever.   Respiratory:  Negative for shortness of breath.    Cardiovascular:  Negative for chest pain, palpitations and leg swelling.   Neurological:  Negative for dizziness and headaches.        Restless legs at night       Active Problem List     Patient Active Problem List   Diagnosis    Depression, major, recurrent (HCC)    Eczema    Enlarged uterus    Fatigue    Hand dermatitis    Irregular menses    Obesity with body mass index 30 or greater    Spider vein, symptomatic    Spider veins of  both lower extremities    Vertigo    Weight gain    Arthralgia    Obstructive sleep apnea    Vitamin D deficiency    Candidal intertrigo    Venous insufficiency of both lower extremities    Abnormal TSH    Thyroid antibody positive    Radiculopathy, cervical region    Numbness and tingling in left hand    RLS (restless legs syndrome)    Rash    Symptomatic varicose veins of both lower extremities         Past Medical History:  Past Medical History:   Diagnosis Date    Depression     Sleep apnea        Past Surgical History:  Past Surgical History:   Procedure Laterality Date    MOUTH SURGERY      ROOT CANAL      Apicoectomy; resolved; 07/26/17    WISDOM TOOTH EXTRACTION      1980's       Family History:  Family History   Problem Relation Age of Onset    Hypertension Mother     Rheum arthritis Mother     Diverticulosis Mother         plus diverticulitis    Cataracts Mother     Macular degeneration Mother     Cirrhosis Father     Leukemia Father 63        ALL    No Known Problems Sister     No Known Problems Daughter     Diabetes Maternal Grandmother         mellitus    Hypertension Maternal Grandmother         pulmonary    Cancer Maternal Grandfather 50        face/throat    Anxiety disorder Paternal Grandmother     Depression Paternal Grandmother     Dementia Paternal Grandmother     Glaucoma Paternal Grandmother     Hypertension Paternal Grandmother     Lung cancer Paternal Grandfather 86    Heart attack Paternal Grandfather        Social History:  Social History     Socioeconomic History    Marital status: /Civil Union     Spouse name: Not on file    Number of children: Not on file    Years of education: college student    Highest education level: Not on file   Occupational History    Occupation: employed   Tobacco Use    Smoking status: Never    Smokeless tobacco: Never   Vaping Use    Vaping status: Never Used   Substance and Sexual Activity    Alcohol use: Yes     Comment: occasional/social - about 1-2  per week    Drug use: Never    Sexual activity: Yes     Partners: Male     Birth control/protection: Male Sterilization, None   Other Topics Concern    Not on file   Social History Narrative    Daily coffee consumption 2 cups a day    Household: Mother     Social Determinants of Health     Financial Resource Strain: Not on file   Food Insecurity: Not on file   Transportation Needs: Not on file   Physical Activity: Not on file   Stress: Not on file   Social Connections: Not on file   Intimate Partner Violence: Not on file   Housing Stability: Not on file       Objective     Vitals:    04/22/24 1600   BP: 110/78   BP Location: Right arm   Cuff Size: Large   Pulse: 82   Temp: 98.6 °F (37 °C)   TempSrc: Tympanic   SpO2: 98%   Weight: 91.6 kg (202 lb)     Wt Readings from Last 3 Encounters:   04/22/24 91.6 kg (202 lb)   03/26/24 90.3 kg (199 lb)   02/12/24 89.4 kg (197 lb 3.2 oz)       Physical Exam  Vitals reviewed.   Constitutional:       General: She is not in acute distress.     Appearance: Normal appearance. She is well-developed. She is obese. She is not ill-appearing.   HENT:      Head: Normocephalic and atraumatic.   Neck:      Thyroid: No thyromegaly.      Vascular: No carotid bruit.   Cardiovascular:      Rate and Rhythm: Normal rate and regular rhythm.      Pulses: Normal pulses.      Heart sounds: Normal heart sounds. No murmur heard.  Pulmonary:      Effort: Pulmonary effort is normal.      Breath sounds: Normal breath sounds. No wheezing, rhonchi or rales.   Musculoskeletal:      Cervical back: Neck supple.      Right lower leg: No edema.      Left lower leg: No edema.   Lymphadenopathy:      Cervical: No cervical adenopathy.   Skin:     General: Skin is warm and dry.   Neurological:      Mental Status: She is alert.   Psychiatric:         Mood and Affect: Mood normal.         Behavior: Behavior normal.         Thought Content: Thought content normal.         Judgment: Judgment normal.         Pertinent  Laboratory/Diagnostic Studies:  Lab Results   Component Value Date    BUN 13 10/31/2023    CREATININE 1.22 10/31/2023    CALCIUM 9.5 10/31/2023    K 3.9 10/31/2023    CO2 25 10/31/2023     10/31/2023     Lab Results   Component Value Date    ALT 11 10/31/2023    AST 13 10/31/2023    ALKPHOS 75 10/31/2023       Lab Results   Component Value Date    WBC 6.21 10/31/2023    HGB 14.6 10/31/2023    HCT 43.0 10/31/2023    MCV 89 10/31/2023     10/31/2023     Lab Results   Component Value Date    TRIG 128 04/22/2024     Lab Results   Component Value Date    HDL 53 04/22/2024     Lab Results   Component Value Date    LDLCALC 75 04/22/2024     Lab Results   Component Value Date    HGBA1C 4.9 04/22/2024       Results for orders placed or performed in visit on 01/23/24   HPV High Risk    Specimen: Thin-Prep Vial   Result Value Ref Range    HPV Other HR Negative Negative    HPV16 Negative Negative    HPV18 Negative Negative   Liquid-based pap, screening   Result Value Ref Range    Case Report       Gynecologic Cytology Report                       Case: [id number]                                  Authorizing Provider:  Amy Polanco MD  Collected:           01/23/2024 1447              Ordering Location:     [facility]  Received:            01/23/2024 [address] [place] [address]                                                           First Screen:          Mildred Hernandez, CT                                                         Specimen:    LIQUID-BASED PAP, SCREENING, Cervix, Endocervical                                          Primary Interpretation Negative for intraepithelial lesion or malignancy     Specimen Adequacy       Satisfactory for evaluation. Endocervical/transformation zone component present.    Additional Information       [employer] FDA approved ,  and ThinPrep Imaging Duo System are utilized with strict adherence to the  's instruction manual to prepare gynecologic and non-gynecologic cytology specimens for the production of ThinPrep slides as well as for gynecologic ThinPrep imaging. These processes have been validated by our laboratory and/or by the .  The Pap test is not a diagnostic procedure and should not be used as the sole means to detect cervical cancer. It is only a screening procedure to aid in the detection of cervical cancer and its precursors. Both false-negative and false-positive results have been experienced. Your patient's test result should be interpreted in this context together with the history and clinical findings.           ALLERGIES:  No Known Allergies    Current Medications     Current Outpatient Medications   Medication Sig Dispense Refill    Cholecalciferol (Vitamin D) 50 MCG (2000 UT) tablet Take 1 tablet (2,000 Units total) by mouth daily 90 tablet 3    clobetasol (TEMOVATE) 0.05 % cream Apply topically 2 (two) times a day 30 g 0    Ibuprofen (ADVIL PO) Take by mouth as needed      Multiple Vitamin (MULTIVITAMIN) tablet Take 1 tablet by mouth in the morning      nystatin (MYCOSTATIN) powder Apply topically 3 (three) times a day as needed (candidiasis) 60 g 3    ondansetron (ZOFRAN) 8 mg tablet Take 1 tablet (8 mg total) by mouth every 8 (eight) hours as needed for nausea or vomiting 21 tablet 2    psyllium (METAMUCIL) 58.6 % powder Take 1 packet by mouth 2 (two) times a day      rOPINIRole (REQUIP) 0.25 mg tablet Take 1 tablet (0.25 mg total) by mouth daily at bedtime 30 tablet 0    Semaglutide-Weight Management (WEGOVY) 1 MG/0.5ML Inject 0.5 mL (1 mg total) under the skin once a week 2 mL 0    Vitamin D3 50 MCG (2000 UT) capsule        No current facility-administered medications for this visit.         Health Maintenance     Health Maintenance   Topic Date Due    PT PLAN OF CARE  03/23/2022    COVID-19 Vaccine (4 - 2023-24 season) 09/01/2023    Annual Physical  05/17/2024     Influenza Vaccine (Season Ended) 09/01/2024    Breast Cancer Screening: Mammogram  01/18/2025    Depression Screening  04/22/2025    Cervical Cancer Screening  01/23/2027    DTaP,Tdap,and Td Vaccines (3 - Td or Tdap) 10/09/2027    Colorectal Cancer Screening  01/28/2031    HIV Screening  Completed    Hepatitis C Screening  Completed    Zoster Vaccine  Completed    Pneumococcal Vaccine: Pediatrics (0 to 5 Years) and At-Risk Patients (6 to 64 Years)  Aged Out    HIB Vaccine  Aged Out    IPV Vaccine  Aged Out    Hepatitis A Vaccine  Aged Out    Meningococcal ACWY Vaccine  Aged Out    HPV Vaccine  Aged Out     Immunization History   Administered Date(s) Administered    COVID-19 PFIZER VACCINE 0.3 ML IM 12/18/2020, 01/08/2021, 10/06/2021    Hep B, adult 07/24/2013, 08/26/2013, 03/12/2014    INFLUENZA 01/01/2007, 10/26/2020    Influenza Quadrivalent 3 years and older 10/05/2018    Influenza Quadrivalent Preservative Free 3 years and older IM 10/03/2017    Influenza, seasonal, injectable 10/24/2009, 11/01/2015    Meningococcal, Unknown Serogroups 03/19/2014    Tdap 09/26/2007, 10/09/2017    Tuberculin Skin Test-PPD Intradermal 07/29/2013, 08/12/2013, 03/21/2014    Zoster Vaccine Recombinant 05/17/2023, 02/12/2024       Olimpia Quiroga PA-C  5/1/2024 1:45 AM  [facility]

## 2024-04-23 LAB
CHOLEST SERPL-MCNC: 154 MG/DL
HDLC SERPL-MCNC: 53 MG/DL
LDLC SERPL CALC-MCNC: 75 MG/DL (ref 0–100)
NONHDLC SERPL-MCNC: 101 MG/DL
TRIGL SERPL-MCNC: 128 MG/DL

## 2024-05-01 DIAGNOSIS — G25.81 RLS (RESTLESS LEGS SYNDROME): ICD-10-CM

## 2024-05-01 NOTE — ASSESSMENT & PLAN NOTE
Patient recently had sclerotherapy injections in March.  Reports that she has been wearing her compression stockings and still healing from the procedure.  She will follow-up as directed by vascular for reevaluation.

## 2024-05-01 NOTE — ASSESSMENT & PLAN NOTE
Unable to use CPAP.  Encouraged patient to continue to look into oral mandibular advancement device.

## 2024-05-01 NOTE — ASSESSMENT & PLAN NOTE
Patient to be starting Wegovy 1 mg weekly soon.  Continue to monitor for nausea.  Follow-up in 3 months.    BMI Counseling: Body mass index is 36.95 kg/m². The BMI is above normal. Nutrition recommendations include decreasing overall calorie intake and 3-5 servings of fruits/vegetables daily. Exercise recommendations include exercising 3-5 times per week. Pharmacotherapy was ordered for patient to aid in weight loss.

## 2024-05-02 RX ORDER — ROPINIROLE 0.25 MG/1
0.25 TABLET, FILM COATED ORAL
Qty: 30 TABLET | Refills: 5 | Status: SHIPPED | OUTPATIENT
Start: 2024-05-02

## 2024-05-07 ENCOUNTER — OFFICE VISIT (OUTPATIENT)
Dept: VASCULAR SURGERY | Facility: CLINIC | Age: 54
End: 2024-05-07

## 2024-05-07 VITALS
HEIGHT: 62 IN | BODY MASS INDEX: 36.25 KG/M2 | HEART RATE: 87 BPM | OXYGEN SATURATION: 97 % | SYSTOLIC BLOOD PRESSURE: 96 MMHG | WEIGHT: 197 LBS | DIASTOLIC BLOOD PRESSURE: 74 MMHG

## 2024-05-07 DIAGNOSIS — I83.93 SPIDER VEINS OF BOTH LOWER EXTREMITIES: Primary | Chronic | ICD-10-CM

## 2024-05-07 PROCEDURE — 99024 POSTOP FOLLOW-UP VISIT: CPT | Performed by: NURSE PRACTITIONER

## 2024-05-07 NOTE — PROGRESS NOTES
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 Returns to the off to follow up and recheck injection sites     -Reviewed pre injection photos  -Significant improvement in appearance of BLE veins following sclerotherapy   -Anticipate additional resolution over the next few weeks   -Cautioned with sun exposure   -Recommended follow up in 6 month to recheck legs and determine if additional injections needed       There are no diagnoses linked to this encounter.      Subjective:      Patient ID: Kylah Batista is a 53 y.o. female.    Pt presents for 6 week follow up s/p asclera injections 03/12/2024 MAYNOR LE. Pt states itching has subsided except for one spot LLE. Pt has transitioned from thigh high compressions to knee high compressions as of last week. Pt is not on pert meds and is a non smoker.     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 Returns to the off to follow up and recheck injection sites.  Itching associated with veins is improved post sclerotherapy.  She has a significant improvements in appearance of veins following sclerotherapy.  Excellent response.  No hyperpigmentation.  Anticipate additional resolution. Reviewed photos prior to sclerotherapy  The following portions of the patient's history were reviewed and updated as appropriate: allergies, current medications, past family history, past medical history, past social history, past surgical history, and problem list.  ROS reviewed     Review of Systems   Constitutional: Negative.    HENT: Negative.     Eyes: Negative.    Respiratory: Negative.     Cardiovascular: Negative.    Gastrointestinal: Negative.    Endocrine: Negative.   "  Genitourinary: Negative.    Musculoskeletal: Negative.    Skin: Negative.    Allergic/Immunologic: Negative.    Neurological: Negative.    Hematological: Negative.    Psychiatric/Behavioral: Negative.           Objective:    I have reviewed and made appropriate changes to the review of systems input by the medical assistant.    Vitals:    05/07/24 1536   BP: 96/74   BP Location: Left arm   Patient Position: Sitting   Cuff Size: Large   Pulse: 87   SpO2: 97%   Weight: 89.4 kg (197 lb)   Height: 5' 2\" (1.575 m)       Patient Active Problem List   Diagnosis    Depression, major, recurrent (HCC)    Eczema    Enlarged uterus    Fatigue    Hand dermatitis    Irregular menses    Obesity with body mass index 30 or greater    Spider vein, symptomatic    Spider veins of both lower extremities    Vertigo    Weight gain    Arthralgia    Obstructive sleep apnea    Vitamin D deficiency    Candidal intertrigo    Venous insufficiency of both lower extremities    Abnormal TSH    Thyroid antibody positive    Radiculopathy, cervical region    Numbness and tingling in left hand    RLS (restless legs syndrome)    Rash    Symptomatic varicose veins of both lower extremities       Past Surgical History:   Procedure Laterality Date    MOUTH SURGERY      ROOT CANAL      Apicoectomy; resolved; 07/26/17    WISDOM TOOTH EXTRACTION      1980's       Family History   Problem Relation Age of Onset    Hypertension Mother     Rheum arthritis Mother     Diverticulosis Mother         plus diverticulitis    Cataracts Mother     Macular degeneration Mother     Cirrhosis Father     Leukemia Father 63        ALL    No Known Problems Sister     No Known Problems Daughter     Diabetes Maternal Grandmother         mellitus    Hypertension Maternal Grandmother         pulmonary    Cancer Maternal Grandfather 50        face/throat    Anxiety disorder Paternal Grandmother     Depression Paternal Grandmother     Dementia Paternal Grandmother     Glaucoma " Paternal Grandmother     Hypertension Paternal Grandmother     Lung cancer Paternal Grandfather 86    Heart attack Paternal Grandfather        Social History     Socioeconomic History    Marital status: /Civil Union     Spouse name: Not on file    Number of children: Not on file    Years of education: college student    Highest education level: Not on file   Occupational History    Occupation: employed   Tobacco Use    Smoking status: Never    Smokeless tobacco: Never   Vaping Use    Vaping status: Never Used   Substance and Sexual Activity    Alcohol use: Yes     Comment: occasional/social - about 1-2 per week    Drug use: Never    Sexual activity: Yes     Partners: Male     Birth control/protection: Male Sterilization, None   Other Topics Concern    Not on file   Social History Narrative    Daily coffee consumption 2 cups a day    Household: Mother     Social Determinants of Health     Financial Resource Strain: Not on file   Food Insecurity: Not on file   Transportation Needs: Not on file   Physical Activity: Not on file   Stress: Not on file   Social Connections: Not on file   Intimate Partner Violence: Not on file   Housing Stability: Not on file       No Known Allergies      Current Outpatient Medications:     Cholecalciferol (Vitamin D) 50 MCG (2000 UT) tablet, Take 1 tablet (2,000 Units total) by mouth daily, Disp: 90 tablet, Rfl: 3    clobetasol (TEMOVATE) 0.05 % cream, Apply topically 2 (two) times a day, Disp: 30 g, Rfl: 0    Ibuprofen (ADVIL PO), Take by mouth as needed, Disp: , Rfl:     Multiple Vitamin (MULTIVITAMIN) tablet, Take 1 tablet by mouth in the morning, Disp: , Rfl:     nystatin (MYCOSTATIN) powder, Apply topically 3 (three) times a day as needed (candidiasis), Disp: 60 g, Rfl: 3    ondansetron (ZOFRAN) 8 mg tablet, Take 1 tablet (8 mg total) by mouth every 8 (eight) hours as needed for nausea or vomiting, Disp: 21 tablet, Rfl: 2    psyllium (METAMUCIL) 58.6 % powder, Take 1 packet by  "mouth 2 (two) times a day, Disp: , Rfl:     rOPINIRole (REQUIP) 0.25 mg tablet, TAKE ONE TABLET BY MOUTH EVERY DAY AT BEDTIME, Disp: 30 tablet, Rfl: 5    Semaglutide-Weight Management (WEGOVY) 1 MG/0.5ML, Inject 0.5 mL (1 mg total) under the skin once a week, Disp: 2 mL, Rfl: 0    Vitamin D3 50 MCG (2000 UT) capsule, , Disp: , Rfl:     BP 96/74 (BP Location: Left arm, Patient Position: Sitting, Cuff Size: Large)   Pulse 87   Ht 5' 2\" (1.575 m)   Wt 89.4 kg (197 lb)   LMP 12/10/2022 (Exact Date)   SpO2 97%   BMI 36.03 kg/m²          Physical Exam      "

## 2024-05-07 NOTE — ASSESSMENT & PLAN NOTE
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 Returns to the off to follow up and recheck injection sites     -Reviewed pre injection photos  -Significant improvement in appearance of BLE veins following sclerotherapy   -Anticipate additional resolution over the next few weeks   -Cautioned with sun exposure   -Recommended follow up in 6 month to recheck legs and determine if additional injections needed

## 2024-05-22 LAB
APOB+LDLR+PCSK9 GENE MUT ANL BLD/T: NOT DETECTED
BRCA1+BRCA2 DEL+DUP + FULL MUT ANL BLD/T: NOT DETECTED
MLH1+MSH2+MSH6+PMS2 GN DEL+DUP+FUL M: NOT DETECTED

## 2024-06-03 DIAGNOSIS — E66.9 OBESITY WITH BODY MASS INDEX 30 OR GREATER: ICD-10-CM

## 2024-06-04 RX ORDER — SEMAGLUTIDE 1 MG/.5ML
INJECTION, SOLUTION SUBCUTANEOUS
Qty: 2 ML | Refills: 1 | Status: SHIPPED | OUTPATIENT
Start: 2024-06-04

## 2024-07-08 NOTE — PATIENT INSTRUCTIONS
Patient's mother Lorraine called for report on patient's progress. Let mom know patient was seen in treatment team and that patient is doing well, reports he feels the medication has helped him \"concentrate and relax.\" Mom reports she has seen the patient smile so she knows he is doing better. She reports she keeps him accountable for taking his medications and monitors him daily. She states she believes he is ready to go. The patient reports he feels ready to discharge and denies any suicidal or homicidal thoughts.    She states she will be able to pick the patient up and he will go back to living with her. She denies access to guns or knives in the home.    Varicose Veins   WHAT YOU NEED TO KNOW:   What are varicose veins? Varicose veins are veins that become large, twisted, and swollen  They are common on the back of the calves, knees, and thighs  Varicose veins are caused by valves in your veins that do not work properly  This causes blood to collect and increase pressure in the veins of your legs  The increased pressure causes your veins to stretch, get larger, swell, and twist        What increases my risk for varicose veins? · Pregnancy    · A family history of varicose veins    · Being overweight or obese    · Age 48 years or older    · Sitting or standing for long periods of time    · Wearing tight clothing  What are the signs and symptoms of varicose veins? Your symptoms may be worse after you stand or sit for long periods of time  You may have any of the following:  · Blue, purple, or bulging veins in your legs     · Pain, swelling, or muscle cramps in your legs    · Feeling of fatigue or heaviness in your legs  How are varicose veins diagnosed? Your healthcare provider will examine your legs and ask about your medical history  You may need tests, such as a Doppler ultrasound or duplex scan  These tests show your veins and valves, and how your blood is flowing through them  These tests may also show if there is a blockage or blood clot  How are varicose veins treated? The goal of treatment is to decrease symptoms, improve appearance, and prevent further problems  Treatment will depend on which veins are affected and how severe your condition is  You may need procedures to treat or remove your varicose veins  For example, your healthcare provider may inject a solution or use a laser to close the varicose veins  Surgery to remove long veins may also be done  Ask your healthcare provider for more information about procedures used to treat varicose veins  What can I do to manage my symptoms? · Do not sit or stand for long periods of time    This can cause the blood to collect in your legs and make your symptoms worse  Bend or rotate your ankles several times every hour  Walk around for a few minutes every hour to get blood moving in your legs  · Do not cross your legs when you sit  This decreases blood flow to your feet and can make your symptoms worse  · Do not wear tight clothing or shoes  Do not wear high-heeled shoes  Do not wear clothes that are tight around the waist or knees  · Maintain a healthy weight  Being overweight or obese can make your varicose veins worse  Ask your healthcare provider how much you should weigh  Ask him or her to help you create a weight loss plan if you are overweight  · Wear pressure stockings as directed  The stockings are tight and put pressure on your legs  They improve blood flow and help prevent clots  · Elevate your legs  Keep them above the level of your heart for 15 to 30 minutes several times a day  You can also prop the end of your bed up slightly to elevate your legs while you sleep  This will help blood to flow back to your heart  · Get regular exercise  Talk to your healthcare provider about the best exercise plan for you  Exercise can improve blood flow to your legs and feet  When should I seek immediate care? · You have a wound that does not heal or is infected  · You have an injury that has broken your skin and caused your varicose veins to bleed  · Your leg is swollen and hard  · You notice that your legs or feet are turning blue or black  · Your leg feels warm, tender, and painful  It may look swollen and red  When should I contact my healthcare provider? · You have pain in your leg that does not go away or gets worse  · You notice sudden large bruising on your legs  · You have a rash on your leg  · Your symptoms keep you from doing your daily activities  · You have questions or concerns about your condition or care    CARE AGREEMENT:   You have the right to help plan your care  Learn about your health condition and how it may be treated  Discuss treatment options with your caregivers to decide what care you want to receive  You always have the right to refuse treatment  The above information is an  only  It is not intended as medical advice for individual conditions or treatments  Talk to your doctor, nurse or pharmacist before following any medical regimen to see if it is safe and effective for you  © 2017 [employer]  Information is for End User's use only and may not be sold, redistributed or otherwise used for commercial purposes  All illustrations and images included in CareNotes® are the copyrighted property of [employer]  or [employer]      -continue conservative measures to include daily use of prescription compression stockings, leg elevation, low-salt diet, aerobic activity, weight management and lotion to leg to help promote good skin health  -place your compression stockings on in the morning and remove prior to bed  -we will schedule you for a lower extremity venous reflux study to assess the function of your valves in your veins to help guide treatment options  -return to office with surgeon after reflux study for re-evaluation   -please contact the office in the interim with any questions, concerns or new symptoms

## 2024-08-10 DIAGNOSIS — E66.9 OBESITY WITH BODY MASS INDEX 30 OR GREATER: ICD-10-CM

## 2024-08-13 RX ORDER — SEMAGLUTIDE 1.7 MG/.75ML
INJECTION, SOLUTION SUBCUTANEOUS
Qty: 3 ML | Refills: 0 | Status: SHIPPED | OUTPATIENT
Start: 2024-08-13

## 2024-08-26 ENCOUNTER — OFFICE VISIT (OUTPATIENT)
Dept: FAMILY MEDICINE CLINIC | Facility: CLINIC | Age: 54
End: 2024-08-26
Payer: COMMERCIAL

## 2024-08-26 VITALS
OXYGEN SATURATION: 99 % | HEART RATE: 83 BPM | WEIGHT: 192.2 LBS | DIASTOLIC BLOOD PRESSURE: 84 MMHG | SYSTOLIC BLOOD PRESSURE: 120 MMHG | RESPIRATION RATE: 16 BRPM | HEIGHT: 62 IN | BODY MASS INDEX: 35.37 KG/M2

## 2024-08-26 DIAGNOSIS — E66.9 OBESITY WITH BODY MASS INDEX 30 OR GREATER: ICD-10-CM

## 2024-08-26 DIAGNOSIS — Z12.31 ENCOUNTER FOR SCREENING MAMMOGRAM FOR BREAST CANCER: ICD-10-CM

## 2024-08-26 DIAGNOSIS — I87.2 VENOUS INSUFFICIENCY OF BOTH LOWER EXTREMITIES: ICD-10-CM

## 2024-08-26 DIAGNOSIS — G25.81 RLS (RESTLESS LEGS SYNDROME): ICD-10-CM

## 2024-08-26 DIAGNOSIS — G47.33 OBSTRUCTIVE SLEEP APNEA: ICD-10-CM

## 2024-08-26 DIAGNOSIS — Z00.00 ANNUAL PHYSICAL EXAM: Primary | ICD-10-CM

## 2024-08-26 DIAGNOSIS — R79.89 ABNORMAL TSH: ICD-10-CM

## 2024-08-26 DIAGNOSIS — E55.9 VITAMIN D DEFICIENCY: ICD-10-CM

## 2024-08-26 PROCEDURE — 99396 PREV VISIT EST AGE 40-64: CPT | Performed by: PHYSICIAN ASSISTANT

## 2024-08-26 PROCEDURE — 99214 OFFICE O/P EST MOD 30 MIN: CPT | Performed by: PHYSICIAN ASSISTANT

## 2024-08-26 RX ORDER — ROPINIROLE 0.5 MG/1
0.5 TABLET, FILM COATED ORAL
Qty: 30 TABLET | Refills: 3 | Status: SHIPPED | OUTPATIENT
Start: 2024-08-26

## 2024-08-26 RX ORDER — SEMAGLUTIDE 1.7 MG/.75ML
1.7 INJECTION, SOLUTION SUBCUTANEOUS WEEKLY
Qty: 3 ML | Refills: 3 | Status: SHIPPED | OUTPATIENT
Start: 2024-08-26 | End: 2024-09-12 | Stop reason: DRUGHIGH

## 2024-08-26 NOTE — PROGRESS NOTES
Adult Annual Physical  Name: Kylah Batista      : 1970      MRN: [id number]  Encounter Provider: Olimpia Quiroga PA-C  Encounter Date: 2024   Encounter department: [facility]    Assessment & Plan   1. Annual physical exam  2. Obesity with body mass index 30 or greater  Assessment & Plan:  Patient will continue Wegovy 1.7 mg weekly.  She appears to be at her threshold currently of what she can tolerate with regards to dosage.  Patient will continue Metamucil daily for constipation.  She will continue eating a balanced diet and strive for regular exercise.  Patient was encouraged to reach out with any concerns prior to next visit in 3 months.  Orders:  -     Semaglutide-Weight Management (Wegovy) 1.7 MG/0.75ML; Inject 0.75 mL (1.7 mg total) under the skin once a week  -     CBC and differential; Future  -     Comprehensive metabolic panel; Future  3. Encounter for screening mammogram for breast cancer  -     Mammo screening bilateral w 3d & cad; Future; Expected date: 2025  4. RLS (restless legs syndrome)  Assessment & Plan:  Improved with increase in Requip.  Will continue 0.5 mg at bedtime.  Orders:  -     rOPINIRole (REQUIP) 0.5 mg tablet; Take 1 tablet (0.5 mg total) by mouth daily at bedtime  5. Obstructive sleep apnea  Assessment & Plan:  Unable to use CPAP.  Reports needing an updated sleep study for dentist who might be able to make an oral mandibular advancement device for her.  States that he recommended a home study for 3 nights.  This duration is not an option to my knowledge.  Will place order for home study.  Orders:  -     Ambulatory Referral to Sleep Medicine; Future  6. Vitamin D deficiency  Assessment & Plan:  Recheck level as ordered.  Orders:  -     Comprehensive metabolic panel; Future  -     Vitamin D 25 hydroxy; Future  7. Abnormal TSH  Assessment & Plan:  Recheck with labs as ordered.  Orders:  -     CBC and differential; Future  -     TSH, 3rd  generation with Free T4 reflex; Future  8. Venous insufficiency of both lower extremities  Assessment & Plan:  Doing well overall since sclerotherapy injections in March 2024.  She will continue wearing compression stockings.  Given prescription so that she can continue to update every 3 months as covered by her insurance.  Orders:  -     Compression Stocking    Immunizations and preventive care screenings were discussed with patient today. Appropriate education was printed on patient's after visit summary.    Counseling:  Exercise: the importance of regular exercise/physical activity was discussed. Recommend exercise 3-5 times per week for at least 30 minutes.          History of Present Illness     Adult Annual Physical:  Patient presents for annual physical.       Recheck on obesity- on Wegovy 1.7 mg (retitrating due to nausea at lower doses) - reports she has been tolerating it but gets bouts of nausea and has constipation but better if takes Metamucil daily - does sense some indigestion and sense that food is sitting in stomach - has lost about 5 pounds (believes that it has been with the newest dose)     JIGAR - unable to use CPAP     Spider veins - did sclero injections in 3/2024 - wearing compression stockings - notes that she can get another 3 pairs every 3 months     Vit D def - not on 2000 units of vit D daily - Last level was 17.9 in December 2022    RLS - on Requip 0.5 mg at bedtime and that works well         .     Diet and Physical Activity:  - Diet/Nutrition: well balanced diet and consuming 3-5 servings of fruits/vegetables daily.  - Exercise: no formal exercise. sporadically walking dog    Depression Screening:    - PHQ-9 Score: 0    General Health:  - Sleep: unrefreshing sleep. averages 7 hours a night  - Hearing: normal hearing bilateral ears.  - Vision: goes for regular eye exams and wears glasses.  - Dental: regular dental visits.    /GYN Health:  - Follows with GYN: yes.   - Menopause:  "postmenopausal.   - Contraception: menopause. 12/10/2022      Advanced Care Planning:  - Has an advanced directive?: yes      Review of Systems   Constitutional:  Negative for chills and fever.   HENT:  Negative for congestion, rhinorrhea and sore throat.    Respiratory:  Negative for cough, shortness of breath and wheezing.    Cardiovascular:  Positive for leg swelling (slight in feet - controlled with compression stockings). Negative for chest pain and palpitations.   Gastrointestinal:  Positive for nausea. Negative for abdominal pain, constipation, diarrhea and vomiting.   Genitourinary:  Negative for dysuria and frequency.   Musculoskeletal:  Positive for back pain (LBP and in upper back/shoulder - chronic). Negative for arthralgias and myalgias.   Skin:  Negative for rash.        Eczema and possibly on the posterior scalp too   Neurological:  Positive for dizziness (gets vertigo episodes) and light-headedness (with position changes). Negative for syncope and headaches.   Hematological:  Does not bruise/bleed easily.   Psychiatric/Behavioral:  Negative for dysphoric mood. The patient is not nervous/anxious.          Objective     /84 (BP Location: Left arm, Patient Position: Sitting, Cuff Size: Large)   Pulse 83   Resp 16   Ht 5' 2\" (1.575 m)   Wt 87.2 kg (192 lb 3.2 oz)   LMP 12/10/2022 (Exact Date)   SpO2 99%   BMI 35.15 kg/m²     Physical Exam  Vitals reviewed.   Constitutional:       General: She is not in acute distress.     Appearance: Normal appearance. She is well-developed. She is obese. She is not ill-appearing.   HENT:      Head: Normocephalic and atraumatic.      Right Ear: Tympanic membrane, ear canal and external ear normal.      Left Ear: Tympanic membrane, ear canal and external ear normal.      Nose: Nose normal. No congestion.      Mouth/Throat:      Mouth: Mucous membranes are moist.      Pharynx: No oropharyngeal exudate.   Eyes:      Conjunctiva/sclera: Conjunctivae normal.      " Pupils: Pupils are equal, round, and reactive to light.   Neck:      Thyroid: No thyromegaly.      Vascular: No carotid bruit.   Cardiovascular:      Rate and Rhythm: Normal rate and regular rhythm.      Pulses: Normal pulses.      Heart sounds: Normal heart sounds. No murmur heard.  Pulmonary:      Effort: Pulmonary effort is normal.      Breath sounds: Normal breath sounds. No wheezing, rhonchi or rales.   Abdominal:      General: Bowel sounds are normal. There is no distension.      Palpations: Abdomen is soft. There is no mass.      Tenderness: There is no abdominal tenderness.   Musculoskeletal:      Cervical back: Normal range of motion and neck supple.      Right lower leg: No edema.      Left lower leg: No edema.   Lymphadenopathy:      Cervical: No cervical adenopathy.   Skin:     General: Skin is warm and dry.      Findings: No rash.   Neurological:      Mental Status: She is alert.      Sensory: No sensory deficit.      Motor: No weakness.      Comments: 5/5 strength in UE and LE   Psychiatric:         Mood and Affect: Mood normal.         Behavior: Behavior normal.         Thought Content: Thought content normal.         Judgment: Judgment normal.

## 2024-08-26 NOTE — PATIENT INSTRUCTIONS
"Patient Education     Routine physical for adults   The Basics   Written by the doctors and editors at [facility]   What is a physical? -- A physical is a routine visit, or \"check-up,\" with your doctor. You might also hear it called a \"wellness visit\" or \"preventive visit.\"  During each visit, the doctor will:   Ask about your physical and mental health   Ask about your habits, behaviors, and lifestyle   Do an exam   Give you vaccines if needed   Talk to you about any medicines you take   Give advice about your health   Answer your questions  Getting regular check-ups is an important part of taking care of your health. It can help your doctor find and treat any problems you have. But it's also important for preventing health problems.  A routine physical is different from a \"sick visit.\" A sick visit is when you see a doctor because of a health concern or problem. Since physicals are scheduled ahead of time, you can think about what you want to ask the doctor.  How often should I get a physical? -- It depends on your age and health. In general, for people age 21 years and older:   If you are younger than 50 years, you might be able to get a physical every 3 years.   If you are 50 years or older, your doctor might recommend a physical every year.  If you have an ongoing health condition, like diabetes or high blood pressure, your doctor will probably want to see you more often.  What happens during a physical? -- In general, each visit will include:   Physical exam - The doctor or nurse will check your height, weight, heart rate, and blood pressure. They will also look at your eyes and ears. They will ask about how you are feeling and whether you have any symptoms that bother you.   Medicines - It's a good idea to bring a list of all the medicines you take to each doctor visit. Your doctor will talk to you about your medicines and answer any questions. Tell them if you are having any side effects that bother you. You " "should also tell them if you are having trouble paying for any of your medicines.   Habits and behaviors - This includes:   Your diet   Your exercise habits   Whether you smoke, drink alcohol, or use drugs   Whether you are sexually active   Whether you feel safe at home  Your doctor will talk to you about things you can do to improve your health and lower your risk of health problems. They will also offer help and support. For example, if you want to quit smoking, they can give you advice and might prescribe medicines. If you want to improve your diet or get more physical activity, they can help you with this, too.   Lab tests, if needed - The tests you get will depend on your age and situation. For example, your doctor might want to check your:   Cholesterol   Blood sugar   Iron level   Vaccines - The recommended vaccines will depend on your age, health, and what vaccines you already had. Vaccines are very important because they can prevent certain serious or deadly infections.   Discussion of screening - \"Screening\" means checking for diseases or other health problems before they cause symptoms. Your doctor can recommend screening based on your age, risk, and preferences. This might include tests to check for:   Cancer, such as breast, prostate, cervical, ovarian, colorectal, prostate, lung, or skin cancer   Sexually transmitted infections, such as chlamydia and gonorrhea   Mental health conditions like depression and anxiety  Your doctor will talk to you about the different types of screening tests. They can help you decide which screenings to have. They can also explain what the results might mean.   Answering questions - The physical is a good time to ask the doctor or nurse questions about your health. If needed, they can refer you to other doctors or specialists, too.  Adults older than 65 years often need other care, too. As you get older, your doctor will talk to you about:   How to prevent falling at " home   Hearing or vision tests   Memory testing   How to take your medicines safely   Making sure that you have the help and support you need at home  All topics are updated as new evidence becomes available and our peer review process is complete.  This topic retrieved from [employer] on: May 02, 2024.  Topic 087014 Version 1.0  Release: 32.4.3 - C32.122  © 2024 UpToDate, Inc. and/or its affiliates. All rights reserved.  Consumer Information Use and Disclaimer   Disclaimer: This generalized information is a limited summary of diagnosis, treatment, and/or medication information. It is not meant to be comprehensive and should be used as a tool to help the user understand and/or assess potential diagnostic and treatment options. It does NOT include all information about conditions, treatments, medications, side effects, or risks that may apply to a specific patient. It is not intended to be medical advice or a substitute for the medical advice, diagnosis, or treatment of a health care provider based on the health care provider's examination and assessment of a patient's specific and unique circumstances. Patients must speak with a health care provider for complete information about their health, medical questions, and treatment options, including any risks or benefits regarding use of medications. This information does not endorse any treatments or medications as safe, effective, or approved for treating a specific patient. UpToDate, Inc. and its affiliates disclaim any warranty or liability relating to this information or the use thereof.The use of this information is governed by the Terms of Use, available at https://www.woltersSmartKemuwer.com/en/know/clinical-effectiveness-terms. 2024© UpToDate, Inc. and its affiliates and/or licensors. All rights reserved.  Copyright   © 2024 UpToDate, Inc. and/or its affiliates. All rights reserved.

## 2024-08-28 ENCOUNTER — TELEPHONE (OUTPATIENT)
Age: 54
End: 2024-08-28

## 2024-08-28 NOTE — TELEPHONE ENCOUNTER
PA for Semaglutide-Weight Management (Wegovy) 1.7 MG/0.75ML SUBMITTED     via    []CMM-KEY:   [x]Nara-Case ID # [id number]   []Faxed to plan   []Other website   []Phone call Case ID #     Office notes sent, clinical questions answered. Awaiting determination    Turnaround time for your insurance to make a decision on your Prior Authorization can take 7-21 business days.

## 2024-09-03 NOTE — ASSESSMENT & PLAN NOTE
Patient will continue Wegovy 1.7 mg weekly.  She appears to be at her threshold currently of what she can tolerate with regards to dosage.  Patient will continue Metamucil daily for constipation.  She will continue eating a balanced diet and strive for regular exercise.  Patient was encouraged to reach out with any concerns prior to next visit in 3 months.

## 2024-09-03 NOTE — ASSESSMENT & PLAN NOTE
Doing well overall since sclerotherapy injections in March 2024.  She will continue wearing compression stockings.  Given prescription so that she can continue to update every 3 months as covered by her insurance.

## 2024-09-03 NOTE — ASSESSMENT & PLAN NOTE
Unable to use CPAP.  Reports needing an updated sleep study for dentist who might be able to make an oral mandibular advancement device for her.  States that he recommended a home study for 3 nights.  This duration is not an option to my knowledge.  Will place order for home study.

## 2024-09-06 ENCOUNTER — TRANSCRIBE ORDERS (OUTPATIENT)
Dept: SLEEP CENTER | Facility: CLINIC | Age: 54
End: 2024-09-06

## 2024-09-06 DIAGNOSIS — G47.33 OSA (OBSTRUCTIVE SLEEP APNEA): Primary | ICD-10-CM

## 2024-09-10 ENCOUNTER — PATIENT MESSAGE (OUTPATIENT)
Dept: FAMILY MEDICINE CLINIC | Facility: CLINIC | Age: 54
End: 2024-09-10

## 2024-09-10 DIAGNOSIS — E66.9 OBESITY WITH BODY MASS INDEX 30 OR GREATER: Primary | ICD-10-CM

## 2024-09-12 RX ORDER — SEMAGLUTIDE 1 MG/.5ML
INJECTION, SOLUTION SUBCUTANEOUS
Qty: 2 ML | Refills: 3 | Status: SHIPPED | OUTPATIENT
Start: 2024-09-12

## 2024-11-25 ENCOUNTER — APPOINTMENT (OUTPATIENT)
Dept: LAB | Facility: MEDICAL CENTER | Age: 54
End: 2024-11-25
Payer: COMMERCIAL

## 2024-11-25 ENCOUNTER — OFFICE VISIT (OUTPATIENT)
Dept: FAMILY MEDICINE CLINIC | Facility: CLINIC | Age: 54
End: 2024-11-25
Payer: COMMERCIAL

## 2024-11-25 VITALS
DIASTOLIC BLOOD PRESSURE: 74 MMHG | OXYGEN SATURATION: 99 % | BODY MASS INDEX: 34.42 KG/M2 | WEIGHT: 188.2 LBS | HEART RATE: 81 BPM | SYSTOLIC BLOOD PRESSURE: 128 MMHG

## 2024-11-25 DIAGNOSIS — I78.1 SPIDER VEIN, SYMPTOMATIC: ICD-10-CM

## 2024-11-25 DIAGNOSIS — R79.89 ABNORMAL TSH: ICD-10-CM

## 2024-11-25 DIAGNOSIS — G47.33 OBSTRUCTIVE SLEEP APNEA: ICD-10-CM

## 2024-11-25 DIAGNOSIS — E55.9 VITAMIN D DEFICIENCY: ICD-10-CM

## 2024-11-25 DIAGNOSIS — G25.81 RLS (RESTLESS LEGS SYNDROME): ICD-10-CM

## 2024-11-25 DIAGNOSIS — E66.9 OBESITY WITH BODY MASS INDEX 30 OR GREATER: Primary | ICD-10-CM

## 2024-11-25 DIAGNOSIS — E66.9 OBESITY WITH BODY MASS INDEX 30 OR GREATER: ICD-10-CM

## 2024-11-25 LAB
25(OH)D3 SERPL-MCNC: 29.9 NG/ML (ref 30–100)
ALBUMIN SERPL BCG-MCNC: 4.2 G/DL (ref 3.5–5)
ALP SERPL-CCNC: 83 U/L (ref 34–104)
ALT SERPL W P-5'-P-CCNC: 12 U/L (ref 7–52)
ANION GAP SERPL CALCULATED.3IONS-SCNC: 6 MMOL/L (ref 4–13)
AST SERPL W P-5'-P-CCNC: 15 U/L (ref 13–39)
BASOPHILS # BLD AUTO: 0.03 THOUSANDS/ΜL (ref 0–0.1)
BASOPHILS NFR BLD AUTO: 1 % (ref 0–1)
BILIRUB SERPL-MCNC: 0.46 MG/DL (ref 0.2–1)
BUN SERPL-MCNC: 13 MG/DL (ref 5–25)
CALCIUM SERPL-MCNC: 9.2 MG/DL (ref 8.4–10.2)
CHLORIDE SERPL-SCNC: 106 MMOL/L (ref 96–108)
CO2 SERPL-SCNC: 28 MMOL/L (ref 21–32)
CREAT SERPL-MCNC: 0.8 MG/DL (ref 0.6–1.3)
EOSINOPHIL # BLD AUTO: 0.06 THOUSAND/ΜL (ref 0–0.61)
EOSINOPHIL NFR BLD AUTO: 1 % (ref 0–6)
ERYTHROCYTE [DISTWIDTH] IN BLOOD BY AUTOMATED COUNT: 12.4 % (ref 11.6–15.1)
GFR SERPL CREATININE-BSD FRML MDRD: 83 ML/MIN/1.73SQ M
GLUCOSE SERPL-MCNC: 87 MG/DL (ref 65–140)
HCT VFR BLD AUTO: 39.1 % (ref 34.8–46.1)
HGB BLD-MCNC: 13.4 G/DL (ref 11.5–15.4)
IMM GRANULOCYTES # BLD AUTO: 0.01 THOUSAND/UL (ref 0–0.2)
IMM GRANULOCYTES NFR BLD AUTO: 0 % (ref 0–2)
LYMPHOCYTES # BLD AUTO: 1.64 THOUSANDS/ΜL (ref 0.6–4.47)
LYMPHOCYTES NFR BLD AUTO: 36 % (ref 14–44)
MCH RBC QN AUTO: 31.2 PG (ref 26.8–34.3)
MCHC RBC AUTO-ENTMCNC: 34.3 G/DL (ref 31.4–37.4)
MCV RBC AUTO: 91 FL (ref 82–98)
MONOCYTES # BLD AUTO: 0.36 THOUSAND/ΜL (ref 0.17–1.22)
MONOCYTES NFR BLD AUTO: 8 % (ref 4–12)
NEUTROPHILS # BLD AUTO: 2.47 THOUSANDS/ΜL (ref 1.85–7.62)
NEUTS SEG NFR BLD AUTO: 54 % (ref 43–75)
NRBC BLD AUTO-RTO: 0 /100 WBCS
PLATELET # BLD AUTO: 218 THOUSANDS/UL (ref 149–390)
PMV BLD AUTO: 10 FL (ref 8.9–12.7)
POTASSIUM SERPL-SCNC: 4.5 MMOL/L (ref 3.5–5.3)
PROT SERPL-MCNC: 6.8 G/DL (ref 6.4–8.4)
RBC # BLD AUTO: 4.29 MILLION/UL (ref 3.81–5.12)
SODIUM SERPL-SCNC: 140 MMOL/L (ref 135–147)
TSH SERPL DL<=0.05 MIU/L-ACNC: 2.99 UIU/ML (ref 0.45–4.5)
WBC # BLD AUTO: 4.57 THOUSAND/UL (ref 4.31–10.16)

## 2024-11-25 PROCEDURE — 82306 VITAMIN D 25 HYDROXY: CPT

## 2024-11-25 PROCEDURE — 99214 OFFICE O/P EST MOD 30 MIN: CPT | Performed by: PHYSICIAN ASSISTANT

## 2024-11-25 PROCEDURE — 85025 COMPLETE CBC W/AUTO DIFF WBC: CPT

## 2024-11-25 PROCEDURE — 36415 COLL VENOUS BLD VENIPUNCTURE: CPT

## 2024-11-25 PROCEDURE — 84443 ASSAY THYROID STIM HORMONE: CPT

## 2024-11-25 PROCEDURE — 80053 COMPREHEN METABOLIC PANEL: CPT

## 2024-11-25 RX ORDER — CHOLECALCIFEROL (VITAMIN D3) 50 MCG
2000 TABLET ORAL DAILY
Qty: 90 TABLET | Refills: 3 | Status: SHIPPED | OUTPATIENT
Start: 2024-11-25

## 2024-11-25 NOTE — PROGRESS NOTES
Name: Kylah Batista      : 1970      MRN: [id number]  Encounter Provider: Olimpia Quiroga PA-C  Encounter Date: 2024   Encounter department: [facility] PRIMARY CARE  :  Assessment & Plan  Obesity with body mass index 30 or greater  Prior Authorization Clinical Questions for Weight Management Pharmacotherapy    1. Does the patient have a contrainidcation to medication prescribed for weight management?: No  2. Does the patient have a diagnosis of obesity, confirmed by a BMI greater than or equal to 30 kg/m^2?: Yes  3. Does the patient have a BMI of greater than or equal to 27 kg/m^2 with at least one weight-related comorbidity/risk factor/complication (e.g. diabetes, dyslipidemia, coronary artery disease)?: Yes  5. Has the patient been on a weight loss regimen of low-calorie diet, increased physical activity, and lifestyle modifications for a minimum of 6 months?: Yes  7. Does the patient have a history of type 2 diabetes?: No  8. Has the member tried and failed other weight loss medication within the past 12 months?: No  9. Will the member use requested medication in combination with another GLP agonist or weight loss drug?: No  10. Is the medication a controlled substance?: No     Baseline weight (in pounds): 215 lbs  Current weight (in pounds): 188 lbs  Weight loss percentage: -12.56%       Continue Wegovy 1 mg daily (which is dose that she is currently able to tolerate and still obtain results with). Follow-up in 4 months. Call with any concerns in the interim. Continue balanced diet and regular exercise. Encouraged to return to the gym.        Vitamin D deficiency    Orders:    Cholecalciferol (Vitamin D) 50 MCG ( UT) tablet; Take 1 tablet (2,000 Units total) by mouth daily    Obstructive sleep apnea  Notes that she will be going soon for home sleep study equipment in prep for obtaining ILDA device.       RLS (restless legs syndrome)  Patient will continue with Requip 0.5 mg at  bedtime. She was encouraged to use consistently.       Spider vein, symptomatic  S/p sclerotherapy injections in 3/2024. Patient will continue with compression stockings. She will follow-up with Vascular next year.                 History of Present Illness     Obesity - on Wegovy 1 mg (retitrating due to nausea at lower doses) - reports she has been okay since back to the 1 mg dose - notes that she was vomiting and having to pull over and leave patient houses with the 1.7 mg dose - notes that she has slight nausea with the 1 mg dose but nothing severe - still has constipation sometimes more than others - not taking Metamucil daily but admits she probably should - notes that she has a decreased appetite - does not do well with breakfast but notes that lunch is usually yogurt and fruit - notes that she has less coffee than had previously - notes that she was on a cruise at the start of the month and notes that she skipped the medication x 2 weeks - gained about 10 pounds and has since lost almost all of that since back about 2.5 weeks ago - notes that she walks some with dog but wants to return to the gym    JIGAR - unable to use CPAP - look into ILDA device but needs a study - picking up home study equipment on 12/5/24    Spider veins - did sclero injections in 3/2024 - wearing compression stockings now     Vit D def -  on 2000 units of vit D daily - Last level was 17.9 in December 2022    RLS - on Requip 0.5 mg at bedtime but not consistently       Review of Systems   Endocrine:        Hot flashes      Medical History Reviewed by provider this encounter:     .     Objective   /74   Pulse 81   Wt 85.4 kg (188 lb 3.2 oz)   LMP 12/10/2022 (Exact Date)   SpO2 99%   BMI 34.42 kg/m²      Physical Exam  Vitals reviewed.   Constitutional:       General: She is not in acute distress.     Appearance: Normal appearance. She is well-developed. She is obese. She is not ill-appearing.   HENT:      Head: Normocephalic and  atraumatic.   Neck:      Thyroid: No thyromegaly.      Vascular: No carotid bruit.   Cardiovascular:      Rate and Rhythm: Normal rate and regular rhythm.      Pulses: Normal pulses.      Heart sounds: Normal heart sounds. No murmur heard.  Pulmonary:      Effort: Pulmonary effort is normal.      Breath sounds: Normal breath sounds. No wheezing, rhonchi or rales.   Musculoskeletal:      Cervical back: Neck supple.      Right lower leg: No edema.      Left lower leg: No edema.   Lymphadenopathy:      Cervical: No cervical adenopathy.   Skin:     General: Skin is warm and dry.   Neurological:      Mental Status: She is alert.   Psychiatric:         Mood and Affect: Mood normal.         Behavior: Behavior normal.         Thought Content: Thought content normal.         Judgment: Judgment normal.

## 2024-11-25 NOTE — ASSESSMENT & PLAN NOTE
S/p sclerotherapy injections in 3/2024. Patient will continue with compression stockings. She will follow-up with Vascular next year.

## 2024-11-25 NOTE — ASSESSMENT & PLAN NOTE
Notes that she will be going soon for home sleep study equipment in prep for obtaining ILDA device.

## 2024-11-25 NOTE — ASSESSMENT & PLAN NOTE
Prior Authorization Clinical Questions for Weight Management Pharmacotherapy    1. Does the patient have a contrainidcation to medication prescribed for weight management?: No  2. Does the patient have a diagnosis of obesity, confirmed by a BMI greater than or equal to 30 kg/m^2?: Yes  3. Does the patient have a BMI of greater than or equal to 27 kg/m^2 with at least one weight-related comorbidity/risk factor/complication (e.g. diabetes, dyslipidemia, coronary artery disease)?: Yes  5. Has the patient been on a weight loss regimen of low-calorie diet, increased physical activity, and lifestyle modifications for a minimum of 6 months?: Yes  7. Does the patient have a history of type 2 diabetes?: No  8. Has the member tried and failed other weight loss medication within the past 12 months?: No  9. Will the member use requested medication in combination with another GLP agonist or weight loss drug?: No  10. Is the medication a controlled substance?: No     Baseline weight (in pounds): 215 lbs  Current weight (in pounds): 188 lbs  Weight loss percentage: -12.56%       Continue Wegovy 1 mg daily (which is dose that she is currently able to tolerate and still obtain results with). Follow-up in 4 months. Call with any concerns in the interim. Continue balanced diet and regular exercise. Encouraged to return to the gym.

## 2024-11-25 NOTE — ASSESSMENT & PLAN NOTE
Orders:    Cholecalciferol (Vitamin D) 50 MCG (2000 UT) tablet; Take 1 tablet (2,000 Units total) by mouth daily

## 2024-11-26 ENCOUNTER — RESULTS FOLLOW-UP (OUTPATIENT)
Dept: FAMILY MEDICINE CLINIC | Facility: CLINIC | Age: 54
End: 2024-11-26

## 2024-12-05 ENCOUNTER — HOSPITAL ENCOUNTER (OUTPATIENT)
Dept: SLEEP CENTER | Facility: CLINIC | Age: 54
Discharge: HOME | End: 2024-12-05
Payer: COMMERCIAL

## 2024-12-05 DIAGNOSIS — G47.33 OSA (OBSTRUCTIVE SLEEP APNEA): ICD-10-CM

## 2024-12-05 PROCEDURE — G0399 HOME SLEEP TEST/TYPE 3 PORTA: HCPCS

## 2024-12-05 NOTE — PROGRESS NOTES
Home Sleep Study Documentation    HOME STUDY DEVICE: Noxturnal no                                           Chika G3 yes device # 16      Pre-Sleep Home Study:    Set-up and instructions performed by: Perri    Technician performed demonstration for Patient: yes    Return demonstration performed by Patient: yes    Written instructions provided to Patient: yes    Patient signed consent form: yes        Post-Sleep Home Study:    Additional comments by Patient: pending    Home Sleep Study Failed:pending    Failure reason: pending    Reported or Detected: pending    Scored by: pending

## 2024-12-10 ENCOUNTER — RESULTS FOLLOW-UP (OUTPATIENT)
Dept: FAMILY MEDICINE CLINIC | Facility: CLINIC | Age: 54
End: 2024-12-10

## 2024-12-10 PROCEDURE — 95806 SLEEP STUDY UNATT&RESP EFFT: CPT | Performed by: INTERNAL MEDICINE

## 2024-12-28 DIAGNOSIS — B37.2 CANDIDAL INTERTRIGO: ICD-10-CM

## 2024-12-30 ENCOUNTER — TELEPHONE (OUTPATIENT)
Dept: FAMILY MEDICINE CLINIC | Facility: CLINIC | Age: 54
End: 2024-12-30

## 2024-12-30 RX ORDER — NYSTATIN 100000 [USP'U]/G
POWDER TOPICAL 3 TIMES DAILY PRN
Qty: 60 G | Refills: 1 | Status: SHIPPED | OUTPATIENT
Start: 2024-12-30

## 2024-12-30 NOTE — TELEPHONE ENCOUNTER
Patient needs to remain on 1 mg strength for maintenance. She experiences severe side effects with the 1.7 mg strength including severe nausea and vomiting that impair her daily functioning. She is currently able to tolerate the Wegovy 1 mg strength.

## 2024-12-31 NOTE — TELEPHONE ENCOUNTER
PA for (Wegovy) 1 MG/0.5MLSUBMITTED to [employer]    via    [x]CMM-KEY: [id number]      [x]PA sent as URGENT    All office notes, labs and other pertaining documents and studies sent. Clinical questions answered. Awaiting determination from insurance company.     Turnaround time for your insurance to make a decision on your Prior Authorization can take 7-21 business days.

## 2025-01-24 ENCOUNTER — HOSPITAL ENCOUNTER (OUTPATIENT)
Dept: MAMMOGRAPHY | Facility: MEDICAL CENTER | Age: 55
Discharge: HOME | End: 2025-01-24
Payer: COMMERCIAL

## 2025-01-24 VITALS — WEIGHT: 188 LBS | HEIGHT: 62 IN | BODY MASS INDEX: 34.6 KG/M2

## 2025-01-24 DIAGNOSIS — Z12.31 ENCOUNTER FOR SCREENING MAMMOGRAM FOR BREAST CANCER: ICD-10-CM

## 2025-01-24 PROCEDURE — 77067 SCR MAMMO BI INCL CAD: CPT

## 2025-01-24 PROCEDURE — 77063 BREAST TOMOSYNTHESIS BI: CPT

## 2025-02-24 NOTE — TELEPHONE ENCOUNTER
----- Message from Paris Graves PA-C sent at 8/6/2018 11:32 AM EDT -----  Aida Barrett  Please have her take ergocalciferol 27004 units once weekly times 12 weeks and then transition to vitamin-D 2000 units daily over-the-counter  Thank you

## 2025-02-27 ENCOUNTER — NURSE TRIAGE (OUTPATIENT)
Age: 55
End: 2025-02-27

## 2025-02-27 NOTE — TELEPHONE ENCOUNTER
NP called stating she has a history of kidney stones and she is currently experiencing right flank pain, frequency, bladder pressure, sometimes has difficulty starting to void, incomplete bladder emptying, and voiding volumes varies from small to large. She rates her pain level currently 2-3/10 but was a 10/10 on Tuesday.  Her symptoms started on Tuesday. Denies fever, chills, or blood in urine. She has taken tylenol and ibuprofen which has provided relief. I reviewed ED precautions and encouraged water intake. I scheduled an appointment 2/28/2025 at 1 pm.     Reason for Disposition   The patient has mild /moderate pain, a history of kidney stones, and no recent imaging    Answer Assessment - Initial Assessment Questions  1. When did this pain start?   Tuesday    2. Where is your pain? Is your pain on the left, right, or both sides and does it radiate anywhere?  Right side    3. Are you able to rate your pain on a scale of 1-10 with 10 being the worst? Would you say it is mild, moderate, or severe?   2-3/10 currently, on Tuesday 10/10    4. Do you have other symptoms like nausea, vomiting or a fever of 101 or higher?   Had nausea    5. Do you have any urinary symptoms such as inability to urinate, urgency, frequency, pain or burning with urination?   Frequency, sometimes has difficulty starting to void    6. Do you have a history of kidney stones or UTI's?   History of kidney stones- several episodes over the course of a year    7. Have you had a recent urologic procedure or surgery? If yes, what procedure or surgery?   No    8. Have you been to ER, urgent care, PCP, or another specialist for this?   Was seen in the ED last year for kidney stones    9. Have you had recent imaging or urine testing within the last week?   No    Protocols used: Urology-Flank Pain / Kidney Stones / Hydronephrosis-ADULT-[place]

## 2025-02-28 ENCOUNTER — OFFICE VISIT (OUTPATIENT)
Dept: UROLOGY | Facility: CLINIC | Age: 55
End: 2025-02-28
Payer: COMMERCIAL

## 2025-02-28 VITALS
HEART RATE: 80 BPM | OXYGEN SATURATION: 98 % | HEIGHT: 62 IN | WEIGHT: 189 LBS | SYSTOLIC BLOOD PRESSURE: 112 MMHG | BODY MASS INDEX: 34.78 KG/M2 | DIASTOLIC BLOOD PRESSURE: 70 MMHG

## 2025-02-28 DIAGNOSIS — R10.9 ACUTE FLANK PAIN: Primary | ICD-10-CM

## 2025-02-28 DIAGNOSIS — N20.0 NEPHROLITHIASIS: ICD-10-CM

## 2025-02-28 LAB
SL AMB  POCT GLUCOSE, UA: NORMAL
SL AMB LEUKOCYTE ESTERASE,UA: NORMAL
SL AMB POCT BILIRUBIN,UA: NORMAL
SL AMB POCT BLOOD,UA: NORMAL
SL AMB POCT CLARITY,UA: CLEAR
SL AMB POCT COLOR,UA: YELLOW
SL AMB POCT KETONES,UA: NORMAL
SL AMB POCT NITRITE,UA: NORMAL
SL AMB POCT PH,UA: 7.5
SL AMB POCT SPECIFIC GRAVITY,UA: 1
SL AMB POCT URINE PROTEIN: NORMAL
SL AMB POCT UROBILINOGEN: 0.2

## 2025-02-28 PROCEDURE — 81002 URINALYSIS NONAUTO W/O SCOPE: CPT | Performed by: PHYSICIAN ASSISTANT

## 2025-02-28 PROCEDURE — 99214 OFFICE O/P EST MOD 30 MIN: CPT | Performed by: PHYSICIAN ASSISTANT

## 2025-02-28 NOTE — PROGRESS NOTES
Name: Kylah Batista      : 1970      MRN: [id number]  Encounter Provider: Rebecca Mcmahon PA-C  Encounter Date: 2025   Encounter department: [facility] [place] [facility]  :  Assessment & Plan  Nephrolithiasis    Orders:  •  POCT urine dip    Acute flank pain  Acute flank pain in the right side wraps around to the right lower quadrant pain was severe at onset she drove herself to the emergency room subsided before she signed an decided to go home.  Over the next 3 days she has had intermittent flank and groin pain bladder discomfort some urinary hesitancy frequency urgency.  She has not seen any stone material passed she has not had any dysuria and no hematuria.  No fevers or chills.  She had an episode of a left distal ureteral stone a year and a half ago that she passed spontaneously at the time ct scan showed a right lower pole stone also 3 mm.  I showed her the images from that scan today on the screen we looked that together.  She does feel like she had a few episodes of colic earlier in the year and questions if this is her right sided stone passing now.  Her pain is improved today but still has some bladder urgency.  Urinalysis today shows no leukocytes nitrates blood protein or sugar.  Suspicion for UTI is low.  History is consistent with right ureteral stone, hopefully with imminent stone passage.  She will strain her urine over the next week she is still symptomatic a CT scan has been requested to evaluate ureteral obstruction.  If she passes a stone she feels much better we can avoid the CT scan.  She has ibuprofen and Tylenol at home which has been helpful for managing her symptoms.  She will continue.  If she has severe pain vomiting fevers chills or feeling worse she will go to the emergency department for expedited care.  Orders:  •  CT renal stone study abdomen pelvis wo contrast; Future        History of Present Illness   Kylah Batista is a 54 y.o. female who presents acute/urgent  "visit for right flank pain urinary frequency bladder pressure urinary hesitancy over the past 3-4 days.  She has no fevers chills or hematuria.  She has been taking Tylenol ibuprofen which has helped her flank symptoms.  She does have history of prior stones and is concerned for acute episode.  Her last abdominal imaging was October 2023 a noncontrast CT stone study taken at the time of acute left UVJ stone with obstruction.  There was several 3 mm right renal calculi nonobstructing at that time.    Review of Systems   Constitutional: Negative.  Negative for fatigue and fever.   Respiratory: Negative.     Cardiovascular: Negative.    Genitourinary:  Positive for flank pain, frequency, pelvic pain and urgency. Negative for decreased urine volume, difficulty urinating, dysuria and hematuria.          Objective   /70 (BP Location: Left arm, Patient Position: Sitting, Cuff Size: Large)   Pulse 80   Ht 5' 2\" (1.575 m)   Wt 85.7 kg (189 lb)   LMP 12/10/2022 (Exact Date)   SpO2 98%   BMI 34.57 kg/m²     Physical Exam  Vitals and nursing note reviewed.   Constitutional:       Appearance: She is well-developed.   HENT:      Head: Normocephalic and atraumatic.   Pulmonary:      Effort: Pulmonary effort is normal.   Abdominal:      Tenderness: There is no right CVA tenderness or left CVA tenderness.   Skin:     General: Skin is warm.   Neurological:      Mental Status: She is alert and oriented to person, place, and time.           Results   No results found for: \"PSA\"  Lab Results   Component Value Date    CALCIUM 9.2 11/25/2024    K 4.5 11/25/2024    CO2 28 11/25/2024     11/25/2024    BUN 13 11/25/2024    CREATININE 0.80 11/25/2024     Lab Results   Component Value Date    WBC 4.57 11/25/2024    HGB 13.4 11/25/2024    HCT 39.1 11/25/2024    MCV 91 11/25/2024     11/25/2024       Office Urine Dip  No results found for this or any previous visit (from the past hour).        "

## 2025-03-31 ENCOUNTER — TELEPHONE (OUTPATIENT)
Dept: FAMILY MEDICINE CLINIC | Facility: CLINIC | Age: 55
End: 2025-03-31

## 2025-05-07 ENCOUNTER — TELEPHONE (OUTPATIENT)
Dept: SLEEP CENTER | Facility: CLINIC | Age: 55
End: 2025-05-07

## 2025-05-07 NOTE — TELEPHONE ENCOUNTER
Incoming call from patient who states she had sleep study done last year and went to dentist for Mandibular Advancement device and was found out it is not covered by dental insurance.     Speaking with medical insurance, asked for code for device -  given to patient.     Patient also asked about implantable device (Inspire).  Informed one criteria is to have tried and failed CPAP. Patient said she did try CPAP in the past and could not tolerate.      Offered to schedule patient with sleep specialist. Patient declined at this time, will call back if she decides to schedule.

## 2025-05-12 ENCOUNTER — APPOINTMENT (OUTPATIENT)
Dept: LAB | Facility: MEDICAL CENTER | Age: 55
End: 2025-05-12
Attending: PREVENTIVE MEDICINE
Payer: COMMERCIAL

## 2025-05-12 ENCOUNTER — ANNUAL EXAM (OUTPATIENT)
Dept: OBGYN CLINIC | Facility: MEDICAL CENTER | Age: 55
End: 2025-05-12
Payer: COMMERCIAL

## 2025-05-12 VITALS
HEIGHT: 62 IN | BODY MASS INDEX: 35.19 KG/M2 | WEIGHT: 191.2 LBS | DIASTOLIC BLOOD PRESSURE: 74 MMHG | SYSTOLIC BLOOD PRESSURE: 124 MMHG

## 2025-05-12 DIAGNOSIS — Z01.419 ENCOUNTER FOR GYNECOLOGICAL EXAMINATION: Primary | ICD-10-CM

## 2025-05-12 DIAGNOSIS — Z00.8 HEALTH EXAMINATION IN POPULATION SURVEY: ICD-10-CM

## 2025-05-12 LAB
CHOLEST SERPL-MCNC: 144 MG/DL (ref ?–200)
EST. AVERAGE GLUCOSE BLD GHB EST-MCNC: 100 MG/DL
HBA1C MFR BLD: 5.1 %
HDLC SERPL-MCNC: 52 MG/DL
LDLC SERPL CALC-MCNC: 73 MG/DL (ref 0–100)
NONHDLC SERPL-MCNC: 92 MG/DL
TRIGL SERPL-MCNC: 93 MG/DL (ref ?–150)

## 2025-05-12 PROCEDURE — 36415 COLL VENOUS BLD VENIPUNCTURE: CPT

## 2025-05-12 PROCEDURE — 80061 LIPID PANEL: CPT

## 2025-05-12 PROCEDURE — 83036 HEMOGLOBIN GLYCOSYLATED A1C: CPT

## 2025-05-12 PROCEDURE — S0612 ANNUAL GYNECOLOGICAL EXAMINA: HCPCS | Performed by: OBSTETRICS & GYNECOLOGY

## 2025-05-12 NOTE — PROGRESS NOTES
"Name: Kylah Batista      : 1970      MRN: [id number]  Encounter Provider: Amy Polanco MD  Encounter Date: 2025   Encounter department: [facility]  :  Assessment & Plan  Encounter for gynecological examination       Pap  smear  in   Colonoscopy  up to date  -  - cleared for  10 years   Mammo  up to date   Feels safe   Menopause discussed  - does not  desire  HRT       History of Present Illness   HPI  Kylah Batista is a 55 y.o. female who presents for routine  yearly visit   No Gyn complains    History obtained from: patient    Review of Systems   Constitutional: Negative.    HENT: Negative.     Respiratory: Negative.     Cardiovascular: Negative.    Gastrointestinal: Negative.    Genitourinary: Negative.    Musculoskeletal: Negative.    Neurological: Negative.    Hematological: Negative.    Psychiatric/Behavioral: Negative.          Objective   /74   Ht 5' 2\" (1.575 m)   Wt 86.7 kg (191 lb 3.2 oz)   LMP 12/10/2022 (Exact Date)   BMI 34.97 kg/m²      Physical Exam  Constitutional:       Appearance: Normal appearance.   HENT:      Head: Normocephalic and atraumatic.      Nose: Nose normal.   Eyes:      Conjunctiva/sclera: Conjunctivae normal.   Pulmonary:      Effort: Pulmonary effort is normal.   Chest:   Breasts:     Right: Normal. No swelling, bleeding, inverted nipple, mass, nipple discharge, skin change or tenderness.      Left: Normal. No swelling, bleeding, inverted nipple, mass, nipple discharge, skin change or tenderness.   Abdominal:      General: There is no distension.      Palpations: Abdomen is soft. There is no mass.      Tenderness: There is no abdominal tenderness. There is no guarding or rebound.      Hernia: No hernia is present.   Genitourinary:     General: Normal vulva.      Vagina: Normal.      Cervix: Normal.      Uterus: Normal.       Adnexa: Right adnexa normal and left adnexa normal.   Neurological:      General: No " focal deficit present.      Mental Status: She is alert and oriented to person, place, and time.   Psychiatric:         Mood and Affect: Mood normal.         Behavior: Behavior normal.

## 2025-05-16 DIAGNOSIS — E66.9 OBESITY WITH BODY MASS INDEX 30 OR GREATER: ICD-10-CM

## 2025-05-19 RX ORDER — SEMAGLUTIDE 1 MG/.5ML
INJECTION, SOLUTION SUBCUTANEOUS
Qty: 2 ML | Refills: 0 | Status: SHIPPED | OUTPATIENT
Start: 2025-05-19

## 2025-05-19 NOTE — TELEPHONE ENCOUNTER
Patient is due for a follow-up. She was last seen about 6 months ago. Please call her to schedule.

## 2025-05-19 NOTE — TELEPHONE ENCOUNTER
Pt is coming in on June 13th. She would like to know if she is able to get a refill until she comes in as she is due to take her shot tomorrow

## 2025-06-13 ENCOUNTER — APPOINTMENT (OUTPATIENT)
Dept: LAB | Facility: MEDICAL CENTER | Age: 55
End: 2025-06-13
Attending: PHYSICIAN ASSISTANT
Payer: COMMERCIAL

## 2025-06-13 ENCOUNTER — OFFICE VISIT (OUTPATIENT)
Dept: FAMILY MEDICINE CLINIC | Facility: CLINIC | Age: 55
End: 2025-06-13
Payer: COMMERCIAL

## 2025-06-13 VITALS
DIASTOLIC BLOOD PRESSURE: 78 MMHG | SYSTOLIC BLOOD PRESSURE: 112 MMHG | OXYGEN SATURATION: 98 % | WEIGHT: 191 LBS | HEART RATE: 80 BPM | BODY MASS INDEX: 34.93 KG/M2

## 2025-06-13 DIAGNOSIS — I83.893 SYMPTOMATIC VARICOSE VEINS OF BOTH LOWER EXTREMITIES: ICD-10-CM

## 2025-06-13 DIAGNOSIS — G47.33 OSA (OBSTRUCTIVE SLEEP APNEA): ICD-10-CM

## 2025-06-13 DIAGNOSIS — R10.11 RUQ PAIN: Primary | ICD-10-CM

## 2025-06-13 DIAGNOSIS — R79.89 ABNORMAL TSH: ICD-10-CM

## 2025-06-13 DIAGNOSIS — E55.9 VITAMIN D DEFICIENCY: ICD-10-CM

## 2025-06-13 DIAGNOSIS — E66.9 OBESITY WITH BODY MASS INDEX 30 OR GREATER: ICD-10-CM

## 2025-06-13 DIAGNOSIS — Z13.1 DIABETES MELLITUS SCREENING: ICD-10-CM

## 2025-06-13 DIAGNOSIS — G25.81 RLS (RESTLESS LEGS SYNDROME): ICD-10-CM

## 2025-06-13 LAB
25(OH)D3 SERPL-MCNC: 26.9 NG/ML (ref 30–100)
ALBUMIN SERPL BCG-MCNC: 4.3 G/DL (ref 3.5–5)
ALP SERPL-CCNC: 82 U/L (ref 34–104)
ALT SERPL W P-5'-P-CCNC: 12 U/L (ref 7–52)
ANION GAP SERPL CALCULATED.3IONS-SCNC: 5 MMOL/L (ref 4–13)
AST SERPL W P-5'-P-CCNC: 15 U/L (ref 13–39)
BASOPHILS # BLD AUTO: 0.01 THOUSANDS/ÂΜL (ref 0–0.1)
BASOPHILS NFR BLD AUTO: 0 % (ref 0–1)
BILIRUB SERPL-MCNC: 0.5 MG/DL (ref 0.2–1)
BUN SERPL-MCNC: 13 MG/DL (ref 5–25)
CALCIUM SERPL-MCNC: 9.3 MG/DL (ref 8.4–10.2)
CHLORIDE SERPL-SCNC: 104 MMOL/L (ref 96–108)
CO2 SERPL-SCNC: 31 MMOL/L (ref 21–32)
CREAT SERPL-MCNC: 0.77 MG/DL (ref 0.6–1.3)
EOSINOPHIL # BLD AUTO: 0.05 THOUSAND/ÂΜL (ref 0–0.61)
EOSINOPHIL NFR BLD AUTO: 1 % (ref 0–6)
ERYTHROCYTE [DISTWIDTH] IN BLOOD BY AUTOMATED COUNT: 12.8 % (ref 11.6–15.1)
GFR SERPL CREATININE-BSD FRML MDRD: 87 ML/MIN/1.73SQ M
GLUCOSE P FAST SERPL-MCNC: 89 MG/DL (ref 65–99)
HCT VFR BLD AUTO: 41 % (ref 34.8–46.1)
HGB BLD-MCNC: 13.7 G/DL (ref 11.5–15.4)
IMM GRANULOCYTES # BLD AUTO: 0.02 THOUSAND/UL (ref 0–0.2)
IMM GRANULOCYTES NFR BLD AUTO: 1 % (ref 0–2)
LYMPHOCYTES # BLD AUTO: 1.56 THOUSANDS/ÂΜL (ref 0.6–4.47)
LYMPHOCYTES NFR BLD AUTO: 35 % (ref 14–44)
MCH RBC QN AUTO: 30.8 PG (ref 26.8–34.3)
MCHC RBC AUTO-ENTMCNC: 33.4 G/DL (ref 31.4–37.4)
MCV RBC AUTO: 92 FL (ref 82–98)
MONOCYTES # BLD AUTO: 0.31 THOUSAND/ÂΜL (ref 0.17–1.22)
MONOCYTES NFR BLD AUTO: 7 % (ref 4–12)
NEUTROPHILS # BLD AUTO: 2.49 THOUSANDS/ÂΜL (ref 1.85–7.62)
NEUTS SEG NFR BLD AUTO: 56 % (ref 43–75)
NRBC BLD AUTO-RTO: 0 /100 WBCS
PLATELET # BLD AUTO: 201 THOUSANDS/UL (ref 149–390)
PMV BLD AUTO: 10.4 FL (ref 8.9–12.7)
POTASSIUM SERPL-SCNC: 4.2 MMOL/L (ref 3.5–5.3)
PROT SERPL-MCNC: 6.9 G/DL (ref 6.4–8.4)
RBC # BLD AUTO: 4.45 MILLION/UL (ref 3.81–5.12)
SODIUM SERPL-SCNC: 140 MMOL/L (ref 135–147)
TSH SERPL DL<=0.05 MIU/L-ACNC: 2.6 UIU/ML (ref 0.45–4.5)
WBC # BLD AUTO: 4.44 THOUSAND/UL (ref 4.31–10.16)

## 2025-06-13 PROCEDURE — 84443 ASSAY THYROID STIM HORMONE: CPT

## 2025-06-13 PROCEDURE — 82306 VITAMIN D 25 HYDROXY: CPT

## 2025-06-13 PROCEDURE — 99214 OFFICE O/P EST MOD 30 MIN: CPT | Performed by: PHYSICIAN ASSISTANT

## 2025-06-13 PROCEDURE — 80053 COMPREHEN METABOLIC PANEL: CPT

## 2025-06-13 PROCEDURE — 85025 COMPLETE CBC W/AUTO DIFF WBC: CPT

## 2025-06-13 PROCEDURE — 36415 COLL VENOUS BLD VENIPUNCTURE: CPT

## 2025-06-13 RX ORDER — SEMAGLUTIDE 1 MG/.5ML
INJECTION, SOLUTION SUBCUTANEOUS
Qty: 2 ML | Refills: 2 | Status: SHIPPED | OUTPATIENT
Start: 2025-06-13

## 2025-06-13 NOTE — ASSESSMENT & PLAN NOTE
Continue 2000 units daily. Recheck prior to next visit.  Orders:    Vitamin D 25 hydroxy; Future

## 2025-06-13 NOTE — ASSESSMENT & PLAN NOTE
Not on thyroid medication. Recheck with next labs.  Orders:    TSH, 3rd generation with Free T4 reflex; Future

## 2025-06-13 NOTE — ASSESSMENT & PLAN NOTE
- Advised to continue exploring the use of an oral mandibular advancement device.  - Provided code  to assist with insurance inquiries.

## 2025-06-13 NOTE — PROGRESS NOTES
Name: Kylah Batista      : 1970      MRN: [id number]  Encounter Provider: Olimpia Quiroga PA-C  Encounter Date: 2025   Encounter department: [facility]    :  Assessment & Plan  RUQ pain  - Symptoms suggest a potential gallbladder issue, possibly related to medication or dietary habits.  - Abdominal examination reveals tenderness in the upper right quadrant.  - An ultrasound will be ordered to evaluate for gallstones, wall thickening, or inflammation.  - Advised to avoid fatty foods to prevent symptom exacerbation. If ultrasound is normal, a HIDA scan may be considered to assess gallbladder function.  Orders:    US right upper quadrant; Future    Vitamin D deficiency  Continue 2000 units daily. Recheck prior to next visit.  Orders:    Vitamin D 25 hydroxy; Future    Abnormal TSH  Not on thyroid medication. Recheck with next labs.  Orders:    TSH, 3rd generation with Free T4 reflex; Future    JIGAR (obstructive sleep apnea)  - Advised to continue exploring the use of an oral mandibular advancement device.  - Provided code  to assist with insurance inquiries.       Symptomatic varicose veins of both lower extremities  S/p sclerotherapy. Continue compression stockings regularly.       RLS (restless legs syndrome)  Continue rare Requip when needed.       Obesity with body mass index 30 or greater  Prior Authorization Clinical Questions for Weight Management Pharmacotherapy    1. Does the patient have a contrainidcation to medication prescribed for weight management?: No  2. Does the patient have a diagnosis of obesity, confirmed by a BMI greater than or equal to 30 kg/m^2?: Yes  3. Does the patient have a BMI of greater than or equal to 27 kg/m^2 with at least one weight-related comorbidity/risk factor/complication (e.g. diabetes, dyslipidemia, coronary artery disease)?: Yes  9. Does the patient have a history of type 2 diabetes?: No  11. Will the member use requested medication  in combination with another GLP agonist or weight loss drug?: No  12. Is the medication a controlled substance?: No  For renewals: Has the patient had a positive outcome with current weight management medication (i.e., change in body weight of at least 4-5% after 12-16 weeks on maximally tolerated dose)?: Yes     Baseline weight (in pounds): 215 lbs  Current weight (in pounds): 191 lbs  Weight loss percentage: -11.16%       - Reports Wegovy is not helping with further weight loss but helps maintain current weight.  - Discussed potentially switching to Zepbound for more effective weight loss since she seems to be on a plateau with Wegovy and unable to tolerate a higher dose.  - Option to be reconsidered after resolving the gallbladder issue.  - Prescription refill for Wegovy 1 mg provided with two refills.  Orders:    Vitamin D 25 hydroxy; Future    CBC and differential; Future    Comprehensive metabolic panel; Future    TSH, 3rd generation with Free T4 reflex; Future    Diabetes mellitus screening    Orders:    CBC and differential; Future      Assessment & Plan  1. Suspected gallbladder issue.  - Symptoms suggest a potential gallbladder issue, possibly related to medication or dietary habits.  - Abdominal examination reveals tenderness in the upper right quadrant.  - An ultrasound will be ordered to evaluate for gallstones, wall thickening, or inflammation.  - Advised to avoid fatty foods to prevent symptom exacerbation. If ultrasound is normal, a HIDA scan may be considered to assess gallbladder function.    2. Weight management.  - Reports Wegovy is not helping with further weight loss but helps maintain current weight.  - Discussed potentially switching to Zepbound for more effective weight loss.  - Option to be reconsidered after resolving the gallbladder issue.  - Prescription refill for Wegovy 1 mg provided with two refills.    3. Sleep apnea.  - Advised to continue exploring the use of an oral mandibular  advancement device.  - Provided code  to assist with insurance inquiries.    4. Health maintenance.  - Vitamin D level was 29.9 in 11/2024, just below the optimal range of , improved from 17.9 two years ago.  - A1c and cholesterol levels were satisfactory last month.  - Advised to maintain consistency in vitamin D supplementation.  - Lab order for CBC, CMP, TSH, and vitamin D placed.    Follow-up  The patient will follow up in August for a physical examination.           History of Present Illness     History of Present Illness  The patient is a 55-year-old female who presents for follow-up on chronic conditions.    She reports an incident of consuming a full hamburger, which is unusual for her. The following day, she experienced upper right quadrant pain and vomited three times. She also mentions intermittent twinges and tenderness in the same area, leading her to suspect a gallbladder issue. The pain was not severe enough to warrant an emergency room visit. She expresses anxiety about potential gallbladder or kidney stone attacks. She is curious if these symptoms could be a side effect of her medication or dietary changes. She also inquires about possible methods to eliminate gallstones.    She continues to experience intermittent nausea, particularly within the first 24 hours post-medication. She reports no significant weight loss and expresses a desire to lose more weight, but is hesitant to increase her medication dosage. She acknowledges the need for increased physical activity. She believes that Wegovy helps control her food intake. She is currently on a 1 mg dose of Wegovy, which she tolerates well. She initially started with Saxenda but switched to Wegovy due to availability issues.    She has been exploring the use of an oral mandibular advancement device for her sleep apnea. She has contacted her orthodontist and dentist, and is considering paying out-of-pocket for the device. She recalls  contacting the sleep center to inquire about mandibular devices but does not remember their response. She plans to contact her medical insurance provider regarding the device.    Her leg condition has improved. She occasionally takes 2000 units of vitamin D. She has not been taking Requip as her legs have been in good condition. She reports no recent episodes of restless legs or leg discomfort. She speculates that wearing stronger compression stockings during the day may be beneficial. She has run out of [employer] and needs to purchase more. She prefers to take her vitamins in the evening before bed as they sometimes cause nausea.     Review of Systems   Gastrointestinal:  Positive for abdominal pain and nausea (intermittently - usually within 1 day of GLP-1 injection).     Objective   /78   Pulse 80   Wt 86.6 kg (191 lb)   LMP 12/10/2022 (Exact Date)   SpO2 98%   BMI 34.93 kg/m²     Physical Exam  Respiratory: Clear to auscultation, no wheezing, rales or rhonchi  Gastrointestinal: Mild tenderness in the right upper quadrant, no liver enlargement on palpation, no significant discomfort on deep palpation  Physical Exam  Vitals reviewed.   Constitutional:       General: She is not in acute distress.     Appearance: Normal appearance. She is well-developed. She is obese. She is not ill-appearing.   HENT:      Head: Normocephalic and atraumatic.   Neck:      Thyroid: No thyromegaly.      Vascular: No carotid bruit.     Cardiovascular:      Rate and Rhythm: Normal rate and regular rhythm.      Pulses: Normal pulses.      Heart sounds: Normal heart sounds. No murmur heard.  Pulmonary:      Effort: Pulmonary effort is normal.      Breath sounds: Normal breath sounds. No wheezing, rhonchi or rales.     Musculoskeletal:      Cervical back: Neck supple.      Right lower leg: No edema.      Left lower leg: No edema.   Lymphadenopathy:      Cervical: No cervical adenopathy.     Skin:     General: Skin is warm and  dry.     Neurological:      Mental Status: She is alert.     Psychiatric:         Mood and Affect: Mood normal.         Behavior: Behavior normal.         Thought Content: Thought content normal.         Judgment: Judgment normal.

## 2025-06-13 NOTE — ASSESSMENT & PLAN NOTE
Prior Authorization Clinical Questions for Weight Management Pharmacotherapy    1. Does the patient have a contrainidcation to medication prescribed for weight management?: No  2. Does the patient have a diagnosis of obesity, confirmed by a BMI greater than or equal to 30 kg/m^2?: Yes  3. Does the patient have a BMI of greater than or equal to 27 kg/m^2 with at least one weight-related comorbidity/risk factor/complication (e.g. diabetes, dyslipidemia, coronary artery disease)?: Yes  9. Does the patient have a history of type 2 diabetes?: No  11. Will the member use requested medication in combination with another GLP agonist or weight loss drug?: No  12. Is the medication a controlled substance?: No  For renewals: Has the patient had a positive outcome with current weight management medication (i.e., change in body weight of at least 4-5% after 12-16 weeks on maximally tolerated dose)?: Yes     Baseline weight (in pounds): 215 lbs  Current weight (in pounds): 191 lbs  Weight loss percentage: -11.16%       - Reports Wegovy is not helping with further weight loss but helps maintain current weight.  - Discussed potentially switching to Zepbound for more effective weight loss since she seems to be on a plateau with Wegovy and unable to tolerate a higher dose.  - Option to be reconsidered after resolving the gallbladder issue.  - Prescription refill for Wegovy 1 mg provided with two refills.  Orders:    Vitamin D 25 hydroxy; Future    CBC and differential; Future    Comprehensive metabolic panel; Future    TSH, 3rd generation with Free T4 reflex; Future

## 2025-06-14 ENCOUNTER — RESULTS FOLLOW-UP (OUTPATIENT)
Dept: FAMILY MEDICINE CLINIC | Facility: CLINIC | Age: 55
End: 2025-06-14

## 2025-07-01 ENCOUNTER — HOSPITAL ENCOUNTER (OUTPATIENT)
Dept: ULTRASOUND IMAGING | Facility: MEDICAL CENTER | Age: 55
Discharge: HOME | End: 2025-07-01
Attending: PHYSICIAN ASSISTANT
Payer: COMMERCIAL

## 2025-07-01 DIAGNOSIS — R10.11 RUQ PAIN: ICD-10-CM

## 2025-07-01 PROCEDURE — 76705 ECHO EXAM OF ABDOMEN: CPT
